# Patient Record
Sex: FEMALE | Race: WHITE | Employment: OTHER | URBAN - METROPOLITAN AREA
[De-identification: names, ages, dates, MRNs, and addresses within clinical notes are randomized per-mention and may not be internally consistent; named-entity substitution may affect disease eponyms.]

---

## 2017-01-18 RX ORDER — ZOLPIDEM TARTRATE 5 MG/1
TABLET ORAL
Qty: 30 TABLET | Refills: 0 | OUTPATIENT
Start: 2017-01-18 | End: 2017-12-11

## 2017-01-18 NOTE — TELEPHONE ENCOUNTER
Please advise on RF  · Appointment scheduled in the past 6 months or in the next 3 months  Recent Visits       Provider Department Primary Dx    4 months ago Marla Ruano, [address] [facility], [place] [address], [place] Routine general medical examination at a

## 2017-02-08 ENCOUNTER — OFFICE VISIT (OUTPATIENT)
Dept: OBGYN CLINIC | Facility: CLINIC | Age: 47
End: 2017-02-08

## 2017-02-08 VITALS — SYSTOLIC BLOOD PRESSURE: 122 MMHG | BODY MASS INDEX: 33 KG/M2 | WEIGHT: 193 LBS | DIASTOLIC BLOOD PRESSURE: 82 MMHG

## 2017-02-08 DIAGNOSIS — N89.8 VAGINAL DISCHARGE: ICD-10-CM

## 2017-02-08 DIAGNOSIS — Z78.9: Primary | ICD-10-CM

## 2017-02-08 PROCEDURE — 99212 OFFICE O/P EST SF 10 MIN: CPT | Performed by: OBSTETRICS & GYNECOLOGY

## 2017-02-08 NOTE — PROGRESS NOTES
HPI:    Patient ID: Yolette Centeno is a 55year old female. HPI  Patient presents with a complaint of experiencing excessive vaginal wetness and secretion only during sexual intercourse.   It begins as she is getting aroused during sexual activity an membranes  (primary encounter diagnosis)  Vaginal discharge  Excessive vaginal wetness or lubrication during sexual arousal  Check serum estradiol  Reassured that this is not pathology.     Orders Placed This Encounter  Estradiol    Meds This Visit:  No pre

## 2017-02-24 ENCOUNTER — APPOINTMENT (OUTPATIENT)
Dept: LAB | Age: 47
End: 2017-02-24
Attending: OBSTETRICS & GYNECOLOGY
Payer: COMMERCIAL

## 2017-02-24 DIAGNOSIS — Z78.9: ICD-10-CM

## 2017-02-24 LAB — ESTRADIOL SERPL-MCNC: 254 PG/ML

## 2017-02-24 PROCEDURE — 82670 ASSAY OF TOTAL ESTRADIOL: CPT

## 2017-02-24 PROCEDURE — 36415 COLL VENOUS BLD VENIPUNCTURE: CPT

## 2017-03-14 RX ORDER — LEVOTHYROXINE SODIUM 0.1 MG/1
TABLET ORAL
Qty: 90 TABLET | Refills: 0 | Status: SHIPPED | OUTPATIENT
Start: 2017-03-14 | End: 2017-05-11

## 2017-03-14 NOTE — TELEPHONE ENCOUNTER
Refilled per protocol  Hypothyroid Medications  Protocol Criteria:  Appointment scheduled in the past 12 months or the next 3 months  TSH resulted in the past 12 months that is normal  Recent Visits       Provider Department Primary Dx    6 months ago [address]

## 2017-05-13 RX ORDER — LEVOTHYROXINE SODIUM 0.1 MG/1
TABLET ORAL
Qty: 90 TABLET | Refills: 0 | Status: SHIPPED | OUTPATIENT
Start: 2017-05-13 | End: 2017-06-14

## 2017-05-13 RX ORDER — ERGOCALCIFEROL 1.25 MG/1
CAPSULE ORAL
Qty: 4 CAPSULE | Refills: 0 | Status: SHIPPED | OUTPATIENT
Start: 2017-05-13 | End: 2017-07-13

## 2017-05-13 NOTE — TELEPHONE ENCOUNTER
Hypothyroid Medications: Refilled per protocol    Protocol Criteria:  Appointment scheduled in the past 12 months or the next 3 months  TSH resulted in the past 12 months that is normal  Recent Visits       Provider Department Primary Dx    8 months ago Si

## 2017-06-14 ENCOUNTER — APPOINTMENT (OUTPATIENT)
Dept: LAB | Age: 47
End: 2017-06-14
Attending: FAMILY MEDICINE
Payer: COMMERCIAL

## 2017-06-14 ENCOUNTER — OFFICE VISIT (OUTPATIENT)
Dept: FAMILY MEDICINE CLINIC | Facility: CLINIC | Age: 47
End: 2017-06-14

## 2017-06-14 VITALS
DIASTOLIC BLOOD PRESSURE: 85 MMHG | HEART RATE: 73 BPM | HEIGHT: 64 IN | SYSTOLIC BLOOD PRESSURE: 121 MMHG | TEMPERATURE: 100 F | RESPIRATION RATE: 14 BRPM | BODY MASS INDEX: 31.1 KG/M2 | WEIGHT: 182.19 LBS

## 2017-06-14 DIAGNOSIS — E03.9 ACQUIRED HYPOTHYROIDISM: ICD-10-CM

## 2017-06-14 DIAGNOSIS — R06.83 SNORING: ICD-10-CM

## 2017-06-14 DIAGNOSIS — E55.9 VITAMIN D DEFICIENCY: ICD-10-CM

## 2017-06-14 DIAGNOSIS — L98.9 DISORDER OF SKIN OR SUBCUTANEOUS TISSUE: ICD-10-CM

## 2017-06-14 DIAGNOSIS — L85.9 HYPERKERATOSIS: ICD-10-CM

## 2017-06-14 DIAGNOSIS — L84 CALLUS OF FOOT: ICD-10-CM

## 2017-06-14 DIAGNOSIS — M79.672 LEFT FOOT PAIN: Primary | ICD-10-CM

## 2017-06-14 DIAGNOSIS — G47.8 UNREFRESHED BY SLEEP: ICD-10-CM

## 2017-06-14 DIAGNOSIS — R06.89 GASPING FOR BREATH: ICD-10-CM

## 2017-06-14 PROCEDURE — 99214 OFFICE O/P EST MOD 30 MIN: CPT | Performed by: FAMILY MEDICINE

## 2017-06-14 PROCEDURE — 11055 PARING/CUTG B9 HYPRKER LES 1: CPT | Performed by: FAMILY MEDICINE

## 2017-06-14 PROCEDURE — 82306 VITAMIN D 25 HYDROXY: CPT

## 2017-06-14 PROCEDURE — 36415 COLL VENOUS BLD VENIPUNCTURE: CPT

## 2017-06-14 PROCEDURE — 84443 ASSAY THYROID STIM HORMONE: CPT

## 2017-06-14 RX ORDER — LEVOTHYROXINE SODIUM 0.1 MG/1
100 TABLET ORAL DAILY
Qty: 3 TABLET | Refills: 0 | Status: SHIPPED | OUTPATIENT
Start: 2017-06-14 | End: 2017-06-17

## 2017-06-14 RX ORDER — LEVOTHYROXINE SODIUM 0.1 MG/1
TABLET ORAL
Qty: 90 TABLET | Refills: 2 | Status: SHIPPED | OUTPATIENT
Start: 2017-06-14 | End: 2017-12-11

## 2017-06-14 RX ORDER — UREA 40 %
CREAM (GRAM) TOPICAL
Qty: 85 G | Refills: 1 | Status: SHIPPED | OUTPATIENT
Start: 2017-06-14 | End: 2017-07-13

## 2017-06-14 NOTE — PROGRESS NOTES
Patient ID: Lidia Nielson is a 55year old female. HPI  Patient presents with:  Pain: bottom of left foot     She is here for a few issues.   The one that she had made the appointment for was the pain at the bottom of left foot underneath the fifth 30 tablet Rfl: 0   Tretinoin 0.05 % External Cream Apply to the entire face as directed at bedtime. Disp: 20 g Rfl: 3   Multiple Vitamin (MULTI-VITAMINS) Oral Tab Take  by mouth.  take 1 tablet by oral route  every day with food Disp:  Rfl:      Allergies:N deficiency and has been taking vitamin D and would like to get this checked along with her TSH  Snoring  -     HOME SLEEP APNEA TEST (VKS)  Home sleep apnea test ordered. Philo sleep score done.   Unrefreshed by sleep  -     HOME SLEEP APNEA TEST (VKS)

## 2017-06-20 ENCOUNTER — TELEPHONE (OUTPATIENT)
Dept: DERMATOLOGY CLINIC | Facility: CLINIC | Age: 47
End: 2017-06-20

## 2017-06-22 ENCOUNTER — OFFICE VISIT (OUTPATIENT)
Dept: SLEEP CENTER | Age: 47
End: 2017-06-22
Attending: FAMILY MEDICINE
Payer: COMMERCIAL

## 2017-06-22 DIAGNOSIS — R06.89 GASPING FOR BREATH: ICD-10-CM

## 2017-06-22 DIAGNOSIS — R06.83 SNORING: Primary | ICD-10-CM

## 2017-06-22 DIAGNOSIS — G47.8 UNREFRESHED BY SLEEP: ICD-10-CM

## 2017-06-22 PROCEDURE — 95806 SLEEP STUDY UNATT&RESP EFFT: CPT

## 2017-06-24 NOTE — PROCEDURES
[address] [facility]  Accredited by the [employer] [employer] (AASM)    PATIENT'S NAME: Korey Delarosa   ATTENDING PHYSICIAN: Kemar Damon DO   REFERRING PHYSICIAN: Kemar Damon DO   PATIENT ACCOUNT #: [de-identified] LOCATION: Sleep Avoid alcohol. 4.   Avoid sedating drug. 5.   The patient should not drive if at all sleepy. Please not hesitate to contact me if there is any question whatsoever regarding interpretation of this study.     Dictated By Nichole Blanco MD  d: 06/24/2

## 2017-06-27 ENCOUNTER — TELEPHONE (OUTPATIENT)
Dept: FAMILY MEDICINE CLINIC | Facility: CLINIC | Age: 47
End: 2017-06-27

## 2017-06-27 DIAGNOSIS — G47.33 OSA (OBSTRUCTIVE SLEEP APNEA): Primary | ICD-10-CM

## 2017-06-27 NOTE — TELEPHONE ENCOUNTER
Per Maxime Agudelo need a new Order for pt,  Need to put in Cpap Titration not a diagnostic sleep study. Pls put it in [facility] asap so that they can call pt for an appt.

## 2017-06-29 NOTE — TELEPHONE ENCOUNTER
Referral Notes   Number of Notes: 1   Type Date User Summary Attachment   Provider Comments 06/25/2017  6:02 PM Marcia Henry DO Provider Comments -   Note    Please call the [facility] at [phone] and ask for a CPAP titration.       Lance

## 2017-07-06 ENCOUNTER — TELEPHONE (OUTPATIENT)
Dept: FAMILY MEDICINE CLINIC | Facility: CLINIC | Age: 47
End: 2017-07-06

## 2017-07-06 DIAGNOSIS — G47.33 OSA (OBSTRUCTIVE SLEEP APNEA): Primary | ICD-10-CM

## 2017-07-06 NOTE — TELEPHONE ENCOUNTER
Annalee Green from [address] sleep study needs a new order and she needs to order to state cpap titration only so that insurance can be billed correctly . Mayur Belle please advise

## 2017-07-13 ENCOUNTER — LAB ENCOUNTER (OUTPATIENT)
Dept: LAB | Age: 47
End: 2017-07-13
Attending: FAMILY MEDICINE
Payer: COMMERCIAL

## 2017-07-13 ENCOUNTER — OFFICE VISIT (OUTPATIENT)
Dept: FAMILY MEDICINE CLINIC | Facility: CLINIC | Age: 47
End: 2017-07-13

## 2017-07-13 VITALS
HEIGHT: 64 IN | SYSTOLIC BLOOD PRESSURE: 127 MMHG | BODY MASS INDEX: 31.1 KG/M2 | DIASTOLIC BLOOD PRESSURE: 84 MMHG | HEART RATE: 98 BPM | TEMPERATURE: 99 F | RESPIRATION RATE: 16 BRPM | WEIGHT: 182.19 LBS

## 2017-07-13 DIAGNOSIS — Z11.3 SCREEN FOR STD (SEXUALLY TRANSMITTED DISEASE): ICD-10-CM

## 2017-07-13 DIAGNOSIS — R32 URINARY INCONTINENCE, UNSPECIFIED TYPE: ICD-10-CM

## 2017-07-13 DIAGNOSIS — M25.561 CHRONIC PAIN OF RIGHT KNEE: Primary | ICD-10-CM

## 2017-07-13 DIAGNOSIS — R39.11 URINARY HESITANCY: ICD-10-CM

## 2017-07-13 DIAGNOSIS — G89.29 CHRONIC PAIN OF RIGHT KNEE: Primary | ICD-10-CM

## 2017-07-13 PROCEDURE — 99214 OFFICE O/P EST MOD 30 MIN: CPT | Performed by: FAMILY MEDICINE

## 2017-07-13 PROCEDURE — 86704 HEP B CORE ANTIBODY TOTAL: CPT

## 2017-07-13 PROCEDURE — 99212 OFFICE O/P EST SF 10 MIN: CPT | Performed by: FAMILY MEDICINE

## 2017-07-13 PROCEDURE — 36415 COLL VENOUS BLD VENIPUNCTURE: CPT

## 2017-07-13 PROCEDURE — 87340 HEPATITIS B SURFACE AG IA: CPT

## 2017-07-13 PROCEDURE — 86696 HERPES SIMPLEX TYPE 2 TEST: CPT

## 2017-07-13 PROCEDURE — 87591 N.GONORRHOEAE DNA AMP PROB: CPT

## 2017-07-13 PROCEDURE — 86695 HERPES SIMPLEX TYPE 1 TEST: CPT

## 2017-07-13 PROCEDURE — 86694 HERPES SIMPLEX NES ANTBDY: CPT

## 2017-07-13 PROCEDURE — 87491 CHLMYD TRACH DNA AMP PROBE: CPT

## 2017-07-13 PROCEDURE — 87389 HIV-1 AG W/HIV-1&-2 AB AG IA: CPT

## 2017-07-13 PROCEDURE — 86706 HEP B SURFACE ANTIBODY: CPT

## 2017-07-13 PROCEDURE — 86780 TREPONEMA PALLIDUM: CPT

## 2017-07-13 PROCEDURE — 80500 HEPATITIS B PROFILE: CPT

## 2017-07-13 RX ORDER — ERGOCALCIFEROL 1.25 MG/1
CAPSULE ORAL
Qty: 4 CAPSULE | Refills: 0 | Status: SHIPPED | OUTPATIENT
Start: 2017-07-13 | End: 2018-03-04

## 2017-07-13 NOTE — PROGRESS NOTES
Patient ID: Markus Paredes is a 52year old female. HPI  Patient presents with:  Pain: right knee pain  Test Results: allergy test   She states she has had right knee pain for years.   No trauma but she states lately when she sits with her knee cros Tab TAKE ONE TABLET BY MOUTH ONCE DAILY Disp: 90 tablet Rfl: 2   ERGOCALCIFEROL 26917 units Oral Cap TAKE ONE CAPSULE BY MOUTH ONCE A WEEK Disp: 4 capsule Rfl: 0   ZOLPIDEM TARTRATE 5 MG Oral Tab TAKE ONE TABLET BY MOUTH AT BEDTIME FOR SLEEP Disp: 30 table your knee crossed as I will put more pressure in the medial joint line.   She states she is already been doing that and it has helped decrease the pain  Urinary hesitancy  -     PHYSICAL THERAPY - INTERNAL  Kegel exercises through physical therapy  Urinary

## 2017-07-14 LAB
C TRACH DNA SPEC QL NAA+PROBE: NEGATIVE
HBV CORE AB SERPL QL IA: NONREACTIVE
HBV SURFACE AB SER-ACNC: <3.1 MIU/ML (ref ?–10)
HBV SURFACE AG SERPL QL IA: NONREACTIVE
HBV SURFACE AG SERPL QL IA: NONREACTIVE
HIV1+2 AB SERPL QL IA: NONREACTIVE
HSV 1 GLYCOPROTEIN G AB, IGG: 33.1 IV
HSV 2 GLYCOPROTEIN G AB, IGG: 0.1 IV
N GONORRHOEA DNA SPEC QL NAA+PROBE: NEGATIVE
T PALLIDUM AB SER QL: NEGATIVE

## 2017-07-15 LAB
HSV TYPE 1/2 COMBINED AB, IGG: >22.4 IV
HSV TYPE 1/2 COMBINED ABS, IGM: 0.54 IV

## 2017-09-15 RX ORDER — LEVOTHYROXINE SODIUM 0.1 MG/1
TABLET ORAL
Qty: 90 TABLET | Refills: 0 | OUTPATIENT
Start: 2017-09-15

## 2017-12-08 ENCOUNTER — NURSE TRIAGE (OUTPATIENT)
Dept: FAMILY MEDICINE CLINIC | Facility: CLINIC | Age: 47
End: 2017-12-08

## 2017-12-08 NOTE — TELEPHONE ENCOUNTER
Action Requested: Summary for Provider     []  Critical Lab, Recommendations Needed  [x] Need Additional Advice  []   FYI    []   Need Orders  [] Need Medications Sent to Pharmacy  []  Other     SUMMARY: ADVICE NEEDED OV vs ER, intermittent dizziness spell

## 2017-12-08 NOTE — TELEPHONE ENCOUNTER
Pt informed of advise below from VS. Pt agrees and will try breathing technique. States currently napping. Agrees to go to ER if not improving or if worsening. Denies further questions/concerns at this time.

## 2017-12-08 NOTE — TELEPHONE ENCOUNTER
I think this sounds more stress related. If not really having any chest pain or trouble breathing I think he should be fine. He need to slow down her breathing and breathing through her nose and out the her mouth and try to do this 10 times.   If you star

## 2017-12-11 ENCOUNTER — OFFICE VISIT (OUTPATIENT)
Dept: FAMILY MEDICINE CLINIC | Facility: CLINIC | Age: 47
End: 2017-12-11

## 2017-12-11 VITALS
WEIGHT: 182 LBS | TEMPERATURE: 97 F | DIASTOLIC BLOOD PRESSURE: 84 MMHG | BODY MASS INDEX: 31.07 KG/M2 | HEIGHT: 64 IN | SYSTOLIC BLOOD PRESSURE: 120 MMHG | HEART RATE: 80 BPM

## 2017-12-11 DIAGNOSIS — Z23 NEED FOR VACCINATION: ICD-10-CM

## 2017-12-11 DIAGNOSIS — E03.9 ACQUIRED HYPOTHYROIDISM: ICD-10-CM

## 2017-12-11 DIAGNOSIS — H69.83 DYSFUNCTION OF BOTH EUSTACHIAN TUBES: ICD-10-CM

## 2017-12-11 DIAGNOSIS — G47.00 INSOMNIA, UNSPECIFIED TYPE: ICD-10-CM

## 2017-12-11 DIAGNOSIS — R42 DIZZINESS: Primary | ICD-10-CM

## 2017-12-11 DIAGNOSIS — G47.33 OBSTRUCTIVE SLEEP APNEA SYNDROME: ICD-10-CM

## 2017-12-11 PROCEDURE — 90686 IIV4 VACC NO PRSV 0.5 ML IM: CPT | Performed by: FAMILY MEDICINE

## 2017-12-11 PROCEDURE — 99212 OFFICE O/P EST SF 10 MIN: CPT | Performed by: FAMILY MEDICINE

## 2017-12-11 PROCEDURE — 99214 OFFICE O/P EST MOD 30 MIN: CPT | Performed by: FAMILY MEDICINE

## 2017-12-11 PROCEDURE — 90471 IMMUNIZATION ADMIN: CPT | Performed by: FAMILY MEDICINE

## 2017-12-11 RX ORDER — ZOLPIDEM TARTRATE 5 MG/1
TABLET ORAL
Qty: 30 TABLET | Refills: 0 | Status: SHIPPED | OUTPATIENT
Start: 2017-12-11 | End: 2018-07-31

## 2017-12-11 RX ORDER — LEVOTHYROXINE SODIUM 0.1 MG/1
TABLET ORAL
Qty: 90 TABLET | Refills: 0 | Status: SHIPPED | OUTPATIENT
Start: 2017-12-11 | End: 2018-03-04

## 2017-12-11 NOTE — PROGRESS NOTES
Patient ID: Elizabeth Jensen is a 52year old female.     HPI  Patient presents with:  Dizziness  Medication Request    Action Requested: Summary for Provider     []  Critical Lab, Recommendations Needed  [x] Need Additional Advice  []   FYI    []   Need sleep as well. She states she needs some zolpidem for times when she is a bit stressed and unable to sleep. For this year to get all her labs done.   She also states she needs her thyroid test done and then she will make an appointment for a full physical Rfl:      Allergies:No Known Allergies   PHYSICAL EXAM:   Physical Exam  Blood pressure 126/93, pulse 80, temperature (!) 97.4 °F (36.3 °C), temperature source Oral, height 5' 4\" (1.626 m), weight 182 lb (82.6 kg), not currently breastfeeding.     Physical TABLET BY MOUTH ONCE DAILY  She will see me for a physical in the near future  Insomnia, unspecified type  -     Zolpidem Tartrate 5 MG Oral Tab; TAKE ONE TABLET BY MOUTH AT BEDTIME FOR SLEEP  I wrote for some zolpidem that she only takes as needed  Need f

## 2017-12-15 ENCOUNTER — OFFICE VISIT (OUTPATIENT)
Dept: FAMILY MEDICINE CLINIC | Facility: CLINIC | Age: 47
End: 2017-12-15

## 2017-12-15 VITALS
SYSTOLIC BLOOD PRESSURE: 127 MMHG | WEIGHT: 182 LBS | DIASTOLIC BLOOD PRESSURE: 91 MMHG | TEMPERATURE: 98 F | BODY MASS INDEX: 31.07 KG/M2 | HEART RATE: 72 BPM | HEIGHT: 64 IN

## 2017-12-15 DIAGNOSIS — R79.9 ELEVATED RESULT IN MULTI-BIOMARKER DISEASE ACTIVITY PANEL FOR RHEUMATOID ARTHRITIS: ICD-10-CM

## 2017-12-15 DIAGNOSIS — Z12.31 VISIT FOR SCREENING MAMMOGRAM: ICD-10-CM

## 2017-12-15 DIAGNOSIS — Z00.00 ADULT GENERAL MEDICAL EXAM: Primary | ICD-10-CM

## 2017-12-15 DIAGNOSIS — E55.9 VITAMIN D DEFICIENCY: ICD-10-CM

## 2017-12-15 DIAGNOSIS — E03.9 ACQUIRED HYPOTHYROIDISM: ICD-10-CM

## 2017-12-15 DIAGNOSIS — H91.93 HEARING DECREASED, BILATERAL: ICD-10-CM

## 2017-12-15 DIAGNOSIS — Z12.4 CERVICAL CANCER SCREENING: ICD-10-CM

## 2017-12-15 PROCEDURE — 99396 PREV VISIT EST AGE 40-64: CPT | Performed by: FAMILY MEDICINE

## 2017-12-15 PROCEDURE — G0439 PPPS, SUBSEQ VISIT: HCPCS | Performed by: FAMILY MEDICINE

## 2017-12-15 NOTE — PROGRESS NOTES
Patient ID: Sarita Burt is a 52year old female. HPI  Patient presents with:  Physical    She does not smoke, she works in real estate and sells houses. She has had a boyfriend for 6 weeks. She hired assistant she states she is very busy.     Contreras Thompson 12/08/2016   CO2 29 12/08/2016   OSMOCALC 287 12/08/2016       No results found for: Miguel Maizes, [address], [address], [address], [address], [address], [address] [facility], [address] [facility], [facility] 264, [address], [address] [place], [address] [facility], [address], [facility], [address] [address], [address], [facility], [address] [facility] Gastrointestinal: Negative for abdominal pain, blood in stool and vomiting. Endocrine: Negative for cold intolerance and heat intolerance. Genitourinary: Negative for hematuria. Musculoskeletal: Negative for joint swelling.    Skin: Negative for tristen directed at bedtime. Disp: 20 g Rfl: 0   Multiple Vitamin (MULTI-VITAMINS) Oral Tab Take  by mouth.  take 1 tablet by oral route  every day with food Disp:  Rfl:      Allergies:No Known Allergies   PHYSICAL EXAM:   Physical Exam  Physical Exam   Constitutio (NZN=[id number]);  Future    Cervical cancer screening  -     OBG - INTERNAL  See Dr. Sanjeev Mathews D deficiency  -     VITAMIN D, 25-HYDROXY; Future  She has had vitamin D deficiency in the past.  I told her to take an over-the-counter multivitamin vitamin D in

## 2018-01-08 ENCOUNTER — OFFICE VISIT (OUTPATIENT)
Dept: SLEEP CENTER | Age: 48
End: 2018-01-08
Attending: FAMILY MEDICINE
Payer: COMMERCIAL

## 2018-01-08 DIAGNOSIS — Z76.89 SLEEP CONCERN: Primary | ICD-10-CM

## 2018-01-08 PROCEDURE — 95811 POLYSOM 6/>YRS CPAP 4/> PARM: CPT

## 2018-01-11 NOTE — PROCEDURES
[address] [facility]  Accredited by the [employer] of Sleep Medicine (AASM)    PATIENT'S NAME: Missael Webster   ATTENDING PHYSICIAN: Anna Balderas DO   REFERRING PHYSICIAN: Anna Balderas DO   PATIENT ACCOUNT #: [de-identified] LOCATION: Sleep respiratory event related arousal index was 3.2 events per hour and the spontaneous arousal index was 27.4 events per hour for a combined arousal index of 30.6 events per hour.   There were 18 periodic limb movements for a periodic limb movement index of 7

## 2018-02-12 ENCOUNTER — TELEPHONE (OUTPATIENT)
Dept: FAMILY MEDICINE CLINIC | Facility: CLINIC | Age: 48
End: 2018-02-12

## 2018-02-12 NOTE — TELEPHONE ENCOUNTER
Deb from [place] states she only received the referral for the CPAP and nothing else.     Faxed face sheet, LOV notes, sleep study report and CPAP order to [phone]

## 2018-02-27 ENCOUNTER — LAB ENCOUNTER (OUTPATIENT)
Dept: LAB | Age: 48
End: 2018-02-27
Attending: FAMILY MEDICINE
Payer: COMMERCIAL

## 2018-02-27 DIAGNOSIS — E55.9 VITAMIN D DEFICIENCY: ICD-10-CM

## 2018-02-27 DIAGNOSIS — R79.9 ELEVATED RESULT IN MULTI-BIOMARKER DISEASE ACTIVITY PANEL FOR RHEUMATOID ARTHRITIS: ICD-10-CM

## 2018-02-27 DIAGNOSIS — Z00.00 ADULT GENERAL MEDICAL EXAM: ICD-10-CM

## 2018-02-27 LAB
ALBUMIN SERPL BCP-MCNC: 3.6 G/DL (ref 3.5–4.8)
ALBUMIN/GLOB SERPL: 1.2 {RATIO} (ref 1–2)
ALP SERPL-CCNC: 31 U/L (ref 32–100)
ALT SERPL-CCNC: 15 U/L (ref 14–54)
ANION GAP SERPL CALC-SCNC: 6 MMOL/L (ref 0–18)
AST SERPL-CCNC: 20 U/L (ref 15–41)
BASOPHILS # BLD: 0 K/UL (ref 0–0.2)
BASOPHILS NFR BLD: 1 %
BILIRUB SERPL-MCNC: 0.4 MG/DL (ref 0.3–1.2)
BUN SERPL-MCNC: 9 MG/DL (ref 8–20)
BUN/CREAT SERPL: 11.1 (ref 10–20)
CALCIUM SERPL-MCNC: 8.5 MG/DL (ref 8.5–10.5)
CHLORIDE SERPL-SCNC: 105 MMOL/L (ref 95–110)
CHOLEST SERPL-MCNC: 175 MG/DL (ref 110–200)
CO2 SERPL-SCNC: 25 MMOL/L (ref 22–32)
CREAT SERPL-MCNC: 0.81 MG/DL (ref 0.5–1.5)
EOSINOPHIL # BLD: 0.1 K/UL (ref 0–0.7)
EOSINOPHIL NFR BLD: 3 %
ERYTHROCYTE [DISTWIDTH] IN BLOOD BY AUTOMATED COUNT: 13.2 % (ref 11–15)
GLOBULIN PLAS-MCNC: 3 G/DL (ref 2.5–3.7)
GLUCOSE SERPL-MCNC: 92 MG/DL (ref 70–99)
HCT VFR BLD AUTO: 37.8 % (ref 35–48)
HDLC SERPL-MCNC: 56 MG/DL
HGB BLD-MCNC: 12.7 G/DL (ref 12–16)
LDLC SERPL CALC-MCNC: 103 MG/DL (ref 0–99)
LYMPHOCYTES # BLD: 1.3 K/UL (ref 1–4)
LYMPHOCYTES NFR BLD: 32 %
MCH RBC QN AUTO: 31.9 PG (ref 27–32)
MCHC RBC AUTO-ENTMCNC: 33.6 G/DL (ref 32–37)
MCV RBC AUTO: 95.1 FL (ref 80–100)
MONOCYTES # BLD: 0.3 K/UL (ref 0–1)
MONOCYTES NFR BLD: 7 %
NEUTROPHILS # BLD AUTO: 2.4 K/UL (ref 1.8–7.7)
NEUTROPHILS NFR BLD: 57 %
NONHDLC SERPL-MCNC: 119 MG/DL
OSMOLALITY UR CALC.SUM OF ELEC: 280 MOSM/KG (ref 275–295)
PATIENT FASTING: YES
PLATELET # BLD AUTO: 214 K/UL (ref 140–400)
PMV BLD AUTO: 9.4 FL (ref 7.4–10.3)
POTASSIUM SERPL-SCNC: 3.9 MMOL/L (ref 3.3–5.1)
PROT SERPL-MCNC: 6.6 G/DL (ref 5.9–8.4)
RBC # BLD AUTO: 3.98 M/UL (ref 3.7–5.4)
RHEUMATOID FACT SER QL: 103 IU/ML
SODIUM SERPL-SCNC: 136 MMOL/L (ref 136–144)
TRIGL SERPL-MCNC: 81 MG/DL (ref 1–149)
TSH SERPL-ACNC: 3.64 UIU/ML (ref 0.45–5.33)
WBC # BLD AUTO: 4.2 K/UL (ref 4–11)

## 2018-02-27 PROCEDURE — 86431 RHEUMATOID FACTOR QUANT: CPT

## 2018-02-27 PROCEDURE — 80053 COMPREHEN METABOLIC PANEL: CPT

## 2018-02-27 PROCEDURE — 80061 LIPID PANEL: CPT

## 2018-02-27 PROCEDURE — 82306 VITAMIN D 25 HYDROXY: CPT

## 2018-02-27 PROCEDURE — 84443 ASSAY THYROID STIM HORMONE: CPT

## 2018-02-27 PROCEDURE — 85025 COMPLETE CBC W/AUTO DIFF WBC: CPT

## 2018-02-27 PROCEDURE — 36415 COLL VENOUS BLD VENIPUNCTURE: CPT

## 2018-02-28 LAB — 25(OH)D3 SERPL-MCNC: 16.9 NG/ML

## 2018-03-06 NOTE — PROGRESS NOTES
Pt informed of lab result & MD recommendation, pt stated understanding. meds faxed to [facility] by Dr Ella Flores. No future appointments.

## 2018-03-20 ENCOUNTER — HOSPITAL ENCOUNTER (OUTPATIENT)
Dept: GENERAL RADIOLOGY | Age: 48
Discharge: HOME | End: 2018-03-20
Attending: FAMILY MEDICINE
Payer: COMMERCIAL

## 2018-03-20 ENCOUNTER — OFFICE VISIT (OUTPATIENT)
Dept: FAMILY MEDICINE CLINIC | Facility: CLINIC | Age: 48
End: 2018-03-20

## 2018-03-20 VITALS — TEMPERATURE: 99 F | HEART RATE: 96 BPM | DIASTOLIC BLOOD PRESSURE: 87 MMHG | SYSTOLIC BLOOD PRESSURE: 122 MMHG

## 2018-03-20 DIAGNOSIS — M25.50 POLYARTHRALGIA: Primary | ICD-10-CM

## 2018-03-20 DIAGNOSIS — M05.80 POLYARTHRITIS WITH POSITIVE RHEUMATOID FACTOR (HCC): ICD-10-CM

## 2018-03-20 DIAGNOSIS — M25.532 LEFT WRIST PAIN: ICD-10-CM

## 2018-03-20 DIAGNOSIS — M25.512 ACUTE PAIN OF LEFT SHOULDER: ICD-10-CM

## 2018-03-20 DIAGNOSIS — R52 MIGRATORY PAIN: ICD-10-CM

## 2018-03-20 PROCEDURE — 73130 X-RAY EXAM OF HAND: CPT | Performed by: FAMILY MEDICINE

## 2018-03-20 PROCEDURE — 99214 OFFICE O/P EST MOD 30 MIN: CPT | Performed by: FAMILY MEDICINE

## 2018-03-20 PROCEDURE — 73030 X-RAY EXAM OF SHOULDER: CPT | Performed by: FAMILY MEDICINE

## 2018-03-20 PROCEDURE — 99212 OFFICE O/P EST SF 10 MIN: CPT | Performed by: FAMILY MEDICINE

## 2018-03-20 RX ORDER — MELOXICAM 15 MG/1
15 TABLET ORAL DAILY
Qty: 30 TABLET | Refills: 1 | Status: SHIPPED | OUTPATIENT
Start: 2018-03-20 | End: 2021-09-21

## 2018-03-20 NOTE — PROGRESS NOTES
Patient ID: Elizabeth Jensen is a 52year old female. HPI  Patient presents with:  Wrist Pain      She states she has had bilateral wrist pain. First started in the right wrist and she is right-hand dominant and this was sometime in December 2017. MG Oral Tab TAKE ONE TABLET BY MOUTH AT BEDTIME FOR SLEEP Disp: 30 tablet Rfl: 0   Tretinoin 0.05 % External Cream Apply to the entire face as directed at bedtime. Disp: 20 g Rfl: 0   Multiple Vitamin (MULTI-VITAMINS) Oral Tab Take  by mouth.  take 1 tablet without swelling or decreased range of motion. ASSESSMENT/PLAN:     Diagnoses and all orders for this visit:    Polyarthralgia  -     CARIN, DIRECT SCREEN; Future  -     RHEUMATOID ARTHRITIS FACTOR; Future  -     SED ROWAN CARRERA (AUTOMATED);  Harjit (pain/inflammation). Referrals (if applicable)    Orders Placed This Encounter      Rheumatology - Dr See Salas          Order Comments:              She had a positive rheumatoid arthritis test in February of this year.   She has been having Arlet

## 2018-03-30 ENCOUNTER — HOSPITAL ENCOUNTER (OUTPATIENT)
Dept: MAMMOGRAPHY | Age: 48
Discharge: HOME | End: 2018-03-30
Attending: FAMILY MEDICINE
Payer: COMMERCIAL

## 2018-03-30 DIAGNOSIS — Z12.31 VISIT FOR SCREENING MAMMOGRAM: ICD-10-CM

## 2018-03-30 PROCEDURE — 77067 SCR MAMMO BI INCL CAD: CPT | Performed by: FAMILY MEDICINE

## 2018-04-09 ENCOUNTER — OFFICE VISIT (OUTPATIENT)
Dept: AUDIOLOGY | Facility: CLINIC | Age: 48
End: 2018-04-09

## 2018-04-09 DIAGNOSIS — H93.293 ABNORMAL AUDITORY PERCEPTION, BILATERAL: Primary | ICD-10-CM

## 2018-04-09 PROCEDURE — 92567 TYMPANOMETRY: CPT | Performed by: AUDIOLOGIST

## 2018-04-09 PROCEDURE — 92557 COMPREHENSIVE HEARING TEST: CPT | Performed by: AUDIOLOGIST

## 2018-04-10 NOTE — PROGRESS NOTES
AUDIOGRAM     Estella Lopez was referred for testing by Lolly Thibodeaux for trouble hearing in both ears. 7/1/1970  [id number]      History    Ms. Scar Riley is here today for an audiological evaluation.    She reports that he has had trouble hearing Kyleigh Dias

## 2018-04-23 ENCOUNTER — OFFICE VISIT (OUTPATIENT)
Dept: OBGYN CLINIC | Facility: CLINIC | Age: 48
End: 2018-04-23

## 2018-04-23 VITALS
HEIGHT: 64 IN | WEIGHT: 196 LBS | BODY MASS INDEX: 33.46 KG/M2 | SYSTOLIC BLOOD PRESSURE: 122 MMHG | DIASTOLIC BLOOD PRESSURE: 84 MMHG | HEART RATE: 67 BPM

## 2018-04-23 DIAGNOSIS — Z01.419 ENCOUNTER FOR GYNECOLOGICAL EXAMINATION WITHOUT ABNORMAL FINDING: Primary | ICD-10-CM

## 2018-04-23 DIAGNOSIS — N92.6 IRREGULAR MENSES: ICD-10-CM

## 2018-04-23 DIAGNOSIS — N89.8 LEUKORRHEA: ICD-10-CM

## 2018-04-23 PROCEDURE — 99213 OFFICE O/P EST LOW 20 MIN: CPT | Performed by: OBSTETRICS & GYNECOLOGY

## 2018-04-23 PROCEDURE — 99396 PREV VISIT EST AGE 40-64: CPT | Performed by: OBSTETRICS & GYNECOLOGY

## 2018-04-23 RX ORDER — MELOXICAM 15 MG/1
TABLET ORAL
Refills: 1 | COMMUNITY
Start: 2018-03-20 | End: 2018-07-31

## 2018-04-26 NOTE — PROGRESS NOTES
Lidia Nielson is a 52year old female [id number] Patient's last menstrual period was 04/19/2018. Patient presents with:  Gyn Exam: ANNUAL EXAM -- new pt -- prefers female gyne. [employer]. Menstrual Problem: know fibroids.  In feb - march, franklyn castellanos activity: Yes    Partners: Male     Other Topics Concern    Caffeine Concern Yes    Comment: coffee 2 cups    Pt has a pacemaker No    Pt has a defibrillator No    Reaction to local anesthetic No     Social History Narrative   None on file       FAMILY HIS 196 lb (88.9 kg)   LMP 04/19/2018   BMI 33.64 kg/m²   Constitutional:  well developed, well nourished  Head/Face:  normocephalic  Neck/Thyroid: thyroid symmetric, no thyromegaly, no nodules, no adenopathy  Lymphatic: no abnormal supraclavicular or axillary

## 2018-05-19 NOTE — TELEPHONE ENCOUNTER
Dr [facility] to advise on pended ergocalciferol 50,000 units.       Refill Protocol Appointment Criteria  · Appointment scheduled in the past 6 months or in the next 3 months  Recent Outpatient Visits            3 weeks ago Encounter for gynecological examinat

## 2018-05-21 ENCOUNTER — TELEPHONE (OUTPATIENT)
Dept: OBGYN CLINIC | Facility: CLINIC | Age: 48
End: 2018-05-21

## 2018-05-21 RX ORDER — ERGOCALCIFEROL 1.25 MG/1
CAPSULE ORAL
Qty: 12 CAPSULE | Refills: 0 | Status: SHIPPED | OUTPATIENT
Start: 2018-05-21 | End: 2020-03-05

## 2018-05-21 NOTE — TELEPHONE ENCOUNTER
Pt states that she was instructed to make appt asap for uterus scarping, was given instructions on what pain medication to take before hand by NJG.  appt set for tomorrow with NJG at 10:20am.

## 2018-05-22 ENCOUNTER — OFFICE VISIT (OUTPATIENT)
Dept: OBGYN CLINIC | Facility: CLINIC | Age: 48
End: 2018-05-22

## 2018-05-22 VITALS
HEART RATE: 77 BPM | SYSTOLIC BLOOD PRESSURE: 104 MMHG | WEIGHT: 195 LBS | BODY MASS INDEX: 33 KG/M2 | DIASTOLIC BLOOD PRESSURE: 74 MMHG

## 2018-05-22 DIAGNOSIS — N92.6 IRREGULAR MENSTRUAL CYCLE: ICD-10-CM

## 2018-05-22 DIAGNOSIS — N92.6 IRREGULAR MENSES: Primary | ICD-10-CM

## 2018-05-22 PROCEDURE — 58100 BIOPSY OF UTERUS LINING: CPT | Performed by: OBSTETRICS & GYNECOLOGY

## 2018-06-06 ENCOUNTER — TELEPHONE (OUTPATIENT)
Dept: OBGYN CLINIC | Facility: CLINIC | Age: 48
End: 2018-06-06

## 2018-06-06 NOTE — TELEPHONE ENCOUNTER
----- Message from Julius Obregon MD sent at 5/28/2018  1:28 PM CDT -----  Please call pt and inform her of results attached -- neg embx

## 2018-06-07 DIAGNOSIS — E03.9 ACQUIRED HYPOTHYROIDISM: ICD-10-CM

## 2018-06-09 RX ORDER — LEVOTHYROXINE SODIUM 112 UG/1
TABLET ORAL
Qty: 90 TABLET | Refills: 0 | Status: SHIPPED | OUTPATIENT
Start: 2018-06-09 | End: 2018-12-18

## 2018-06-09 NOTE — TELEPHONE ENCOUNTER
Refill passed per [facility], [facility] protocol.   Hypothyroid Medications  Protocol Criteria:  Appointment scheduled in the past 12 months or the next 3 months  TSH resulted in the past 12 months that is normal  Recent Outpatient Visits            2 weeks ago I

## 2018-07-24 ENCOUNTER — TELEPHONE (OUTPATIENT)
Dept: OBGYN CLINIC | Facility: CLINIC | Age: 48
End: 2018-07-24

## 2018-07-24 DIAGNOSIS — R30.0 BURNING WITH URINATION: Primary | ICD-10-CM

## 2018-07-24 NOTE — TELEPHONE ENCOUNTER
C/O of \"morning urine like smell for 6 days\" Thinks its gotten worse in the last 3 days to where now she smells this \"foul\" smell \"all the time\". States there is a \"little discomfort of \"burning\" with urination.   States has been having frequent u

## 2018-07-25 ENCOUNTER — LAB ENCOUNTER (OUTPATIENT)
Dept: LAB | Facility: HOSPITAL | Age: 48
End: 2018-07-25
Attending: FAMILY MEDICINE
Payer: COMMERCIAL

## 2018-07-25 DIAGNOSIS — M05.80 POLYARTHRITIS WITH POSITIVE RHEUMATOID FACTOR (HCC): ICD-10-CM

## 2018-07-25 DIAGNOSIS — R52 MIGRATORY PAIN: ICD-10-CM

## 2018-07-25 DIAGNOSIS — M25.50 POLYARTHRALGIA: ICD-10-CM

## 2018-07-25 DIAGNOSIS — M25.512 ACUTE PAIN OF LEFT SHOULDER: ICD-10-CM

## 2018-07-25 DIAGNOSIS — R30.0 BURNING WITH URINATION: ICD-10-CM

## 2018-07-25 DIAGNOSIS — M25.532 LEFT WRIST PAIN: ICD-10-CM

## 2018-07-25 LAB
BILIRUB UR QL: NEGATIVE
CLARITY UR: CLEAR
COLOR UR: COLORLESS
CRP SERPL-MCNC: <0.5 MG/DL (ref 0–0.9)
ERYTHROCYTE [SEDIMENTATION RATE] IN BLOOD: 13 MM/HR (ref 0–20)
GLUCOSE UR-MCNC: NEGATIVE MG/DL
HGB UR QL STRIP.AUTO: NEGATIVE
KETONES UR-MCNC: NEGATIVE MG/DL
LEUKOCYTE ESTERASE UR QL STRIP.AUTO: NEGATIVE
NITRITE UR QL STRIP.AUTO: NEGATIVE
PH UR: 7 [PH] (ref 5–8)
PROT UR-MCNC: NEGATIVE MG/DL
RHEUMATOID FACT SER QL: 103 IU/ML
SP GR UR STRIP: 1 (ref 1–1.03)
UROBILINOGEN UR STRIP-ACNC: <2
VIT C UR-MCNC: NEGATIVE MG/DL

## 2018-07-25 PROCEDURE — 86431 RHEUMATOID FACTOR QUANT: CPT

## 2018-07-25 PROCEDURE — 36415 COLL VENOUS BLD VENIPUNCTURE: CPT

## 2018-07-25 PROCEDURE — 86140 C-REACTIVE PROTEIN: CPT

## 2018-07-25 PROCEDURE — 86039 ANTINUCLEAR ANTIBODIES (ANA): CPT

## 2018-07-25 PROCEDURE — 81001 URINALYSIS AUTO W/SCOPE: CPT

## 2018-07-25 PROCEDURE — 86038 ANTINUCLEAR ANTIBODIES: CPT

## 2018-07-25 PROCEDURE — 85652 RBC SED RATE AUTOMATED: CPT

## 2018-07-25 NOTE — TELEPHONE ENCOUNTER
Reviewed UA with NJG and it is normal. Informed NJG that pt has the foul smelling urine, frequent urination at night and a little burning. Informed NJG that pt is taking vitamins and herbs. NJG stated that pt should stop her vitamins and herbs.   Wait

## 2018-07-26 LAB — NUCLEAR IGG TITR SER IF: POSITIVE {TITER}

## 2018-07-27 LAB — ANA NUCLEOLAR TITR SER IF: 160 {TITER}

## 2018-07-28 ENCOUNTER — TELEPHONE (OUTPATIENT)
Dept: FAMILY MEDICINE CLINIC | Facility: CLINIC | Age: 48
End: 2018-07-28

## 2018-07-28 NOTE — TELEPHONE ENCOUNTER
Phone Room- Please call and schedule an appointment for patient with Dr. Manuelito Jackson. See message below.

## 2018-07-28 NOTE — TELEPHONE ENCOUNTER
----- Message from Markie Smith DO sent at 7/27/2018  5:24 PM CDT -----  Has she made an appointment yet to see me. If not please make her one.   We need to discuss her labs and do her exam.

## 2018-07-31 ENCOUNTER — APPOINTMENT (OUTPATIENT)
Dept: LAB | Age: 48
End: 2018-07-31
Attending: FAMILY MEDICINE
Payer: COMMERCIAL

## 2018-07-31 ENCOUNTER — OFFICE VISIT (OUTPATIENT)
Dept: FAMILY MEDICINE CLINIC | Facility: CLINIC | Age: 48
End: 2018-07-31

## 2018-07-31 VITALS
HEIGHT: 64 IN | HEART RATE: 70 BPM | TEMPERATURE: 98 F | DIASTOLIC BLOOD PRESSURE: 87 MMHG | WEIGHT: 204 LBS | SYSTOLIC BLOOD PRESSURE: 122 MMHG | BODY MASS INDEX: 34.83 KG/M2

## 2018-07-31 DIAGNOSIS — E03.9 ACQUIRED HYPOTHYROIDISM: ICD-10-CM

## 2018-07-31 DIAGNOSIS — Z82.69 FAMILY HISTORY OF FIBROMYALGIA: ICD-10-CM

## 2018-07-31 DIAGNOSIS — R76.8 ANTI-TPO ANTIBODIES PRESENT: ICD-10-CM

## 2018-07-31 DIAGNOSIS — M79.10 MYALGIA: ICD-10-CM

## 2018-07-31 DIAGNOSIS — R76.8 POSITIVE ANA (ANTINUCLEAR ANTIBODY): Primary | ICD-10-CM

## 2018-07-31 DIAGNOSIS — R76.8 POSITIVE ANA (ANTINUCLEAR ANTIBODY): ICD-10-CM

## 2018-07-31 LAB
C3 SERPL-MCNC: 122 MG/DL (ref 88–201)
C4 SERPL-MCNC: 20 MG/DL (ref 18–55)
TSH SERPL-ACNC: 1.83 UIU/ML (ref 0.45–5.33)

## 2018-07-31 PROCEDURE — 36415 COLL VENOUS BLD VENIPUNCTURE: CPT

## 2018-07-31 PROCEDURE — 99212 OFFICE O/P EST SF 10 MIN: CPT | Performed by: FAMILY MEDICINE

## 2018-07-31 PROCEDURE — 84443 ASSAY THYROID STIM HORMONE: CPT

## 2018-07-31 PROCEDURE — 99214 OFFICE O/P EST MOD 30 MIN: CPT | Performed by: FAMILY MEDICINE

## 2018-07-31 PROCEDURE — 86160 COMPLEMENT ANTIGEN: CPT

## 2018-07-31 PROCEDURE — 86200 CCP ANTIBODY: CPT

## 2018-07-31 PROCEDURE — 86376 MICROSOMAL ANTIBODY EACH: CPT

## 2018-07-31 PROCEDURE — 86225 DNA ANTIBODY NATIVE: CPT

## 2018-07-31 PROCEDURE — 86235 NUCLEAR ANTIGEN ANTIBODY: CPT

## 2018-07-31 PROCEDURE — 84165 PROTEIN E-PHORESIS SERUM: CPT

## 2018-07-31 RX ORDER — CYCLOBENZAPRINE HCL 10 MG
10 TABLET ORAL NIGHTLY
Qty: 30 TABLET | Refills: 1 | Status: SHIPPED | OUTPATIENT
Start: 2018-07-31 | End: 2018-08-20

## 2018-07-31 NOTE — PROGRESS NOTES
Patient ID: Han Aguirre is a 50year old female. HPI  Patient presents with:  Test Results    Her rheumatoid arthritis test came back positive again but the antinuclear antibody is higher than before.   She did see the rheumatologist she thinks i Absolute      1.0 - 4.0 K/UL    Monocytes Absolute      0.0 - 1.0 K/UL    Eosinophils Absolute      0.0 - 0.7 K/UL    Basophils Absolute      0.0 - 0.2 K/UL    URINE-COLOR      Yellow Colorless (A)   CLARITY URINE      Clear Clear   SPECIFIC GRAVITY      1 mmol/L    BUN/CREA RATIO      10.0 - 20.0    CALCULATED OSMOLALITY      275 - 295 mOsm/kg    GFR, Non-      >=60    GFR, -American      >=60    Patient Fasting?           WBC      4.0 - 11.0 K/UL    RBC      3.70 - 5.40 M/UL    Hemogl TSH      0.45 - 5.33 uIU/mL    Vitamin D, 25OH, Total      ng/mL    RHEUMATOID FACTOR      <11 IU/mL    CARIN SCREEN      Negative    SED RATE      0 - 20 mm/Hr    C-REACTIVE PROTEIN      0.0 - 0.9 mg/dL      Component      Latest Ref Rng & Units 2/27/ Negative mg/dL    GLUCOSE (URINE DIPSTICK)      Negative mg/dL    KETONES (URINE DIPSTICK)      Negative mg/dL    BILIRUBIN      Negative    OCCULT BLOOD      Negative    NITRITE, URINE      Negative    UROBILINOGEN,SEMI-QN      <2.0    LEUKOCYTES      Ne AGRATIO 1.2 12/09/2014    02/27/2018   K 3.9 02/27/2018    02/27/2018   CO2 25 02/27/2018         Lab Results  Component Value Date   GLU 92 02/27/2018   BUN 9 02/27/2018   CREATSERUM 0.81 02/27/2018   BUNCREA 11.1 02/27/2018   ANIONGAP 6 02/ oz (82.6 kg)      Body mass index is 35.02 kg/m².     BP Readings from Last 6 Encounters:  07/31/18 : 122/87  05/22/18 : 104/74  04/23/18 : 122/84  03/20/18 : 122/87  12/15/17 : 127/91  12/11/17 : 120/84        Review of Systems      Past Medical History: present. Cardiovascular: Normal rate, regular rhythm and normal heart sounds. Pulmonary/Chest: Effort normal and breath sounds normal. No respiratory distress. Lymphadenopathy:     Patient has  no cervical adenopathy.    Neurological: Patient is aler Requested Specialty: RHEUMATOLOGY          Number of Visits Requested: 3      Follow up if symptoms persist.  Take medicine (if given) as prescribed. Approach to treatment discussed and patient/family member understands and agrees to plan.      No Fo

## 2018-08-01 LAB
CCP IGG SERPL-ACNC: 7.7 U/ML (ref 0–6.9)
THYROPEROXIDASE AB SERPL-ACNC: 684.3 IU/ML (ref 0–9)

## 2018-08-03 LAB
ALBUMIN SERPL ELPH-MCNC: 3.96 G/DL (ref 3.75–5.21)
ALBUMIN/GLOB SERPL: 1.3 {RATIO} (ref 1–2)
ALPHA1 GLOB SERPL ELPH-MCNC: 0.25 G/DL (ref 0.19–0.46)
ALPHA2 GLOB SERPL ELPH-MCNC: 0.6 G/DL (ref 0.48–1.05)
B-GLOBULIN SERPL ELPH-MCNC: 0.83 G/DL (ref 0.68–1.23)
DSDNA AB TITR SER: <10 {TITER}
GAMMA GLOB SERPL ELPH-MCNC: 1.36 G/DL (ref 0.62–1.7)
TOTAL PROTEIN (SPECIAL TESTING): 7 G/DL (ref 6.5–9.1)

## 2018-08-07 LAB
ENA SM IGG SER QL: NEGATIVE
ENA SM+RNP AB SER QL: NEGATIVE
ENA SS-A AB SER QL IA: NEGATIVE
ENA SS-B AB SER QL IA: NEGATIVE

## 2018-08-27 ENCOUNTER — OFFICE VISIT (OUTPATIENT)
Dept: RHEUMATOLOGY | Facility: CLINIC | Age: 48
End: 2018-08-27

## 2018-08-27 VITALS
WEIGHT: 205.31 LBS | HEART RATE: 103 BPM | HEIGHT: 64 IN | SYSTOLIC BLOOD PRESSURE: 129 MMHG | DIASTOLIC BLOOD PRESSURE: 79 MMHG | BODY MASS INDEX: 35.05 KG/M2

## 2018-08-27 DIAGNOSIS — M79.10 MYALGIA: ICD-10-CM

## 2018-08-27 DIAGNOSIS — R76.8 RHEUMATOID FACTOR POSITIVE: ICD-10-CM

## 2018-08-27 DIAGNOSIS — R76.8 POSITIVE ANA (ANTINUCLEAR ANTIBODY): Primary | ICD-10-CM

## 2018-08-27 PROCEDURE — 99212 OFFICE O/P EST SF 10 MIN: CPT | Performed by: INTERNAL MEDICINE

## 2018-08-27 PROCEDURE — 99244 OFF/OP CNSLTJ NEW/EST MOD 40: CPT | Performed by: INTERNAL MEDICINE

## 2018-08-27 NOTE — PATIENT INSTRUCTIONS
1. Check labs   2. Return to clinic if joint pain increases or in 1 year. 3. If anything is positive that needs to be discussed-will call you back   4.  Would check labs year - including RF and CCP -

## 2018-08-27 NOTE — PROGRESS NOTES
Pat Hay is a 50year old female who presents for Patient presents with:  Muscle Pain: generalized   Abnormal Labs: RA factor elevated  . HPI:     I had the pleasure of seeing Pat Hay on 8/27/2018 for evaluation.      She is a pleasan at bedtime.  Disp: 20 g Rfl: 0      Past Medical History:   Diagnosis Date   • Diverticulosis 2011   • Gastritis 2011   • Hiatal hernia    • Insomnia    • Shoulder pain    • Sleep apnea    • Thyroid disorder     antibodies/ thyroid      Past Surgical Histor treated with levothyroxine - she flet better after this - this was prior to her labs positive Rf. , no hx of DM  Joint/Muscluskeltal: see HPI,   All other ROS are negative.      EXAM:   /79 (BP Location: Left arm, Patient Position: Sitting)   Pulse 10 COMPLEMENT C4      18 - 55 mg/dL 20    C-Citrullinated Peptide IgG AB      0.0 - 6.9 U/mL 7.7 (H)      Component      Latest Ref Rng & Units 7/31/2018   Anti-Sjogren's A      Negative Negative   Anti-Sjogren's B      Negative Negative   COMPLEMENT C3 Triglycerides      1 - 149 mg/dL 81   NON HDL CHOL      <130 mg/dL 119   LDL Cholesterol Calc      0 - 99 mg/dL 103 (H)   TSH      0.45 - 5.33 uIU/mL 3.64   Vitamin D, 25OH, Total      ng/mL 16.9   RHEUMATOID FACTOR      <11 IU/mL 103 (H)     Component check both RF and CCP. She also needs to continue her normal prevention screeings like colonscopy and mammogram   - she can return to clinic in 1 year -or earlier if sympotms of joint pain return  like they did in 3/2018     2.  Positive CARIN 1:160 - her

## 2018-09-21 ENCOUNTER — OFFICE VISIT (OUTPATIENT)
Dept: FAMILY MEDICINE CLINIC | Facility: CLINIC | Age: 48
End: 2018-09-21

## 2018-09-21 VITALS
BODY MASS INDEX: 35.34 KG/M2 | SYSTOLIC BLOOD PRESSURE: 112 MMHG | WEIGHT: 207 LBS | HEIGHT: 64 IN | DIASTOLIC BLOOD PRESSURE: 85 MMHG | TEMPERATURE: 99 F | HEART RATE: 74 BPM

## 2018-09-21 DIAGNOSIS — Z23 NEED FOR VACCINATION: ICD-10-CM

## 2018-09-21 DIAGNOSIS — R49.0 RASPY VOICE: ICD-10-CM

## 2018-09-21 DIAGNOSIS — R49.8 WEAKNESS OF VOICE: ICD-10-CM

## 2018-09-21 DIAGNOSIS — R49.9 CHANGE IN VOICE: Primary | ICD-10-CM

## 2018-09-21 PROCEDURE — 90686 IIV4 VACC NO PRSV 0.5 ML IM: CPT | Performed by: FAMILY MEDICINE

## 2018-09-21 PROCEDURE — 90471 IMMUNIZATION ADMIN: CPT | Performed by: FAMILY MEDICINE

## 2018-09-21 PROCEDURE — 99212 OFFICE O/P EST SF 10 MIN: CPT | Performed by: FAMILY MEDICINE

## 2018-09-21 PROCEDURE — 99214 OFFICE O/P EST MOD 30 MIN: CPT | Performed by: FAMILY MEDICINE

## 2018-09-21 RX ORDER — OMEPRAZOLE 20 MG/1
20 CAPSULE, DELAYED RELEASE ORAL
Qty: 90 CAPSULE | Refills: 0 | Status: SHIPPED | OUTPATIENT
Start: 2018-09-21 | End: 2020-03-05

## 2018-09-21 NOTE — PROGRESS NOTES
Patient ID: Simon Tim is a 50year old female. HPI  Patient presents with:  Voice Problem  She states for 5-6 weeks now she has had a raspy voice pretty much every day. Sometimes it is worse than others.   She states yesterday was a very bad d thyroid    Past Surgical History:  2011: COLONOSCOPY  age 21: OTHER SURGICAL HISTORY      Comment:  hand surgery w/ local  2011: UPPER GI ENDOSCOPY; W/ENDOSCOPIC U/S ESOPHAGEAL EXAM      Comment:  with biopsy         Current Outpatient Medications:  Sandra Solo respiratory distress. Lymphadenopathy:     Patient has  no cervical adenopathy. Neurological: Patient is alert and oriented to person, place, and time. Skin: Skin is warm. Psychiatry: Normal mood and affect     Vitals reviewed.          ASSESSMENT/P

## 2018-09-25 ENCOUNTER — OFFICE VISIT (OUTPATIENT)
Dept: OTOLARYNGOLOGY | Facility: CLINIC | Age: 48
End: 2018-09-25

## 2018-09-25 VITALS
BODY MASS INDEX: 35.34 KG/M2 | TEMPERATURE: 99 F | HEIGHT: 64 IN | WEIGHT: 207 LBS | DIASTOLIC BLOOD PRESSURE: 80 MMHG | SYSTOLIC BLOOD PRESSURE: 120 MMHG

## 2018-09-25 DIAGNOSIS — R49.0 HOARSENESS: Primary | ICD-10-CM

## 2018-09-25 PROCEDURE — 99243 OFF/OP CNSLTJ NEW/EST LOW 30: CPT | Performed by: OTOLARYNGOLOGY

## 2018-09-25 PROCEDURE — 31575 DIAGNOSTIC LARYNGOSCOPY: CPT | Performed by: OTOLARYNGOLOGY

## 2018-09-25 PROCEDURE — 99212 OFFICE O/P EST SF 10 MIN: CPT | Performed by: OTOLARYNGOLOGY

## 2018-09-25 RX ORDER — METHYLPREDNISOLONE 4 MG/1
TABLET ORAL
Qty: 1 PACKAGE | Refills: 0 | Status: SHIPPED | OUTPATIENT
Start: 2018-09-25 | End: 2020-03-05 | Stop reason: ALTCHOICE

## 2018-09-26 ENCOUNTER — APPOINTMENT (OUTPATIENT)
Dept: LAB | Age: 48
End: 2018-09-26
Attending: INTERNAL MEDICINE
Payer: COMMERCIAL

## 2018-09-26 DIAGNOSIS — R76.8 RHEUMATOID FACTOR POSITIVE: ICD-10-CM

## 2018-09-26 DIAGNOSIS — M79.10 MYALGIA: ICD-10-CM

## 2018-09-26 DIAGNOSIS — R76.8 POSITIVE ANA (ANTINUCLEAR ANTIBODY): ICD-10-CM

## 2018-09-26 PROCEDURE — 86800 THYROGLOBULIN ANTIBODY: CPT

## 2018-09-26 PROCEDURE — 85730 THROMBOPLASTIN TIME PARTIAL: CPT

## 2018-09-26 PROCEDURE — 83876 ASSAY MYELOPEROXIDASE: CPT

## 2018-09-26 PROCEDURE — 85613 RUSSELL VIPER VENOM DILUTED: CPT

## 2018-09-26 PROCEDURE — 36415 COLL VENOUS BLD VENIPUNCTURE: CPT

## 2018-09-26 PROCEDURE — 85598 HEXAGNAL PHOSPH PLTLT NEUTRL: CPT

## 2018-09-26 PROCEDURE — 82550 ASSAY OF CK (CPK): CPT

## 2018-09-26 PROCEDURE — 82164 ANGIOTENSIN I ENZYME TEST: CPT

## 2018-09-26 PROCEDURE — 86146 BETA-2 GLYCOPROTEIN ANTIBODY: CPT

## 2018-09-26 PROCEDURE — 82085 ASSAY OF ALDOLASE: CPT

## 2018-09-26 PROCEDURE — 85610 PROTHROMBIN TIME: CPT

## 2018-09-26 PROCEDURE — 86147 CARDIOLIPIN ANTIBODY EA IG: CPT

## 2018-09-26 PROCEDURE — 85390 FIBRINOLYSINS SCREEN I&R: CPT

## 2018-09-26 PROCEDURE — 83516 IMMUNOASSAY NONANTIBODY: CPT

## 2018-09-26 PROCEDURE — 86255 FLUORESCENT ANTIBODY SCREEN: CPT

## 2018-09-26 NOTE — PROGRESS NOTES
Yolette Centeno is a 50year old female. Patient presents with:  Voice Problem: raspy voice, comes and goes, feels the need to clear throat for 6-7 wks    HPI:   Her voice has been hoarse and raspy for the last 6-7 weeks. She has no pain.   It seems to Constitutional Normal Overall appearance - Normal.   Head/Face Normal Facial features - Normal. Eyebrows - Normal. Skull - Normal.   Oral/Oropharynx Normal Lips - Normal, Tonsils - Normal, Tongue - Normal    fiberoptic laryngoscopy shows erythema of the 1. Hoarseness  She is experiencing problems with hoarseness and vocal cord swelling and erythema. I recommended a course of oral steroids to see if this will help her symptoms. She is to increase her fluids and avoid clearing her throat.   She is to ret

## 2018-12-18 DIAGNOSIS — E03.9 ACQUIRED HYPOTHYROIDISM: ICD-10-CM

## 2018-12-20 RX ORDER — LEVOTHYROXINE SODIUM 112 UG/1
TABLET ORAL
Qty: 90 TABLET | Refills: 0 | Status: SHIPPED | OUTPATIENT
Start: 2018-12-20 | End: 2019-03-22

## 2018-12-20 NOTE — TELEPHONE ENCOUNTER
Pt is calling for status of her medication refill request. Per pt she is completely out of her medication, and would like to know if this can be refilled today.  Pt can be reached at [phone]

## 2018-12-20 NOTE — TELEPHONE ENCOUNTER
Refill passed per [facility], [facility] protocol.   Hypothyroid Medications  Protocol Criteria:  Appointment scheduled in the past 12 months or the next 3 months  TSH resulted in the past 12 months that is normal  Recent Outpatient Visits            2 months ago [facility], [address] Eddie Bright MD    Office Visit    3 months ago Change in voice    [facility], [facility], [address], [address], [place],     Office Visit    3 months ago Positive CARIN (antinuclear antibody)    [facility], [facility] Halina Roper MD    Office Visit    4 months ago Positive CARIN (antinuclear antibody)    [facility], [facility], [address], [address], [place],     Office Visit    7 months ago Irregular menses    [facility] Phuong Adames MD    Office Visit          Lab Results   Component Value Date    TSH 1.83 07/31/2018

## 2019-03-22 DIAGNOSIS — E03.9 ACQUIRED HYPOTHYROIDISM: ICD-10-CM

## 2019-03-22 RX ORDER — LEVOTHYROXINE SODIUM 112 UG/1
TABLET ORAL
Qty: 90 TABLET | Refills: 0 | Status: SHIPPED | OUTPATIENT
Start: 2019-03-22 | End: 2019-06-12

## 2019-03-23 NOTE — TELEPHONE ENCOUNTER
Refill passed per [facility], [facility] protocol.   Hypothyroid Medications  Protocol Criteria:  Appointment scheduled in the past 12 months or the next 3 months  TSH resulted in the past 12 months that is normal  Recent Outpatient Visits            5 months ago [address] [facility], [address], [address] Robby Mendez MD    Office Visit    6 months ago Change in [place], [address], [address] Marla Dickinson, DO    Office Visit    6 months ago Positive CARIN (antinuclear antibody)    [facility], [address], Ludwin Muhammad MD    Office Visit    7 months ago Positive CARIN (antinuclear antibody)    [facility], [facility], [address], [address] Marla Dickinson, DO    Office Visit    10 months ago Irregular menses    [facility] [place] MD Nisha    Office Visit          Lab Results   Component Value Date    TSH 1.83 07/31/2018

## 2019-06-12 DIAGNOSIS — E03.9 ACQUIRED HYPOTHYROIDISM: ICD-10-CM

## 2019-06-12 RX ORDER — LEVOTHYROXINE SODIUM 112 UG/1
TABLET ORAL
Qty: 90 TABLET | Refills: 1 | Status: SHIPPED | OUTPATIENT
Start: 2019-06-12 | End: 2019-12-17

## 2019-06-13 NOTE — TELEPHONE ENCOUNTER
Refill passed per [facility], [facility] protocol.   Hypothyroid Medications  Protocol Criteria:  Appointment scheduled in the past 12 months or the next 3 months  TSH resulted in the past 12 months that is normal  Recent Outpatient Visits            8 months ago

## 2019-12-17 DIAGNOSIS — E03.9 ACQUIRED HYPOTHYROIDISM: ICD-10-CM

## 2019-12-17 NOTE — TELEPHONE ENCOUNTER
CSS, please contact patient and assist in scheduling overdue f/u or Px as LOV= 9/21/18. Banno message sent informing pt.

## 2019-12-27 RX ORDER — LEVOTHYROXINE SODIUM 112 UG/1
TABLET ORAL
Qty: 30 TABLET | Refills: 0 | Status: SHIPPED | OUTPATIENT
Start: 2019-12-27 | End: 2020-01-18

## 2019-12-27 NOTE — TELEPHONE ENCOUNTER
Patient called is requesting refill for levothyroxine. She is out of medication. Patient states she didn't have insurance and will have insurance starting January 1,2020 and will make appointment for physical in January. Medication pended. Please advise.

## 2020-01-18 DIAGNOSIS — E03.9 ACQUIRED HYPOTHYROIDISM: ICD-10-CM

## 2020-01-20 RX ORDER — LEVOTHYROXINE SODIUM 112 UG/1
112 TABLET ORAL
Qty: 30 TABLET | Refills: 0 | Status: SHIPPED | OUTPATIENT
Start: 2020-01-20 | End: 2020-03-05

## 2020-01-20 NOTE — TELEPHONE ENCOUNTER
Not seen since 2018. She stated she was going to get insurance in January. I gave her a 30-day supply of thyroid medication.   Long overdue for exam.

## 2020-03-03 DIAGNOSIS — E03.9 ACQUIRED HYPOTHYROIDISM: ICD-10-CM

## 2020-03-03 RX ORDER — LEVOTHYROXINE SODIUM 112 UG/1
112 TABLET ORAL
Qty: 30 TABLET | Refills: 0 | Status: CANCELLED | OUTPATIENT
Start: 2020-03-03

## 2020-03-05 ENCOUNTER — OFFICE VISIT (OUTPATIENT)
Dept: FAMILY MEDICINE CLINIC | Facility: CLINIC | Age: 50
End: 2020-03-05

## 2020-03-05 VITALS
WEIGHT: 212 LBS | DIASTOLIC BLOOD PRESSURE: 98 MMHG | BODY MASS INDEX: 36.19 KG/M2 | SYSTOLIC BLOOD PRESSURE: 142 MMHG | TEMPERATURE: 99 F | HEART RATE: 77 BPM | HEIGHT: 64 IN

## 2020-03-05 DIAGNOSIS — R07.89 ANTERIOR CHEST WALL PAIN: Primary | ICD-10-CM

## 2020-03-05 DIAGNOSIS — M94.0 COSTOCHONDRITIS: ICD-10-CM

## 2020-03-05 DIAGNOSIS — E03.9 ACQUIRED HYPOTHYROIDISM: ICD-10-CM

## 2020-03-05 DIAGNOSIS — S46.819A STRAIN OF TRAPEZIUS MUSCLE, UNSPECIFIED LATERALITY, INITIAL ENCOUNTER: ICD-10-CM

## 2020-03-05 PROCEDURE — 99213 OFFICE O/P EST LOW 20 MIN: CPT | Performed by: FAMILY MEDICINE

## 2020-03-05 RX ORDER — LEVOTHYROXINE SODIUM 112 UG/1
112 TABLET ORAL
Qty: 30 TABLET | Refills: 0 | Status: SHIPPED | OUTPATIENT
Start: 2020-03-05 | End: 2020-03-26

## 2020-03-05 RX ORDER — CYCLOBENZAPRINE HCL 10 MG
10 TABLET ORAL NIGHTLY
Qty: 30 TABLET | Refills: 0 | Status: SHIPPED | OUTPATIENT
Start: 2020-03-05 | End: 2020-04-04

## 2020-03-05 NOTE — PROGRESS NOTES
Patient ID: Destiny Nolen is a 52year old female. HPI  Patient presents with:  Medication Request: refills on thyroid medication  Muscle Pain: chest muscle pain    Pt will hopefully have insurance in a few months.  She continues to work in real [employer] HGB 12.7 02/27/2018    HCT 37.8 02/27/2018     02/27/2018    MPV 9.4 02/27/2018    MCV 95.1 02/27/2018    MCH 31.9 02/27/2018    MCHC 33.6 02/27/2018    RDW 13.2 02/27/2018    NEUT 3.5 12/09/2014    LYMPH 1.3 12/09/2014    MON 0.3 12/09/2014    E HDL 56 02/27/2018     (H) 02/27/2018    NONHDLC 119 02/27/2018    CALCNONHDL 135 (H) 12/09/2014     T4,FREE (S) (ng/dL)   Date Value   06/13/2016 1.11     TSH (uIU/mL)   Date Value   07/31/2018 1.83     TSH (S) (uIU/mL)   Date Value   06/13/2016 0.7 once daily. 30 tablet 0   • Multiple Vitamin (MULTI-VITAMINS) Oral Tab Take  by mouth.  take 1 tablet by oral route  every day with food       Allergies:  Mold                    ITCHING   PHYSICAL EXAM:   Physical Exam   Physical Exam   Constitutional: Jean Carlos Jaimes gets off and on was much less frequent. I think cyclobenzaprine could be helpful for her  Acquired hypothyroidism  -     Levothyroxine Sodium 112 MCG Oral Tab;  Take 1 tablet (112 mcg total) by mouth once daily.  -     ASSAY, THYROID STIM HORMONE  Go ahead 3/5/2020, 12:03 PM.    I, Joseph Verdin DO,  personally performed the services described in this documentation. All medical record entries made by the scribe were at my direction and in my presence.   I have reviewed the chart and discharge instructions (i

## 2020-03-05 NOTE — PATIENT INSTRUCTIONS
Start checking your blood pressures at home. BLOOD PRESSURE CUFF    Get a blood pressure cuff that goes on the arm. Do not get the wrist cuff.   Check your blood pressures on your left arm 2-3 times per week at [facility]

## 2020-03-24 ENCOUNTER — TELEPHONE (OUTPATIENT)
Dept: FAMILY MEDICINE CLINIC | Facility: CLINIC | Age: 50
End: 2020-03-24

## 2020-03-26 LAB — TSH: 2.18 MIU/L

## 2020-03-28 ENCOUNTER — PATIENT MESSAGE (OUTPATIENT)
Dept: FAMILY MEDICINE CLINIC | Facility: CLINIC | Age: 50
End: 2020-03-28

## 2020-03-30 ENCOUNTER — NURSE TRIAGE (OUTPATIENT)
Dept: FAMILY MEDICINE CLINIC | Facility: CLINIC | Age: 50
End: 2020-03-30

## 2020-03-30 NOTE — TELEPHONE ENCOUNTER
From: Breann Phelan  To:  Kimberly Villarreal DO  Sent: 3/28/2020 4:01 PM CDT  Subject: Non-Urgent Medical Question    Dr. Kayla Cleveland,    For the last 2 weeks I have been watching 2 cats for someone and have moved stuff out of my parents home and into my home b

## 2020-03-30 NOTE — TELEPHONE ENCOUNTER
Advised to call Triage    ----- Message from Rosa Villarreal sent at 3/28/2020  4:01 PM CDT -----  Regarding: Non-Urgent Medical Question  Contact: [phone]  Dr. Rosi Abdi,    For the last 2 weeks I have been watching 2 cats for someone and have moved

## 2020-03-31 RX ORDER — ALBUTEROL SULFATE 90 UG/1
2 AEROSOL, METERED RESPIRATORY (INHALATION) EVERY 4 HOURS PRN
Qty: 18 G | Refills: 5 | Status: SHIPPED | OUTPATIENT
Start: 2020-03-31 | End: 2020-10-02

## 2020-03-31 RX ORDER — MONTELUKAST SODIUM 10 MG/1
10 TABLET ORAL DAILY
Qty: 90 TABLET | Refills: 3 | Status: SHIPPED | OUTPATIENT
Start: 2020-03-31 | End: 2020-07-31

## 2020-03-31 NOTE — TELEPHONE ENCOUNTER
Pt called and states that she has been exposed to parent's belongings and watching her friends cats. Her parent's belongings have always made her allergies act up.  Between parent's belongings, spring springing and the cats, her allergies are really acting

## 2020-03-31 NOTE — TELEPHONE ENCOUNTER
Action Requested: Summary for Provider     []  Critical Lab, Recommendations Needed  [] Need Additional Advice  []   FYI    []   Need Orders  [] Need Medications Sent to Pharmacy  []  Other     SUMMARY: Pt report chest tightness x 1 week.  Pt states she mov

## 2020-05-06 ENCOUNTER — TELEPHONE (OUTPATIENT)
Dept: FAMILY MEDICINE CLINIC | Facility: CLINIC | Age: 50
End: 2020-05-06

## 2020-05-06 NOTE — TELEPHONE ENCOUNTER
Condition update   Was watching her parents cats and has bad allergy symptoms from cats. Decided to clean and disinfect house yesterday when cats left and was mopping basement floor with lysol and then sprayed floor with mixture of bleach and water.   Star

## 2020-05-26 ENCOUNTER — TELEPHONE (OUTPATIENT)
Dept: FAMILY MEDICINE CLINIC | Facility: CLINIC | Age: 50
End: 2020-05-26

## 2020-05-26 NOTE — TELEPHONE ENCOUNTER
Spoke with pt,  verified, pt stated she developed allergy 2 mos ago, pt stated she was given meds (albuterol inhaler, montelukast and benadryl) by on call costor for allergy and it helped.     Pt stated she brought a lot of stuff from her parents home li

## 2020-05-28 ENCOUNTER — VIRTUAL PHONE E/M (OUTPATIENT)
Dept: FAMILY MEDICINE CLINIC | Facility: CLINIC | Age: 50
End: 2020-05-28

## 2020-05-28 DIAGNOSIS — R06.00 DYSPNEA, UNSPECIFIED TYPE: Primary | ICD-10-CM

## 2020-05-28 DIAGNOSIS — L29.9 LOCALIZED PRURITUS: ICD-10-CM

## 2020-05-28 DIAGNOSIS — F41.9 ANXIETY: ICD-10-CM

## 2020-05-28 DIAGNOSIS — R09.81 NASAL CONGESTION: ICD-10-CM

## 2020-05-28 PROCEDURE — 99442 PHONE E/M BY PHYS 11-20 MIN: CPT | Performed by: FAMILY MEDICINE

## 2020-05-28 NOTE — PROGRESS NOTES
TELEPHONE VISIT PROGRESS NOTE  Todays date: 5/28/2020 10:38 AM        Most recent Nurse Triage message / [facility] [id number] message from patient:      Siddharth Harris RN   Registered Nurse      Telephone Encounter   Signed   Encounter Date:  5/26/2020 telephone , verified date of birth, and discussed current concerns. If patient is a child then of course the parent or guardian will be giving the verbal consent for the virtual check-in and of course I will be speaking to this person for this visit. She spoke to ALICIA Sevilla regarding her allergy sx and was prescribed Singulair, an inhaler, and benadryl. She was told to clean her house. She has cleaned her entire house but continues to have allergy sx.      She suspects she may have an [place] No []     • Diabetes: Yes []     No [x]      • Heart disease: Yes []     No [x]      • Travel: Yes []     No [x]      • Sick contacts: Yes []     No [x]       • Occupation/large gatherings:  In real estate  • Other:  SOB due to allergies      Treatments tri she has.   She will see a allergist.  I told her to keep an article of clothing that has polyester in it that she thinks she is allergic to and put it into bag and bring it to the allergist.  She states that she is not allergic to all polyester and there ar by mouth daily. 90 tablet 3   • Levothyroxine Sodium 112 MCG Oral Tab Take 1 tablet (112 mcg total) by mouth once daily. 90 tablet 1   • Multiple Vitamin (MULTI-VITAMINS) Oral Tab Take  by mouth.  take 1 tablet by oral route  every day with food

## 2020-06-01 ENCOUNTER — NURSE ONLY (OUTPATIENT)
Dept: ALLERGY | Facility: CLINIC | Age: 50
End: 2020-06-01

## 2020-06-01 DIAGNOSIS — J30.89 ENVIRONMENTAL AND SEASONAL ALLERGIES: ICD-10-CM

## 2020-06-01 PROCEDURE — 95024 IQ TESTS W/ALLERGENIC XTRCS: CPT | Performed by: ALLERGY & IMMUNOLOGY

## 2020-06-01 PROCEDURE — 95004 PERQ TESTS W/ALRGNC XTRCS: CPT | Performed by: ALLERGY & IMMUNOLOGY

## 2020-06-01 NOTE — PATIENT INSTRUCTIONS
1.        1.  Burning sensation of her skin. Not typically associated with a  rash. Dermatographia screen appears negative.   Recs: Recommend to cut down on the use of chemical cleansers including bleach  Consider Priya Cluck is a soap or Cetaphil as a cleanser

## 2020-06-01 NOTE — PROGRESS NOTES
Lindsay Schroeder is a 52year old female. HPI:   Patient presents with:  New Patient: Referrred by Dr. Kiran Kumar for Environemental Allergies.      Patient is a 51-year-old female who presents for allergy consultation upon referral of her PCP, Dr. Kiran Kumar surgery w/ local   • UPPER GI ENDOSCOPY; W/ENDOSCOPIC U/S ESOPHAGEAL EXAM  2011    with biopsy      Family History   Problem Relation Age of Onset   • Diabetes Father         type 1   • Other (gout) Father    • Other (arthritis) Father    • Other (arthriti diarrhea and vomiting  Genitourinary:  Negative for dysuria and hematuria  Hema/Lymph:  Negative for easy bleeding and easy bruising  Integumentary:  Negative for pruritus and rash  Musculoskeletal:  Negative for joint symptoms  Neurological:  Negative for plastics would recommend for patient to avoid if she feels they are contributing to symptoms recommend cotton as a fabric it is usually is the most hypoallergenic fabric available  Recommend follow-up with PCP to consider baseline labs including CBC CMP TS to self administration of these medications. Teaching, instruction and sample was provided to the patient by myself. Teaching included  a review of potential adverse side effects as well as potential efficacy.   Patient's questions were answered in regard

## 2020-06-02 ENCOUNTER — TELEPHONE (OUTPATIENT)
Dept: ALLERGY | Facility: CLINIC | Age: 50
End: 2020-06-02

## 2020-06-29 RX ORDER — LEVOCETIRIZINE DIHYDROCHLORIDE 5 MG/1
TABLET, FILM COATED ORAL
Qty: 90 TABLET | Refills: 1 | Status: SHIPPED | OUTPATIENT
Start: 2020-06-29 | End: 2021-01-19

## 2020-06-29 NOTE — TELEPHONE ENCOUNTER
Refill requested for    Name from pharmacy: LEVOCETIRIZINE 5MG TABLETS         Will file in chart as: LEVOCETIRIZINE DIHYDROCHLORIDE 5 MG Oral Tab         Sig: TAKE 1 TABLET(5 MG) BY MOUTH EVERY NIGHT    Disp:  30 tablet    Refills:  0 (Pharmacy requested:

## 2020-07-31 ENCOUNTER — OFFICE VISIT (OUTPATIENT)
Dept: FAMILY MEDICINE CLINIC | Facility: CLINIC | Age: 50
End: 2020-07-31
Payer: COMMERCIAL

## 2020-07-31 VITALS
HEIGHT: 64 IN | WEIGHT: 195 LBS | TEMPERATURE: 97 F | SYSTOLIC BLOOD PRESSURE: 147 MMHG | DIASTOLIC BLOOD PRESSURE: 98 MMHG | HEART RATE: 75 BPM | BODY MASS INDEX: 33.29 KG/M2

## 2020-07-31 DIAGNOSIS — M25.561 ACUTE PAIN OF RIGHT KNEE: Primary | ICD-10-CM

## 2020-07-31 DIAGNOSIS — M76.32 ILIOTIBIAL BAND SYNDROME OF LEFT SIDE: ICD-10-CM

## 2020-07-31 DIAGNOSIS — M25.552 LEFT HIP PAIN: ICD-10-CM

## 2020-07-31 PROCEDURE — 99213 OFFICE O/P EST LOW 20 MIN: CPT | Performed by: FAMILY MEDICINE

## 2020-07-31 PROCEDURE — 3080F DIAST BP >= 90 MM HG: CPT | Performed by: FAMILY MEDICINE

## 2020-07-31 PROCEDURE — 3008F BODY MASS INDEX DOCD: CPT | Performed by: FAMILY MEDICINE

## 2020-07-31 PROCEDURE — 3077F SYST BP >= 140 MM HG: CPT | Performed by: FAMILY MEDICINE

## 2020-07-31 RX ORDER — DICLOFENAC SODIUM 75 MG/1
75 TABLET, DELAYED RELEASE ORAL 2 TIMES DAILY
Qty: 30 TABLET | Refills: 1 | Status: SHIPPED | OUTPATIENT
Start: 2020-07-31 | End: 2021-04-01

## 2020-07-31 NOTE — PROGRESS NOTES
HPI:    Patient ID: Ketan Saxena is a 48year old female. HPI  Right knee pain. No injury . Left hip pain no injury. No past injuries. On her feet and stair climbing often. No swelling. Sleeps well. Has tried otc.    Review of Systems   Constitu defined types were placed in this encounter. Meds This Visit:  Requested Prescriptions     Signed Prescriptions Disp Refills   • Diclofenac Sodium 75 MG Oral Tab EC 30 tablet 1     Sig: Take 1 tablet (75 mg total) by mouth 2 (two) times daily.        I

## 2020-08-06 ENCOUNTER — TELEPHONE (OUTPATIENT)
Dept: FAMILY MEDICINE CLINIC | Facility: CLINIC | Age: 50
End: 2020-08-06

## 2020-08-06 NOTE — TELEPHONE ENCOUNTER
Patient seen by Dr. Garett Rodriguez on 7/31 for acute pain of right knee and informed of tendonitis. States when going downstairs this morning, she felt something \"pull\" in her knee and that it is difficult to walk on.   Informed patient to elevate leg, ice and

## 2020-08-07 NOTE — TELEPHONE ENCOUNTER
Patient seen in [facility] [facility] with Dr. Vanessa Jeff on 7/3/20 for right knee pain and was Given instruction on home exercises for the right knee and left ITB and prescribed  Diclofenac Sodium 75 MG.    Patient calling about her knee pain again today and stated \"I didn't r

## 2020-08-08 NOTE — TELEPHONE ENCOUNTER
Get her in tomorrow to see me. Before 10 AM.  Will be a focused visit on the knee please as I am fully booked.

## 2020-08-10 ENCOUNTER — HOSPITAL ENCOUNTER (OUTPATIENT)
Dept: GENERAL RADIOLOGY | Age: 50
Discharge: HOME | End: 2020-08-10
Attending: FAMILY MEDICINE
Payer: COMMERCIAL

## 2020-08-10 ENCOUNTER — OFFICE VISIT (OUTPATIENT)
Dept: FAMILY MEDICINE CLINIC | Facility: CLINIC | Age: 50
End: 2020-08-10
Payer: COMMERCIAL

## 2020-08-10 VITALS
HEIGHT: 64 IN | BODY MASS INDEX: 33.4 KG/M2 | DIASTOLIC BLOOD PRESSURE: 90 MMHG | SYSTOLIC BLOOD PRESSURE: 133 MMHG | WEIGHT: 195.63 LBS | HEART RATE: 85 BPM | TEMPERATURE: 98 F

## 2020-08-10 DIAGNOSIS — M79.89 SWELLING OF LEFT FOOT: ICD-10-CM

## 2020-08-10 DIAGNOSIS — M25.561 ACUTE PAIN OF RIGHT KNEE: ICD-10-CM

## 2020-08-10 DIAGNOSIS — M25.561 ACUTE PAIN OF RIGHT KNEE: Primary | ICD-10-CM

## 2020-08-10 DIAGNOSIS — L84 CALLUS OF FOOT: ICD-10-CM

## 2020-08-10 PROCEDURE — 3008F BODY MASS INDEX DOCD: CPT | Performed by: FAMILY MEDICINE

## 2020-08-10 PROCEDURE — 99214 OFFICE O/P EST MOD 30 MIN: CPT | Performed by: FAMILY MEDICINE

## 2020-08-10 PROCEDURE — 3075F SYST BP GE 130 - 139MM HG: CPT | Performed by: FAMILY MEDICINE

## 2020-08-10 PROCEDURE — 3080F DIAST BP >= 90 MM HG: CPT | Performed by: FAMILY MEDICINE

## 2020-08-10 PROCEDURE — 73562 X-RAY EXAM OF KNEE 3: CPT | Performed by: FAMILY MEDICINE

## 2020-08-10 NOTE — PROGRESS NOTES
Patient ID: Han Aguirre is a 48year old female. HPI  Patient presents with:  Knee Pain: right knee pain and discomfort     Last seen by me on 3/5/2020.     Pt has been experiencing right knee pain that began while cleaning her house to prepare f 134/91  03/05/20 : (!) 142/98  09/25/18 : 120/80  09/21/18 : 112/85        Review of Systems   Constitutional: Negative for chills and fever. HENT: Negative for voice change. Respiratory: Negative for chest tightness and shortness of breath.     Cardio to person, place, and time. Patient appears well-developed and well-nourished. No distress. Head: Normocephalic. Eyes: Conjunctivae and EOM are normal.   Neurological: Patient is alert and oriented to person, place, and time. Skin: Skin is warm.    Ps PODIATRY - INTERNAL  See podiatry.   Swelling of left foot  -     PODIATRY - INTERNAL        Referrals (if applicable)  Orders Placed This Encounter      Podiatry Referral [facility] ([place], Dr. Carol Miranda)          Order Comments:              ++++++

## 2020-08-31 ENCOUNTER — TELEPHONE (OUTPATIENT)
Dept: FAMILY MEDICINE CLINIC | Facility: CLINIC | Age: 50
End: 2020-08-31

## 2020-08-31 NOTE — TELEPHONE ENCOUNTER
Pt stts she would like a referral to see a specialist for her right knee. Pt is aware she doesn't need a referral but wants Dr to recommend who he knows.  Please advise

## 2020-09-01 NOTE — TELEPHONE ENCOUNTER
Have her see Dr. Curtis Arroyo or Dr. Ketan Rebolledo. Give her the # of   [facility] orthopedics.

## 2020-09-03 ENCOUNTER — HOSPITAL ENCOUNTER (OUTPATIENT)
Dept: GENERAL RADIOLOGY | Facility: HOSPITAL | Age: 50
Discharge: HOME | End: 2020-09-03
Attending: PODIATRIST
Payer: COMMERCIAL

## 2020-09-03 ENCOUNTER — OFFICE VISIT (OUTPATIENT)
Dept: PODIATRY CLINIC | Facility: CLINIC | Age: 50
End: 2020-09-03
Payer: COMMERCIAL

## 2020-09-03 VITALS
HEART RATE: 78 BPM | SYSTOLIC BLOOD PRESSURE: 142 MMHG | WEIGHT: 195 LBS | DIASTOLIC BLOOD PRESSURE: 82 MMHG | BODY MASS INDEX: 33.29 KG/M2 | HEIGHT: 64 IN

## 2020-09-03 DIAGNOSIS — M77.52 BURSITIS OF LEFT FOOT: ICD-10-CM

## 2020-09-03 DIAGNOSIS — M21.622 TAILOR'S BUNION OF LEFT FOOT: Primary | ICD-10-CM

## 2020-09-03 DIAGNOSIS — M21.622 TAILOR'S BUNION OF LEFT FOOT: ICD-10-CM

## 2020-09-03 PROCEDURE — 3008F BODY MASS INDEX DOCD: CPT | Performed by: PODIATRIST

## 2020-09-03 PROCEDURE — 3079F DIAST BP 80-89 MM HG: CPT | Performed by: PODIATRIST

## 2020-09-03 PROCEDURE — 73630 X-RAY EXAM OF FOOT: CPT | Performed by: PODIATRIST

## 2020-09-03 PROCEDURE — 99203 OFFICE O/P NEW LOW 30 MIN: CPT | Performed by: PODIATRIST

## 2020-09-03 PROCEDURE — 20600 DRAIN/INJ JOINT/BURSA W/O US: CPT | Performed by: PODIATRIST

## 2020-09-03 PROCEDURE — 3077F SYST BP >= 140 MM HG: CPT | Performed by: PODIATRIST

## 2020-09-03 RX ORDER — TRIAMCINOLONE ACETONIDE 40 MG/ML
20 INJECTION, SUSPENSION INTRA-ARTICULAR; INTRAMUSCULAR ONCE
Status: COMPLETED | OUTPATIENT
Start: 2020-09-03 | End: 2020-09-03

## 2020-09-03 RX ADMIN — TRIAMCINOLONE ACETONIDE 20 MG: 40 INJECTION, SUSPENSION INTRA-ARTICULAR; INTRAMUSCULAR at 16:00:00

## 2020-09-03 NOTE — PROGRESS NOTES
Reginaldo Sommer is a 48year old female. Patient presents with:  Consult: Lump on side of Left foot x 1 year. denies any pain, swelling, redness, fever/chills.          HPI:   Patient presents complaining of a lump on the side of her left foot she did ha status:       Spouse name: Not on file      Number of children: Not on file      Years of education: Not on file      Highest education level: Not on file    Tobacco Use      Smoking status: Never Smoker      Smokeless tobacco: Never Used      [employer] triamcinolone acetonide (KENALOG-40) 40 MG/ML injection 20 mg    Bursitis of left foot  -     XR FOOT WEIGHTBEARING (3 VIEWS), LEFT (CPT=73630); Future  -     triamcinolone acetonide (KENALOG-40) 40 MG/ML injection 20 mg        Plan:  Today I recommended a

## 2020-09-29 ENCOUNTER — OFFICE VISIT (OUTPATIENT)
Dept: ORTHOPEDICS CLINIC | Facility: CLINIC | Age: 50
End: 2020-09-29
Payer: COMMERCIAL

## 2020-09-29 DIAGNOSIS — M23.91 INTERNAL DERANGEMENT OF RIGHT KNEE: Primary | ICD-10-CM

## 2020-09-29 PROCEDURE — 99244 OFF/OP CNSLTJ NEW/EST MOD 40: CPT | Performed by: ORTHOPAEDIC SURGERY

## 2020-09-29 NOTE — H&P
NURSING INTAKE COMMENTS: Patient presents with:  Knee Pain: right knee pain--started couple months ago.  hurt knee whilie cleaning her house. heard a pop behind her knee.       HPI: This 48year old female presents today with complaints of right knee pain s Onset   • Diabetes Father         type 1   • Other (gout) Father    • Other (arthritis) Father    • Other (arthritis) Mother    • Other (psoriasis) Son      No family Hx of DVT/PE    Social History    Occupational History      Not on file    Tobacco Use COMPARISON: None. INDICATIONS:  Right knee pain x3 weeks worse x5 days. No known injury. TECHNIQUE:    3 views were obtained with weightbearing technique.     FINDINGS:          BONES:             Normal. No significant arthropathy, fracture or ac

## 2020-10-01 ENCOUNTER — OFFICE VISIT (OUTPATIENT)
Dept: PODIATRY CLINIC | Facility: CLINIC | Age: 50
End: 2020-10-01
Payer: COMMERCIAL

## 2020-10-01 VITALS — BODY MASS INDEX: 33.29 KG/M2 | HEIGHT: 64 IN | WEIGHT: 195 LBS

## 2020-10-01 DIAGNOSIS — M77.52 BURSITIS OF LEFT FOOT: ICD-10-CM

## 2020-10-01 DIAGNOSIS — M21.622 TAILOR'S BUNION OF LEFT FOOT: Primary | ICD-10-CM

## 2020-10-01 PROCEDURE — 99213 OFFICE O/P EST LOW 20 MIN: CPT | Performed by: PODIATRIST

## 2020-10-01 PROCEDURE — 3008F BODY MASS INDEX DOCD: CPT | Performed by: PODIATRIST

## 2020-10-02 ENCOUNTER — TELEPHONE (OUTPATIENT)
Dept: ORTHOPEDICS CLINIC | Facility: CLINIC | Age: 50
End: 2020-10-02

## 2020-10-02 ENCOUNTER — OFFICE VISIT (OUTPATIENT)
Dept: FAMILY MEDICINE CLINIC | Facility: CLINIC | Age: 50
End: 2020-10-02
Payer: COMMERCIAL

## 2020-10-02 VITALS
TEMPERATURE: 100 F | SYSTOLIC BLOOD PRESSURE: 125 MMHG | HEART RATE: 76 BPM | HEIGHT: 64 IN | WEIGHT: 186.38 LBS | DIASTOLIC BLOOD PRESSURE: 94 MMHG | BODY MASS INDEX: 31.82 KG/M2

## 2020-10-02 DIAGNOSIS — Z00.00 ADULT GENERAL MEDICAL EXAM: Primary | ICD-10-CM

## 2020-10-02 DIAGNOSIS — J30.89 OTHER ALLERGIC RHINITIS: ICD-10-CM

## 2020-10-02 DIAGNOSIS — Z12.11 SCREENING FOR COLON CANCER: ICD-10-CM

## 2020-10-02 DIAGNOSIS — Z12.31 VISIT FOR SCREENING MAMMOGRAM: ICD-10-CM

## 2020-10-02 DIAGNOSIS — K64.4 EXTERNAL HEMORRHOIDS: ICD-10-CM

## 2020-10-02 DIAGNOSIS — E03.9 ACQUIRED HYPOTHYROIDISM: ICD-10-CM

## 2020-10-02 DIAGNOSIS — Z23 NEED FOR VACCINATION: ICD-10-CM

## 2020-10-02 PROCEDURE — 90750 HZV VACC RECOMBINANT IM: CPT | Performed by: FAMILY MEDICINE

## 2020-10-02 PROCEDURE — 3080F DIAST BP >= 90 MM HG: CPT | Performed by: FAMILY MEDICINE

## 2020-10-02 PROCEDURE — 3008F BODY MASS INDEX DOCD: CPT | Performed by: FAMILY MEDICINE

## 2020-10-02 PROCEDURE — 99396 PREV VISIT EST AGE 40-64: CPT | Performed by: FAMILY MEDICINE

## 2020-10-02 PROCEDURE — 3074F SYST BP LT 130 MM HG: CPT | Performed by: FAMILY MEDICINE

## 2020-10-02 PROCEDURE — 90686 IIV4 VACC NO PRSV 0.5 ML IM: CPT | Performed by: FAMILY MEDICINE

## 2020-10-02 PROCEDURE — 90471 IMMUNIZATION ADMIN: CPT | Performed by: FAMILY MEDICINE

## 2020-10-02 PROCEDURE — 90472 IMMUNIZATION ADMIN EACH ADD: CPT | Performed by: FAMILY MEDICINE

## 2020-10-02 RX ORDER — LEVOTHYROXINE SODIUM 112 UG/1
112 TABLET ORAL
Qty: 90 TABLET | Refills: 1 | Status: SHIPPED | OUTPATIENT
Start: 2020-10-02 | End: 2021-04-10

## 2020-10-02 NOTE — PROGRESS NOTES
Patient ID: Kanu Acosta is a 48year old female. HPI  Patient presents with:  Physical    Last physical on 12/15/2017. Therefore we will make this a physical as well. She also has other complaints though.     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 90 tablet 1   • Multiple Vitamin (MULTI-VITAMINS) Oral Tab Take  by mouth.  take 1 tablet by oral route  every day with food       Allergies:  Dust Mites her labs are quite good. We talked about the importance of all the screening test and she is happy to get these done. Visit for screening mammogram  -     MARCO ANTONIO SCREENING BILAT (CPT=77067);  Future    Screening for colon cancer  -     GASTRO - INTERNAL    E presence of Chanelle Brown DO. Electronically Signed: Kandice Espinoza, 10/2/2020, 10:58 AM.    I, Chanelle Brown DO,  personally performed the services described in this documentation.  All medical record entries made by the scribe were at my direction and

## 2020-10-02 NOTE — PATIENT INSTRUCTIONS
Return to clinic after December 2, 2020 for the second Shingrix shot with a nurse visit. That is your second shingles injection.

## 2020-10-04 NOTE — PROGRESS NOTES
Jp Melgoza is a 48year old female. Patient presents with: Follow - Up: Lump Left Foot - Pain 0/10 - Injection 2-3 weeks ago.         HPI:   Patient is here for follow-up on the foot lump which was injected about 2 to 3 weeks ago it was a bursitis Smokeless tobacco: Never Used      Tobacco comment: No Household Smokers. Substance and Sexual Activity      Alcohol use: Yes        Alcohol/week: 0.0 standard drinks        Comment: occ      Drug use: No      Sexual activity: Yes        Partners:  Male

## 2020-10-05 ENCOUNTER — TELEPHONE (OUTPATIENT)
Dept: GASTROENTEROLOGY | Facility: CLINIC | Age: 50
End: 2020-10-05

## 2020-10-05 ENCOUNTER — OFFICE VISIT (OUTPATIENT)
Dept: GASTROENTEROLOGY | Facility: CLINIC | Age: 50
End: 2020-10-05
Payer: COMMERCIAL

## 2020-10-05 VITALS
HEIGHT: 64 IN | WEIGHT: 184 LBS | BODY MASS INDEX: 31.41 KG/M2 | HEART RATE: 98 BPM | SYSTOLIC BLOOD PRESSURE: 129 MMHG | DIASTOLIC BLOOD PRESSURE: 90 MMHG

## 2020-10-05 DIAGNOSIS — Z12.11 ENCOUNTER FOR SCREENING COLONOSCOPY: Primary | ICD-10-CM

## 2020-10-05 DIAGNOSIS — Z12.11 COLON CANCER SCREENING: Primary | ICD-10-CM

## 2020-10-05 DIAGNOSIS — Z86.19 HISTORY OF HELICOBACTER PYLORI INFECTION: ICD-10-CM

## 2020-10-05 PROCEDURE — 3008F BODY MASS INDEX DOCD: CPT | Performed by: INTERNAL MEDICINE

## 2020-10-05 PROCEDURE — 3080F DIAST BP >= 90 MM HG: CPT | Performed by: INTERNAL MEDICINE

## 2020-10-05 PROCEDURE — 3074F SYST BP LT 130 MM HG: CPT | Performed by: INTERNAL MEDICINE

## 2020-10-05 PROCEDURE — 99243 OFF/OP CNSLTJ NEW/EST LOW 30: CPT | Performed by: INTERNAL MEDICINE

## 2020-10-05 RX ORDER — SODIUM, POTASSIUM,MAG SULFATES 17.5-3.13G
SOLUTION, RECONSTITUTED, ORAL ORAL
Qty: 1 BOTTLE | Refills: 0 | Status: ON HOLD | OUTPATIENT
Start: 2020-10-05 | End: 2020-10-09

## 2020-10-05 NOTE — H&P
[address] [facility] [address] Gastroenterology                                                                                                  Clinic History and Physical     Pa Onset   • Diabetes Father         type 1   • Other (gout) Father    • Other (arthritis) Father    • Cancer Mother    • Other (arthritis) Mother    • Other (psoriasis) Son       Social History: Social History    Tobacco Use      Smoking status: Never Smoker and in no acute discomfort  HEENT: the sclera appears anicteric, oropharynx clear, mucus membranes appear moist  CV- regular rate and rhythm, the extremities are warm and well perfused   Lung- Moves air well;  No labored breathing  Abdomen- soft, non-tender to proceed with colonoscopy.     # LLQ pain, very short term    Recommend:  - H pylori stool test  - Schedule colonoscopy w/ MAC sedation  - Prep: Split dose Colyte or equivalent    ** If MAC @ [facility]:    - NO alcohol, recreational drugs nor erectile dysfun

## 2020-10-05 NOTE — TELEPHONE ENCOUNTER
Scheduled for: Colonoscopy 12594  Provider Name: Dr Jarrod Metz   Date: Fri 10/09/2020   Location: [facility]   Sedation: MAC   Time: 10:00 am   Prep: split dose suprep/trilyte   Meds/Allergies Reconciled?  Reviewed by provider  Diagnosis with codes: Screening Z12.11

## 2020-10-05 NOTE — PATIENT INSTRUCTIONS
LUQ pain-- watch triggers, can try famotidine. Will order stool H pylori test    1. Schedule colonoscopy with MAC [Diagnosis: screening]    2.  bowel prep from pharmacy (split suprep or trilyte)    3.  Continue all medications for procedure except

## 2020-10-05 NOTE — H&P (VIEW-ONLY)
[address] [facility] [address] Gastroenterology                                                                                                  Clinic History and Physical     Pa Onset   • Diabetes Father         type 1   • Other (gout) Father    • Other (arthritis) Father    • Cancer Mother    • Other (arthritis) Mother    • Other (psoriasis) Son       Social History: Social History    Tobacco Use      Smoking status: Never Smoker and in no acute discomfort  HEENT: the sclera appears anicteric, oropharynx clear, mucus membranes appear moist  CV- regular rate and rhythm, the extremities are warm and well perfused   Lung- Moves air well;  No labored breathing  Abdomen- soft, non-tender to proceed with colonoscopy.     # LLQ pain, very short term    Recommend:  - H pylori stool test  - Schedule colonoscopy w/ MAC sedation  - Prep: Split dose Colyte or equivalent    ** If MAC @ [facility]:    - NO alcohol, recreational drugs nor erectile dysfun

## 2020-10-06 ENCOUNTER — PATIENT MESSAGE (OUTPATIENT)
Dept: ORTHOPEDICS CLINIC | Facility: CLINIC | Age: 50
End: 2020-10-06

## 2020-10-06 ENCOUNTER — APPOINTMENT (OUTPATIENT)
Dept: LAB | Age: 50
End: 2020-10-06
Attending: INTERNAL MEDICINE
Payer: COMMERCIAL

## 2020-10-06 DIAGNOSIS — Z01.818 PREOP TESTING: ICD-10-CM

## 2020-10-07 ENCOUNTER — OFFICE VISIT (OUTPATIENT)
Dept: SURGERY | Facility: CLINIC | Age: 50
End: 2020-10-07
Payer: COMMERCIAL

## 2020-10-07 VITALS — HEIGHT: 64 IN | BODY MASS INDEX: 31.76 KG/M2 | WEIGHT: 186 LBS

## 2020-10-07 DIAGNOSIS — K64.4 ANAL SKIN TAG: ICD-10-CM

## 2020-10-07 DIAGNOSIS — K64.4 EXTERNAL HEMORRHOIDS WITH COMPLICATION: Primary | ICD-10-CM

## 2020-10-07 PROCEDURE — 46600 DIAGNOSTIC ANOSCOPY SPX: CPT | Performed by: SURGERY

## 2020-10-07 PROCEDURE — 3008F BODY MASS INDEX DOCD: CPT | Performed by: SURGERY

## 2020-10-07 PROCEDURE — 99244 OFF/OP CNSLTJ NEW/EST MOD 40: CPT | Performed by: SURGERY

## 2020-10-08 NOTE — H&P
History and Physical      Felicitas Moritz is a 48year old female. HPI   Patient presents with:  Hemorrhoids: Pt was referred by Dr. Angela Danielson for external hemorroids. Pt sts she has had it for years, gets swollen off and on, uses Prep H as needed and Number of children: 2      Years of education: Not on file      Highest education level: Not on file    Occupational History      Occupation: realtor    Tobacco Use      Smoking status: Never Smoker      Smokeless tobacco: Never Used      Tobacco commen no sig int hem. DIAGNOSTICS      ASSESSMENT/PLAN   Assessment   External hemorrhoids with complication  (primary encounter diagnosis)  Anal skin tag    48year old female with increasing symptomatic external hemorrhoid - skin tags.   Discussed in [address]

## 2020-10-08 NOTE — TELEPHONE ENCOUNTER
Please see Referral # [id number]. PA approved for MRI. Replied to patient on MyChart to let her know that PA has been approved.

## 2020-10-09 ENCOUNTER — HOSPITAL ENCOUNTER (OUTPATIENT)
Facility: HOSPITAL | Age: 50
Setting detail: HOSPITAL OUTPATIENT SURGERY
Discharge: HOME | End: 2020-10-09
Attending: INTERNAL MEDICINE | Admitting: INTERNAL MEDICINE
Payer: COMMERCIAL

## 2020-10-09 ENCOUNTER — ANESTHESIA EVENT (OUTPATIENT)
Dept: ENDOSCOPY | Facility: HOSPITAL | Age: 50
End: 2020-10-09
Payer: COMMERCIAL

## 2020-10-09 ENCOUNTER — ANESTHESIA (OUTPATIENT)
Dept: ENDOSCOPY | Facility: HOSPITAL | Age: 50
End: 2020-10-09
Payer: COMMERCIAL

## 2020-10-09 VITALS
SYSTOLIC BLOOD PRESSURE: 120 MMHG | HEART RATE: 70 BPM | RESPIRATION RATE: 16 BRPM | HEIGHT: 64 IN | OXYGEN SATURATION: 97 % | DIASTOLIC BLOOD PRESSURE: 78 MMHG | BODY MASS INDEX: 31.76 KG/M2 | WEIGHT: 186 LBS | TEMPERATURE: 98 F

## 2020-10-09 DIAGNOSIS — Z12.11 COLON CANCER SCREENING: ICD-10-CM

## 2020-10-09 DIAGNOSIS — Z01.818 PREOP TESTING: Primary | ICD-10-CM

## 2020-10-09 PROCEDURE — 0DBN8ZX EXCISION OF SIGMOID COLON, VIA NATURAL OR ARTIFICIAL OPENING ENDOSCOPIC, DIAGNOSTIC: ICD-10-PCS | Performed by: INTERNAL MEDICINE

## 2020-10-09 PROCEDURE — 45380 COLONOSCOPY AND BIOPSY: CPT | Performed by: INTERNAL MEDICINE

## 2020-10-09 RX ORDER — ONDANSETRON 2 MG/ML
4 INJECTION INTRAMUSCULAR; INTRAVENOUS ONCE
Status: COMPLETED | OUTPATIENT
Start: 2020-10-09 | End: 2020-10-09

## 2020-10-09 RX ORDER — SODIUM CHLORIDE, SODIUM LACTATE, POTASSIUM CHLORIDE, CALCIUM CHLORIDE 600; 310; 30; 20 MG/100ML; MG/100ML; MG/100ML; MG/100ML
INJECTION, SOLUTION INTRAVENOUS CONTINUOUS
Status: DISCONTINUED | OUTPATIENT
Start: 2020-10-09 | End: 2020-10-09

## 2020-10-09 RX ORDER — NALOXONE HYDROCHLORIDE 0.4 MG/ML
80 INJECTION, SOLUTION INTRAMUSCULAR; INTRAVENOUS; SUBCUTANEOUS AS NEEDED
Status: DISCONTINUED | OUTPATIENT
Start: 2020-10-09 | End: 2020-10-09

## 2020-10-09 RX ADMIN — SODIUM CHLORIDE, SODIUM LACTATE, POTASSIUM CHLORIDE, CALCIUM CHLORIDE: 600; 310; 30; 20 INJECTION, SOLUTION INTRAVENOUS at 10:25:00

## 2020-10-09 RX ADMIN — SODIUM CHLORIDE, SODIUM LACTATE, POTASSIUM CHLORIDE, CALCIUM CHLORIDE: 600; 310; 30; 20 INJECTION, SOLUTION INTRAVENOUS at 10:51:00

## 2020-10-09 NOTE — INTERVAL H&P NOTE
Pre-op Diagnosis: Colon cancer screening    The above referenced H&P was reviewed by Marcelle Watts MD on 10/9/2020, the patient was examined and no significant changes have occurred in the patient's condition since the H&P was performed.   I discussed with mann

## 2020-10-09 NOTE — ANESTHESIA PREPROCEDURE EVALUATION
Anesthesia PreOp Note    HPI:     Slick Gibbs is a 48year old female who presents for preoperative consultation requested by: Felecia Jarquin MD    Date of Surgery: 10/9/2020    Procedure(s):  COLONOSCOPY  Indication: Colon cancer screening    Andreea Diallo 10/7/2020 ), Disp: 1 Bottle, Rfl: 0    •  Diclofenac Sodium 75 MG Oral Tab EC, Take 1 tablet (75 mg total) by mouth 2 (two) times daily.  (Patient not taking: Reported on 10/2/2020 ), Disp: 30 tablet, Rfl: 1        •  lactated ringers infusion, , Intravenou Active member of club or organization: Not on file        Attends meetings of clubs or organizations: Not on file        Relationship status: Not on file      Intimate partner violence        Fear of current or ex partner: Not on file        Emotionally a Dental - normal exam     Pulmonary     breath sounds clear to auscultation  (+) sleep apnea on CPAP,   Cardiovascular - negative ROS  Exercise tolerance: good    ECG reviewed  Rhythm: irregular  Rate: normal    Neuro/Psych    (+)  anxiety/panic attacks,

## 2020-10-09 NOTE — ANESTHESIA POSTPROCEDURE EVALUATION
Patient: Elmo Quevedo    Procedure Summary     Date: 10/09/20 Room / Location: [address] [place] [address] ENDOSCOPY 04 / [address] [place] [address] ENDOSCOPY    Anesthesia Start: 1024 Anesthesia Stop: 7780    Procedure: COLONOSCOPY (N/A ) Diagnosis:       Colon cancer screening      (Fabienne Bowling

## 2020-10-09 NOTE — OPERATIVE REPORT
COLONOSCOPY REPORT    Joel Yost     1970 Age 48year old   PCP Cheo Bertrand DO Endoscopist Lou Gallagher MD     Date of procedure: 10/09/20    Procedure: Colonoscopy w/ cold biopsy polypectomy    Pre-operative diagnosis: screening    Post- mucosa throughout the colon showed normal vascular pattern, without evidence of angioectasias or inflammation. 6. JOSHUA: normal rectal tone, no masses palpated. Recommend:  · Await pathology.  The interval for the next colonoscopy will be determined

## 2020-10-12 ENCOUNTER — HOSPITAL ENCOUNTER (OUTPATIENT)
Dept: MRI IMAGING | Age: 50
Discharge: HOME | End: 2020-10-12
Attending: PHYSICIAN ASSISTANT
Payer: COMMERCIAL

## 2020-10-12 ENCOUNTER — TELEPHONE (OUTPATIENT)
Dept: SURGERY | Facility: CLINIC | Age: 50
End: 2020-10-12

## 2020-10-12 DIAGNOSIS — M23.91 INTERNAL DERANGEMENT OF RIGHT KNEE: ICD-10-CM

## 2020-10-12 PROCEDURE — 73721 MRI JNT OF LWR EXTRE W/O DYE: CPT | Performed by: PHYSICIAN ASSISTANT

## 2020-10-12 NOTE — TELEPHONE ENCOUNTER
Phone call to insurance co, spoke with Paul Reynaldo Ref # [id number]. Pre auth not required. For CPT code    53989.      ROBERTO

## 2020-10-17 ENCOUNTER — HOSPITAL ENCOUNTER (OUTPATIENT)
Dept: MAMMOGRAPHY | Age: 50
Discharge: HOME | End: 2020-10-17
Attending: FAMILY MEDICINE
Payer: COMMERCIAL

## 2020-10-17 DIAGNOSIS — Z12.31 VISIT FOR SCREENING MAMMOGRAM: ICD-10-CM

## 2020-10-17 PROCEDURE — 77067 SCR MAMMO BI INCL CAD: CPT | Performed by: FAMILY MEDICINE

## 2020-10-17 PROCEDURE — 77063 BREAST TOMOSYNTHESIS BI: CPT | Performed by: FAMILY MEDICINE

## 2020-10-19 ENCOUNTER — LAB REQUISITION (OUTPATIENT)
Dept: LAB | Facility: HOSPITAL | Age: 50
End: 2020-10-19
Payer: COMMERCIAL

## 2020-10-19 DIAGNOSIS — Z01.818 ENCOUNTER FOR OTHER PREPROCEDURAL EXAMINATION: ICD-10-CM

## 2020-10-22 ENCOUNTER — VIRTUAL CHECK-IN (OUTPATIENT)
Dept: SURGERY | Facility: CLINIC | Age: 50
End: 2020-10-22

## 2020-10-22 ENCOUNTER — LAB REQUISITION (OUTPATIENT)
Dept: LAB | Facility: HOSPITAL | Age: 50
End: 2020-10-22
Payer: COMMERCIAL

## 2020-10-22 DIAGNOSIS — K64.9 UNSPECIFIED HEMORRHOIDS: ICD-10-CM

## 2020-10-22 DIAGNOSIS — K64.4 EXTERNAL HEMORRHOIDS WITH COMPLICATION: Primary | ICD-10-CM

## 2020-10-22 PROCEDURE — 88304 TISSUE EXAM BY PATHOLOGIST: CPT | Performed by: SURGERY

## 2020-10-22 RX ORDER — HYDROCODONE BITARTRATE AND ACETAMINOPHEN 5; 325 MG/1; MG/1
1 TABLET ORAL EVERY 6 HOURS PRN
Qty: 16 TABLET | Refills: 0 | Status: SHIPPED | OUTPATIENT
Start: 2020-10-22 | End: 2021-01-19

## 2020-10-27 ENCOUNTER — OFFICE VISIT (OUTPATIENT)
Dept: ORTHOPEDICS CLINIC | Facility: CLINIC | Age: 50
End: 2020-10-27
Payer: COMMERCIAL

## 2020-10-27 ENCOUNTER — TELEPHONE (OUTPATIENT)
Dept: GASTROENTEROLOGY | Facility: CLINIC | Age: 50
End: 2020-10-27

## 2020-10-27 VITALS — DIASTOLIC BLOOD PRESSURE: 82 MMHG | SYSTOLIC BLOOD PRESSURE: 115 MMHG | RESPIRATION RATE: 15 BRPM | HEART RATE: 103 BPM

## 2020-10-27 DIAGNOSIS — M23.91 INTERNAL DERANGEMENT OF RIGHT KNEE: ICD-10-CM

## 2020-10-27 DIAGNOSIS — M94.261 CHONDROMALACIA, RIGHT KNEE: ICD-10-CM

## 2020-10-27 DIAGNOSIS — S83.281D ACUTE LATERAL MENISCUS TEAR OF RIGHT KNEE, SUBSEQUENT ENCOUNTER: Primary | ICD-10-CM

## 2020-10-27 PROCEDURE — 3074F SYST BP LT 130 MM HG: CPT | Performed by: ORTHOPAEDIC SURGERY

## 2020-10-27 PROCEDURE — 3079F DIAST BP 80-89 MM HG: CPT | Performed by: ORTHOPAEDIC SURGERY

## 2020-10-27 PROCEDURE — 99213 OFFICE O/P EST LOW 20 MIN: CPT | Performed by: ORTHOPAEDIC SURGERY

## 2020-10-27 PROCEDURE — 20610 DRAIN/INJ JOINT/BURSA W/O US: CPT | Performed by: ORTHOPAEDIC SURGERY

## 2020-10-27 RX ORDER — TRIAMCINOLONE ACETONIDE 40 MG/ML
40 INJECTION, SUSPENSION INTRA-ARTICULAR; INTRAMUSCULAR ONCE
Status: COMPLETED | OUTPATIENT
Start: 2020-10-27 | End: 2020-10-27

## 2020-10-27 RX ADMIN — TRIAMCINOLONE ACETONIDE 40 MG: 40 INJECTION, SUSPENSION INTRA-ARTICULAR; INTRAMUSCULAR at 14:55:00

## 2020-10-27 NOTE — PROGRESS NOTES
Per verbal order from Dr. Maxime Taveras, draw up 5ml of 0.5% Marcaine and 1ml of Kenalog 40 for cortisone injection to right knee. Edward Ferrari RN  Patient provided education handout for cortisone injection.    Patient left office prior to obtaining post injectio

## 2020-10-27 NOTE — TELEPHONE ENCOUNTER
Health maintenance updated. 10 year colonoscopy recall placed in patient outreach. Due on 10/09/2030 per Dr. Yana Whitaker.

## 2020-10-27 NOTE — PROGRESS NOTES
NURSING INTAKE COMMENTS: Patient presents with: Follow - Up: Review MRI of Right Knee - Patient is very careful with knee. - Knee Pain - 3/10 on Pain killers from [facility]. HPI: This 48year old female presents today for persistent right knee pain. Hypertension Father    • Other (gout) Father    • Other (arthritis) Father    • Cancer Mother    • Diabetes Mother    • Hypertension Mother    • Other (arthritis) Mother    • Other (psoriasis) Son    • Diabetes Brother      No family Hx of DVT/PE    Social and medial joint line pain. Neurological: Normal motor and sensory right lower extremity. Imaging: MRI films report were shown to her. I agree there is a posterior lateral meniscus tear but she also has an anterior lateral meniscus tear.   The posterio osteophyte formation. MUSCLES: No edema or fatty atrophy is appreciated. EFFUSION: Small effusion. OTHER: Negative. CONCLUSION:   Complex type tear posterior horn and and root of the lateral meniscus. Small effusion.   Tricompartmental osteoarthrit PLEASE NOTE: NORMAL MAMMOGRAM DOES NOT EXCLUDE THE POSSIBILITY OF BREAST CANCER. A CLINICALLY SUSPICIOUS PALPABLE LUMP SHOULD BE BIOPSIED.    For patients over the age of 36, the target due date for the patient's next mammogram has been entered into a veronica 0.5% 5 cc and Kenalog 1 cc well. She will do the therapy and home excise program.  If she is not improving, she should return to the office to discuss knee arthroscopy. Follow Up: No follow-ups on file.     Gonsalo Tong MD

## 2020-10-27 NOTE — TELEPHONE ENCOUNTER
Chon Street MD  [facility] Clinical Staff             GI staff: please place recall in for colonoscopy in 10 years

## 2020-11-03 ENCOUNTER — OFFICE VISIT (OUTPATIENT)
Dept: OBGYN CLINIC | Facility: CLINIC | Age: 50
End: 2020-11-03
Payer: COMMERCIAL

## 2020-11-03 VITALS
BODY MASS INDEX: 32 KG/M2 | SYSTOLIC BLOOD PRESSURE: 115 MMHG | DIASTOLIC BLOOD PRESSURE: 90 MMHG | HEART RATE: 73 BPM | WEIGHT: 189 LBS

## 2020-11-03 DIAGNOSIS — N85.2 ENLARGED UTERUS: ICD-10-CM

## 2020-11-03 DIAGNOSIS — Z01.411 ENCOUNTER FOR GYNECOLOGICAL EXAMINATION WITH ABNORMAL FINDING: Primary | ICD-10-CM

## 2020-11-03 DIAGNOSIS — L91.8 CUTANEOUS SKIN TAGS: ICD-10-CM

## 2020-11-03 PROBLEM — Z78.9: Status: RESOLVED | Noted: 2017-02-08 | Resolved: 2020-11-03

## 2020-11-03 PROBLEM — N89.8 VAGINAL DISCHARGE: Status: RESOLVED | Noted: 2017-02-08 | Resolved: 2020-11-03

## 2020-11-03 PROCEDURE — 99072 ADDL SUPL MATRL&STAF TM PHE: CPT | Performed by: OBSTETRICS & GYNECOLOGY

## 2020-11-03 PROCEDURE — 99396 PREV VISIT EST AGE 40-64: CPT | Performed by: OBSTETRICS & GYNECOLOGY

## 2020-11-03 PROCEDURE — 3074F SYST BP LT 130 MM HG: CPT | Performed by: OBSTETRICS & GYNECOLOGY

## 2020-11-03 PROCEDURE — 3080F DIAST BP >= 90 MM HG: CPT | Performed by: OBSTETRICS & GYNECOLOGY

## 2020-11-04 NOTE — PROGRESS NOTES
Destiny Nolen is a 48year old female  Patient's last menstrual period was 10/14/2020. Patient presents with:  Gyn Exam: annual....want skin tags removed -- had hemorrhoid surgery 2 wks ago. Skin tags appeared w/o warning & bothersome.  Last see Smoking status: Never Smoker      Smokeless tobacco: Never Used      Tobacco comment: No Household Smokers. Substance and Sexual Activity      Alcohol use:  Yes        Alcohol/week: 0.0 standard drinks        Comment: occ      Drug use: No      Sexual a Cancer Mother    • Diabetes Mother    • Hypertension Mother    • Other (arthritis) Mother    • Other (psoriasis) Son    • Diabetes Brother        MEDICATIONS:    Current Outpatient Medications:   •  HYDROcodone-acetaminophen 5-325 MG Oral Tab, Take 1 table supraclavicular or axillary adenopathy is noted  Breast:   normal without palpable masses, tenderness, asymmetry, nipple discharge, nipple retraction or skin changes  Abdomen:   soft, nontender, nondistended, no masses  Skin/Hair:  no unusual rashes or bru

## 2020-11-05 ENCOUNTER — TELEPHONE (OUTPATIENT)
Dept: GASTROENTEROLOGY | Facility: CLINIC | Age: 50
End: 2020-11-05

## 2020-11-09 ENCOUNTER — OFFICE VISIT (OUTPATIENT)
Dept: SURGERY | Facility: CLINIC | Age: 50
End: 2020-11-09
Payer: COMMERCIAL

## 2020-11-09 DIAGNOSIS — K64.4 ANAL SKIN TAG: ICD-10-CM

## 2020-11-09 DIAGNOSIS — K64.4 EXTERNAL HEMORRHOIDS WITH COMPLICATION: Primary | ICD-10-CM

## 2020-11-09 PROCEDURE — 99024 POSTOP FOLLOW-UP VISIT: CPT | Performed by: SURGERY

## 2020-11-09 NOTE — PROGRESS NOTES
Postoperative Patient Follow-up      11/9/2020    Destiny Nolen 48year old      HPI  Patient presents with:  Post-Op: Pt here for 1st post op s/p hemorhoidectomy & exc. of perianal skin tag on 10/22/2020.   Pt  states pain has subsided and she is no l

## 2020-11-10 ENCOUNTER — OFFICE VISIT (OUTPATIENT)
Dept: PHYSICAL THERAPY | Age: 50
End: 2020-11-10
Attending: ORTHOPAEDIC SURGERY
Payer: COMMERCIAL

## 2020-11-10 DIAGNOSIS — S83.281D ACUTE LATERAL MENISCUS TEAR OF RIGHT KNEE, SUBSEQUENT ENCOUNTER: ICD-10-CM

## 2020-11-10 DIAGNOSIS — M23.91 INTERNAL DERANGEMENT OF RIGHT KNEE: ICD-10-CM

## 2020-11-10 DIAGNOSIS — M94.261 CHONDROMALACIA, RIGHT KNEE: ICD-10-CM

## 2020-11-10 PROCEDURE — 97110 THERAPEUTIC EXERCISES: CPT | Performed by: PHYSICAL THERAPIST

## 2020-11-10 PROCEDURE — 97161 PT EVAL LOW COMPLEX 20 MIN: CPT | Performed by: PHYSICAL THERAPIST

## 2020-11-10 NOTE — PROGRESS NOTES
KNEE EVALUATION:   Referring Physician: Dr. Erick Goodman  Diagnosis: Internal derangement of right knee (M23.91)  Acute lateral meniscus tear of right knee, subsequent encounter (T46.045M)  Chondromalacia, right knee (F90.041)      Onset Date: July 2020 An [place] ASSESSMENT:   Regine Laughlin presents to physical therapy evaluation with primary c/o R knee pain.  The results of the objective tests and measures show decreased R knee AROM, decreased R  LE strength, antalgic gait , step too gait on stairs and intermitten gait and terminal knee extension in stance  2.  Pt will improve knee AROM flexion to150 degrees without pain to improve ability to perform donning/doffing shoes/socks  3 Pt will improve quad strength to 5/5 to ascend 1 flight of stairs reciprocally without

## 2020-11-13 ENCOUNTER — APPOINTMENT (OUTPATIENT)
Dept: PHYSICAL THERAPY | Age: 50
End: 2020-11-13
Attending: ORTHOPAEDIC SURGERY
Payer: COMMERCIAL

## 2020-11-16 ENCOUNTER — OFFICE VISIT (OUTPATIENT)
Dept: PHYSICAL THERAPY | Age: 50
End: 2020-11-16
Attending: ORTHOPAEDIC SURGERY
Payer: COMMERCIAL

## 2020-11-16 DIAGNOSIS — M23.91 INTERNAL DERANGEMENT OF RIGHT KNEE: ICD-10-CM

## 2020-11-16 DIAGNOSIS — M94.261 CHONDROMALACIA, RIGHT KNEE: ICD-10-CM

## 2020-11-16 DIAGNOSIS — S83.281D ACUTE LATERAL MENISCUS TEAR OF RIGHT KNEE, SUBSEQUENT ENCOUNTER: ICD-10-CM

## 2020-11-16 PROCEDURE — 97110 THERAPEUTIC EXERCISES: CPT | Performed by: PHYSICAL THERAPIST

## 2020-11-16 NOTE — PROGRESS NOTES
Dx: Internal derangement of right knee (M23.91)  Acute lateral meniscus tear of right knee, subsequent encounter (S83.281D)  Chondromalacia, right knee (M94.261)            Insurance (Authorized # of Visits):  10 PPO           Authorizing Physician: Dr. Camp handout)    Charges: 3 therex       Total Timed Treatment: 39 min  Total Treatment Time: 39 min

## 2020-11-18 ENCOUNTER — OFFICE VISIT (OUTPATIENT)
Dept: PHYSICAL THERAPY | Age: 50
End: 2020-11-18
Attending: ORTHOPAEDIC SURGERY
Payer: COMMERCIAL

## 2020-11-18 DIAGNOSIS — M94.261 CHONDROMALACIA, RIGHT KNEE: ICD-10-CM

## 2020-11-18 DIAGNOSIS — M23.91 INTERNAL DERANGEMENT OF RIGHT KNEE: ICD-10-CM

## 2020-11-18 DIAGNOSIS — S83.281D ACUTE LATERAL MENISCUS TEAR OF RIGHT KNEE, SUBSEQUENT ENCOUNTER: ICD-10-CM

## 2020-11-18 PROCEDURE — 97110 THERAPEUTIC EXERCISES: CPT | Performed by: PHYSICAL THERAPIST

## 2020-11-18 NOTE — PROGRESS NOTES
Dx: Internal derangement of right knee (M23.91)  Acute lateral meniscus tear of right knee, subsequent encounter (S83.281D)  Chondromalacia, right knee (M94.261)            Insurance (Authorized # of Visits):  10 PPO           Authorizing Physician: Dr. Camp B 6 cords x20, R LE 4 cords x20  LAQ 2x15  QS 5sec x20  SLR 2x10  SLR c ER 2x7  Sidelying hip abd 2x10  Sidelying clams x20  Prone hip ext 2x10  Prone knee flexion x20 FOTO                             HEP: 11/18/20 QS, SLR, SLR c ER, hip abd, clams, hip ex

## 2020-11-23 ENCOUNTER — NURSE TRIAGE (OUTPATIENT)
Dept: FAMILY MEDICINE CLINIC | Facility: CLINIC | Age: 50
End: 2020-11-23

## 2020-11-23 DIAGNOSIS — Z20.822 EXPOSURE TO COVID-19 VIRUS: Primary | ICD-10-CM

## 2020-11-23 NOTE — TELEPHONE ENCOUNTER
Action Requested: Summary for Provider     []  Critical Lab, Recommendations Needed  [] Need Additional Advice  []   FYI    []   Need Orders  [] Need Medications Sent to Pharmacy  []  Other     SUMMARY: Pt requesting test- exposed 2 weeks ago in her car bu

## 2020-11-24 ENCOUNTER — APPOINTMENT (OUTPATIENT)
Dept: PHYSICAL THERAPY | Age: 50
End: 2020-11-24
Attending: ORTHOPAEDIC SURGERY
Payer: COMMERCIAL

## 2020-11-24 ENCOUNTER — APPOINTMENT (OUTPATIENT)
Dept: LAB | Facility: HOSPITAL | Age: 50
End: 2020-11-24
Attending: NURSE PRACTITIONER
Payer: COMMERCIAL

## 2020-11-24 ENCOUNTER — TELEPHONE (OUTPATIENT)
Dept: PHYSICAL THERAPY | Age: 50
End: 2020-11-24

## 2020-11-24 DIAGNOSIS — Z20.822 EXPOSURE TO COVID-19 VIRUS: ICD-10-CM

## 2020-11-27 ENCOUNTER — OFFICE VISIT (OUTPATIENT)
Dept: PHYSICAL THERAPY | Age: 50
End: 2020-11-27
Attending: ORTHOPAEDIC SURGERY
Payer: COMMERCIAL

## 2020-11-27 DIAGNOSIS — M94.261 CHONDROMALACIA, RIGHT KNEE: ICD-10-CM

## 2020-11-27 DIAGNOSIS — S83.281D ACUTE LATERAL MENISCUS TEAR OF RIGHT KNEE, SUBSEQUENT ENCOUNTER: ICD-10-CM

## 2020-11-27 DIAGNOSIS — M23.91 INTERNAL DERANGEMENT OF RIGHT KNEE: ICD-10-CM

## 2020-11-27 PROCEDURE — 97110 THERAPEUTIC EXERCISES: CPT | Performed by: PHYSICAL THERAPIST

## 2020-11-27 NOTE — PROGRESS NOTES
Dx: Internal derangement of right knee (M23.91)  Acute lateral meniscus tear of right knee, subsequent encounter (S83.281D)  Chondromalacia, right knee (M94.261)            Insurance (Authorized # of Visits):  10 PPO           Authorizing Physician: Dr. Camp flexion x20 Nustep L4 LE x 7min  Gastroc stretch 30sec x3  Hamstring stretch 30sec x3  Standing heel raises x20  Standing hip flex/abd/ext 2x10 R/L  Step ups fwd/lat 6 inch x20  Shuttle B 6 cords x20, R LE 4 cords x20  LAQ 2x15  QS 5sec x20  SLR 2x10  SLR

## 2020-12-01 ENCOUNTER — APPOINTMENT (OUTPATIENT)
Dept: PHYSICAL THERAPY | Age: 50
End: 2020-12-01
Attending: ORTHOPAEDIC SURGERY
Payer: COMMERCIAL

## 2020-12-01 ENCOUNTER — TELEPHONE (OUTPATIENT)
Dept: PHYSICAL THERAPY | Age: 50
End: 2020-12-01

## 2020-12-02 ENCOUNTER — OFFICE VISIT (OUTPATIENT)
Dept: PHYSICAL THERAPY | Age: 50
End: 2020-12-02
Attending: ORTHOPAEDIC SURGERY
Payer: COMMERCIAL

## 2020-12-02 PROCEDURE — 97110 THERAPEUTIC EXERCISES: CPT | Performed by: PHYSICAL THERAPIST

## 2020-12-02 NOTE — PROGRESS NOTES
Dx: Internal derangement of right knee (M23.91)  Acute lateral meniscus tear of right knee, subsequent encounter (S83.281D)  Chondromalacia, right knee (M94.261)            Insurance (Authorized # of Visits):  10 PPO           Authorizing Physician: Dr. Camp 2x10  Prone knee flexion x20 Nustep L4 LE x 7min  Gastroc stretch 30sec x3  Hamstring stretch 30sec x3  Standing heel raises x20  Standing hip flex/abd/ext 2x10 R/L  Step ups fwd/lat 6 inch x20  Shuttle B 6 cords x20, R LE 4 cords x20  LAQ 2x15  QS 5sec x2

## 2020-12-03 ENCOUNTER — APPOINTMENT (OUTPATIENT)
Dept: PHYSICAL THERAPY | Age: 50
End: 2020-12-03
Attending: ORTHOPAEDIC SURGERY
Payer: COMMERCIAL

## 2020-12-04 ENCOUNTER — OFFICE VISIT (OUTPATIENT)
Dept: PHYSICAL THERAPY | Age: 50
End: 2020-12-04
Attending: FAMILY MEDICINE
Payer: COMMERCIAL

## 2020-12-04 PROCEDURE — 97110 THERAPEUTIC EXERCISES: CPT | Performed by: PHYSICAL THERAPIST

## 2020-12-04 NOTE — PROGRESS NOTES
Dx: Internal derangement of right knee (M23.91)  Acute lateral meniscus tear of right knee, subsequent encounter (S83.281D)  Chondromalacia, right knee (M94.261)            Insurance (Authorized # of Visits):  10 PPO           Authorizing Physician: Dr. Juan Pablo Linton flex/abd/ext 2x10 R/L  Step ups fwd/lat 6 inch x20  Shuttle B 6 cords x20, R LE 4 cords x20  LAQ 2x15  QS 5sec x20  SLR 2x10  SLR c ER 2x7  Sidelying hip abd 2x10  Sidelying clams x20  Prone hip ext 2x10  Prone knee flexion x20 Gastroc stretch 30sec x3  Melody Burnett

## 2020-12-07 ENCOUNTER — TELEPHONE (OUTPATIENT)
Dept: GASTROENTEROLOGY | Facility: CLINIC | Age: 50
End: 2020-12-07

## 2020-12-08 ENCOUNTER — OFFICE VISIT (OUTPATIENT)
Dept: PHYSICAL THERAPY | Age: 50
End: 2020-12-08
Attending: ORTHOPAEDIC SURGERY
Payer: COMMERCIAL

## 2020-12-08 PROCEDURE — 97110 THERAPEUTIC EXERCISES: CPT | Performed by: PHYSICAL THERAPIST

## 2020-12-08 NOTE — PROGRESS NOTES
Dx: Internal derangement of right knee (M23.91)  Acute lateral meniscus tear of right knee, subsequent encounter (S83.281D)  Chondromalacia, right knee (M94.261)            Insurance (Authorized # of Visits):  10 PPO           Authorizing Physician: Dr. Cherise Joshua 3-way  11/18/20 QS, SLR, SLR c ER, hip abd, clams, hip ext (see handout)  11/10/20  LAQ (see handout)    Charges: 2 therex       Total Timed Treatment: 32 min  Total Treatment Time: 32 min

## 2020-12-10 ENCOUNTER — VIRTUAL PHONE E/M (OUTPATIENT)
Dept: FAMILY MEDICINE CLINIC | Facility: CLINIC | Age: 50
End: 2020-12-10
Payer: COMMERCIAL

## 2020-12-10 ENCOUNTER — LAB ENCOUNTER (OUTPATIENT)
Dept: LAB | Age: 50
End: 2020-12-10
Attending: PHYSICIAN ASSISTANT
Payer: COMMERCIAL

## 2020-12-10 DIAGNOSIS — R19.7 DIARRHEA, UNSPECIFIED TYPE: ICD-10-CM

## 2020-12-10 DIAGNOSIS — R53.83 FATIGUE, UNSPECIFIED TYPE: Primary | ICD-10-CM

## 2020-12-10 PROCEDURE — 99213 OFFICE O/P EST LOW 20 MIN: CPT | Performed by: PHYSICIAN ASSISTANT

## 2020-12-10 PROCEDURE — 85025 COMPLETE CBC W/AUTO DIFF WBC: CPT | Performed by: PHYSICIAN ASSISTANT

## 2020-12-10 PROCEDURE — 82607 VITAMIN B-12: CPT | Performed by: PHYSICIAN ASSISTANT

## 2020-12-10 PROCEDURE — 36415 COLL VENOUS BLD VENIPUNCTURE: CPT | Performed by: PHYSICIAN ASSISTANT

## 2020-12-10 PROCEDURE — 82306 VITAMIN D 25 HYDROXY: CPT | Performed by: PHYSICIAN ASSISTANT

## 2020-12-10 PROCEDURE — 84443 ASSAY THYROID STIM HORMONE: CPT | Performed by: PHYSICIAN ASSISTANT

## 2020-12-11 ENCOUNTER — LAB ENCOUNTER (OUTPATIENT)
Dept: LAB | Age: 50
End: 2020-12-11
Attending: PHYSICIAN ASSISTANT
Payer: COMMERCIAL

## 2020-12-11 ENCOUNTER — OFFICE VISIT (OUTPATIENT)
Dept: OBGYN CLINIC | Facility: CLINIC | Age: 50
End: 2020-12-11
Payer: COMMERCIAL

## 2020-12-11 ENCOUNTER — OFFICE VISIT (OUTPATIENT)
Dept: PHYSICAL THERAPY | Age: 50
End: 2020-12-11
Attending: FAMILY MEDICINE
Payer: COMMERCIAL

## 2020-12-11 VITALS
HEART RATE: 66 BPM | DIASTOLIC BLOOD PRESSURE: 85 MMHG | BODY MASS INDEX: 33 KG/M2 | SYSTOLIC BLOOD PRESSURE: 123 MMHG | WEIGHT: 192 LBS

## 2020-12-11 DIAGNOSIS — N90.89 VULVAR SKIN TAG: Primary | ICD-10-CM

## 2020-12-11 PROCEDURE — 87272 CRYPTOSPORIDIUM AG IF: CPT

## 2020-12-11 PROCEDURE — 87329 GIARDIA AG IA: CPT

## 2020-12-11 PROCEDURE — 11200 RMVL SKIN TAGS UP TO&INC 15: CPT | Performed by: OBSTETRICS & GYNECOLOGY

## 2020-12-11 PROCEDURE — 3074F SYST BP LT 130 MM HG: CPT | Performed by: OBSTETRICS & GYNECOLOGY

## 2020-12-11 PROCEDURE — 3079F DIAST BP 80-89 MM HG: CPT | Performed by: OBSTETRICS & GYNECOLOGY

## 2020-12-11 PROCEDURE — 97110 THERAPEUTIC EXERCISES: CPT | Performed by: PHYSICAL THERAPIST

## 2020-12-11 NOTE — PROGRESS NOTES
Dx: Internal derangement of right knee (M23.91)  Acute lateral meniscus tear of right knee, subsequent encounter (S83.281D)  Chondromalacia, right knee (M94.261)            Insurance (Authorized # of Visits):  10 PPO           Authorizing Physician: Dr. Trina Abraham raises x20  Standing hip YTB flex/abd 2x10 R/L  Step ups fwd/lat 6 inch x20  Step downs 6 inch x20              Neuro                          HEP: 12/8/20 YTB standing hip 3-way  11/18/20 QS, SLR, SLR c ER, hip abd, clams, hip ext (see handout)  11/10/20

## 2020-12-12 NOTE — PROGRESS NOTES
HPI: HPI    I spoke to Sarita Carmel by phone, verified date of birth, and discussed current concerns. Patient complaints of feeling fatigue for the past 2 months, feels tired, no energy, diarrhea on and off.    Patient denies of fever, N/V, abdom Surgical History:   Procedure Laterality Date   • Colonoscopy  01/20/2011   • Colonoscopy N/A 10/9/2020    Procedure: COLONOSCOPY;  Surgeon: Tang Mensah MD;  Location: [facility] ENDOSCOPY   • Egd  01/20/2011   • Hand/finger surgery unlisted Right 1990    littl on file      Intimate partner violence        Fear of current or ex partner: Not on file        Emotionally abused: Not on file        Physically abused: Not on file        Forced sexual activity: Not on file    Other Topics      Concerns:         Items Addressed This Visit     None      Visit Diagnoses     Fatigue, unspecified type    -  Primary    Relevant Orders    VITAMIN B12 (Completed)    VITAMIN D, 25-HYDROXY (Completed)    TSH W REFLEX TO FREE T4 (Completed)    CBC WITH DIFFERENTIAL WITH KIYA

## 2020-12-14 ENCOUNTER — OFFICE VISIT (OUTPATIENT)
Dept: PHYSICAL THERAPY | Age: 50
End: 2020-12-14
Attending: FAMILY MEDICINE
Payer: COMMERCIAL

## 2020-12-14 PROCEDURE — 97110 THERAPEUTIC EXERCISES: CPT | Performed by: PHYSICAL THERAPIST

## 2020-12-14 NOTE — PROGRESS NOTES
Dx: Internal derangement of right knee (M23.91)  Acute lateral meniscus tear of right knee, subsequent encounter (S83.281D)  Chondromalacia, right knee (M94.261)            Insurance (Authorized # of Visits):  10 PPO           Authorizing Physician: Dr. Lyle Bullock 2x10 R/L  Step ups fwd/lat 6 inch x20  Step downs 6 inch x20         Nustep L5 LE x 7min   Gastroc stretch 30sec x3  Hamstring stretch 30sec x3  Standing heel raises x20  Standing hip YTB flex/abd 2x10 R/L  YTB sidestep/monster walk 20ft x2  Walking lunge

## 2020-12-16 NOTE — PROCEDURES
Vulvar Skin Tag removal    Birth control method(s) used:      Consent signed. Procedure discussed with patient in detail including indication, risk, benefits, alternatives and complications.     Lesion Description: multiple skin tags -- one 1/2 cm, others

## 2020-12-23 ENCOUNTER — TELEPHONE (OUTPATIENT)
Dept: FAMILY MEDICINE CLINIC | Facility: CLINIC | Age: 50
End: 2020-12-23

## 2021-01-11 ENCOUNTER — NURSE ONLY (OUTPATIENT)
Dept: FAMILY MEDICINE CLINIC | Facility: CLINIC | Age: 51
End: 2021-01-11
Payer: COMMERCIAL

## 2021-01-11 ENCOUNTER — OFFICE VISIT (OUTPATIENT)
Dept: DERMATOLOGY CLINIC | Facility: CLINIC | Age: 51
End: 2021-01-11
Payer: COMMERCIAL

## 2021-01-11 DIAGNOSIS — D23.60 BENIGN NEOPLASM OF SKIN OF UPPER LIMB, INCLUDING SHOULDER, UNSPECIFIED LATERALITY: ICD-10-CM

## 2021-01-11 DIAGNOSIS — D23.4 BENIGN NEOPLASM OF SCALP AND SKIN OF NECK: ICD-10-CM

## 2021-01-11 DIAGNOSIS — D48.5 NEOPLASM OF UNCERTAIN BEHAVIOR OF SKIN: Primary | ICD-10-CM

## 2021-01-11 DIAGNOSIS — Z23 NEED FOR SHINGLES VACCINE: Primary | ICD-10-CM

## 2021-01-11 DIAGNOSIS — D22.9 MULTIPLE NEVI: ICD-10-CM

## 2021-01-11 DIAGNOSIS — D23.30 BENIGN NEOPLASM OF SKIN OF FACE: ICD-10-CM

## 2021-01-11 DIAGNOSIS — D23.70 BENIGN NEOPLASM OF SKIN OF LOWER LIMB, INCLUDING HIP, UNSPECIFIED LATERALITY: ICD-10-CM

## 2021-01-11 DIAGNOSIS — D23.5 BENIGN NEOPLASM OF SKIN OF TRUNK, EXCEPT SCROTUM: ICD-10-CM

## 2021-01-11 PROCEDURE — 90750 HZV VACC RECOMBINANT IM: CPT | Performed by: FAMILY MEDICINE

## 2021-01-11 PROCEDURE — 99203 OFFICE O/P NEW LOW 30 MIN: CPT | Performed by: DERMATOLOGY

## 2021-01-11 PROCEDURE — 11103 TANGNTL BX SKIN EA SEP/ADDL: CPT | Performed by: DERMATOLOGY

## 2021-01-11 PROCEDURE — 90471 IMMUNIZATION ADMIN: CPT | Performed by: FAMILY MEDICINE

## 2021-01-11 PROCEDURE — 11102 TANGNTL BX SKIN SINGLE LES: CPT | Performed by: DERMATOLOGY

## 2021-01-11 PROCEDURE — 88305 TISSUE EXAM BY PATHOLOGIST: CPT | Performed by: DERMATOLOGY

## 2021-01-11 RX ORDER — NEOMYCIN SULFATE, POLYMYXIN B SULFATE, BACITRACIN ZINC, HYDROCORTISONE 3.5; 10000; 400; 1 MG/G; [USP'U]/G; [USP'U]/G; MG/G
OINTMENT OPHTHALMIC
Qty: 3.5 G | Refills: 3 | Status: SHIPPED | OUTPATIENT
Start: 2021-01-11 | End: 2021-11-04

## 2021-01-11 RX ORDER — OLOPATADINE HYDROCHLORIDE 1 MG/ML
SOLUTION/ DROPS OPHTHALMIC
Qty: 5 ML | Refills: 11 | Status: SHIPPED | OUTPATIENT
Start: 2021-01-11 | End: 2021-11-04

## 2021-01-11 NOTE — PROGRESS NOTES
Patient is here for second Shingrix vaccine. Patients full name and  verified. Patient tolerated vaccine well.

## 2021-01-14 NOTE — PROGRESS NOTES
Mallory  Pt has attended 9 visits in Physical Therapy  Reporting period: 11/10/2020 to 12/14/2020    Assessment: Pt did not schedule anymore appts and said she was going to wait until she got new insurance. Pt has not called to schedule.  Pt is t

## 2021-01-15 NOTE — PROGRESS NOTES
The pathology report from last visit showed left posterior shoulder andleft preauricular benign   Compound nevi . Please log in test results, send biopsy results letter. Pt to rtc 1 year or prn.

## 2021-01-18 ENCOUNTER — NURSE TRIAGE (OUTPATIENT)
Dept: FAMILY MEDICINE CLINIC | Facility: CLINIC | Age: 51
End: 2021-01-18

## 2021-01-18 NOTE — PROGRESS NOTES
Yolette Centeno is a 48year old female. HPI:     CC:  Patient presents with:  Upper Body Exam: LOV 12/9/16. pt presenting today with upper body skin check. Denies family and personal HX of skin cancer.         Allergies:  Dust Mites; Cats Claw, Uncaria Oral Tab EC Take 1 tablet (75 mg total) by mouth 2 (two) times daily. 30 tablet 1   • Multiple Vitamin (MULTI-VITAMINS) Oral Tab Take  by mouth.  take 1 tablet by oral route  every day with food     • HYDROcodone-acetaminophen 5-325 MG Oral Tab Take 1 table Partners: Male    Lifestyle      Physical activity        Days per week: Not on file        Minutes per session: Not on file      Stress: Not on file    Relationships      Social connections        Talks on phone: Not on file        Gets together: Not on f check. Denies family and personal HX of skin cancer. Past notes/ records and appropriate/relevant lab results including pathology and past body maps reviewed. Updated and new information noted in current visit. Patient concern with changing lesions. 3     Sig: Use on irritated r eyelids bid   • Olopatadine HCl 0.1 % Ophthalmic Solution 5 mL 11     Si drop on each eyelid bid   • Tretinoin 0.05 % External Cream 20 g 3     Sig: Apply to the entire face, chest and back as directed at bedtime. more aggressive therapy if not responding. Eyelid dermatitis mild eczematous changes bilaterally more prominent edema lower lids, upper lids left greater than right. Likely related to seasonal allergies.   Cortisporin ointment, Patanol drops on eyelids

## 2021-01-18 NOTE — PROGRESS NOTES
Operative Report                     Shave/  Tangential biopsy     Clinical diagnosis:    Size of lesion:    Location:Diagnosis/Clinical History: pt with changing irritated nevi  Spec 1 Description >>>>>: left posterior shoulder  Spec 1 Comment: irri

## 2021-01-19 ENCOUNTER — LAB ENCOUNTER (OUTPATIENT)
Dept: LAB | Age: 51
End: 2021-01-19
Attending: NURSE PRACTITIONER
Payer: COMMERCIAL

## 2021-01-19 ENCOUNTER — OFFICE VISIT (OUTPATIENT)
Dept: FAMILY MEDICINE CLINIC | Facility: CLINIC | Age: 51
End: 2021-01-19
Payer: COMMERCIAL

## 2021-01-19 VITALS
HEART RATE: 77 BPM | BODY MASS INDEX: 33 KG/M2 | WEIGHT: 191 LBS | DIASTOLIC BLOOD PRESSURE: 103 MMHG | SYSTOLIC BLOOD PRESSURE: 150 MMHG

## 2021-01-19 DIAGNOSIS — H15.9 DISORDER OF SCLERA: Primary | ICD-10-CM

## 2021-01-19 LAB
ALBUMIN SERPL-MCNC: 3.4 G/DL (ref 3.4–5)
ALBUMIN/GLOB SERPL: 0.8 {RATIO} (ref 1–2)
ALP LIVER SERPL-CCNC: 74 U/L
ALT SERPL-CCNC: 22 U/L
ANION GAP SERPL CALC-SCNC: 5 MMOL/L (ref 0–18)
AST SERPL-CCNC: 15 U/L (ref 15–37)
BILIRUB DIRECT SERPL-MCNC: 0.1 MG/DL (ref 0–0.2)
BILIRUB SERPL-MCNC: 0.4 MG/DL (ref 0.1–2)
BUN BLD-MCNC: 9 MG/DL (ref 7–18)
BUN/CREAT SERPL: 12.9 (ref 10–20)
CALCIUM BLD-MCNC: 9 MG/DL (ref 8.5–10.1)
CHLORIDE SERPL-SCNC: 106 MMOL/L (ref 98–112)
CO2 SERPL-SCNC: 27 MMOL/L (ref 21–32)
CREAT BLD-MCNC: 0.7 MG/DL
DEPRECATED RDW RBC AUTO: 45.7 FL (ref 35.1–46.3)
ERYTHROCYTE [DISTWIDTH] IN BLOOD BY AUTOMATED COUNT: 13 % (ref 11–15)
GLOBULIN PLAS-MCNC: 4.2 G/DL (ref 2.8–4.4)
GLUCOSE BLD-MCNC: 92 MG/DL (ref 70–99)
HCT VFR BLD AUTO: 36.8 %
HGB BLD-MCNC: 11.8 G/DL
INR BLD: 0.99 (ref 0.9–1.2)
M PROTEIN MFR SERPL ELPH: 7.6 G/DL (ref 6.4–8.2)
MCH RBC QN AUTO: 30.6 PG (ref 26–34)
MCHC RBC AUTO-ENTMCNC: 32.1 G/DL (ref 31–37)
MCV RBC AUTO: 95.6 FL
OSMOLALITY SERPL CALC.SUM OF ELEC: 284 MOSM/KG (ref 275–295)
PATIENT FASTING Y/N/NP: NO
PLATELET # BLD AUTO: 365 10(3)UL (ref 150–450)
POTASSIUM SERPL-SCNC: 3.7 MMOL/L (ref 3.5–5.1)
PROTHROMBIN TIME: 12.9 SECONDS (ref 11.8–14.5)
RBC # BLD AUTO: 3.85 X10(6)UL
SODIUM SERPL-SCNC: 138 MMOL/L (ref 136–145)
WBC # BLD AUTO: 6.5 X10(3) UL (ref 4–11)

## 2021-01-19 PROCEDURE — 36415 COLL VENOUS BLD VENIPUNCTURE: CPT | Performed by: NURSE PRACTITIONER

## 2021-01-19 PROCEDURE — 80053 COMPREHEN METABOLIC PANEL: CPT | Performed by: NURSE PRACTITIONER

## 2021-01-19 PROCEDURE — 3080F DIAST BP >= 90 MM HG: CPT | Performed by: NURSE PRACTITIONER

## 2021-01-19 PROCEDURE — 85027 COMPLETE CBC AUTOMATED: CPT | Performed by: NURSE PRACTITIONER

## 2021-01-19 PROCEDURE — 3077F SYST BP >= 140 MM HG: CPT | Performed by: NURSE PRACTITIONER

## 2021-01-19 PROCEDURE — 82248 BILIRUBIN DIRECT: CPT | Performed by: NURSE PRACTITIONER

## 2021-01-19 PROCEDURE — 85610 PROTHROMBIN TIME: CPT | Performed by: NURSE PRACTITIONER

## 2021-01-19 PROCEDURE — 99213 OFFICE O/P EST LOW 20 MIN: CPT | Performed by: NURSE PRACTITIONER

## 2021-01-19 NOTE — PROGRESS NOTES
HPI    Pt present for bilat eye sclera is yellow  Friend told her her eyes look yellow in corners. Pt is seeing natropath and drinking different herbal teas.      Denies n/v/d, RUQ abd pain  Review of Systems   Constitutional: Negative for activity change Highest education level: Not on file    Occupational History      Occupation: realtor    Social Needs      Financial resource strain: Not on file      Food insecurity        Worry: Not on file        Inability: Not on file      Transportation needs Breast feeding: Not Asked        Reaction to local anesthetic: No    Social History Narrative      Not on file      Current Outpatient Medications   Medication Sig Dispense Refill   • Bacitra-Neomycin-Polymyxin-HC 1 % Ophthalmic Ointment Use on irrit Assessment and Plan:  Problem List Items Addressed This Visit        Other    Disorder of sclera - Primary    Relevant Orders    COMP METABOLIC PANEL (14) (Completed)    CBC, PLATELET; NO DIFFERENTIAL (Completed)    PROTHROMBIN TIME (PT) (Completed)    SYLVAIN

## 2021-01-19 NOTE — TELEPHONE ENCOUNTER
----- Message from Je Bronson sent at 1/18/2021  4:11 PM CST -----  Regarding: Other  Contact: [phone]  Dr Gertrude Mon,    I have been taking herbal tea and supplements and lots of different vitamins.   Someone in my family said that when I look to

## 2021-01-19 NOTE — TELEPHONE ENCOUNTER
Action Requested: Summary for Provider     []  Critical Lab, Recommendations Needed  [] Need Additional Advice  [x]   FYI    []   Need Orders  [] Need Medications Sent to Pharmacy  []  Other     SUMMARY: Patient complains of yellow sclera.    Per patient, s

## 2021-01-24 DIAGNOSIS — D50.8 OTHER IRON DEFICIENCY ANEMIA: Primary | ICD-10-CM

## 2021-01-24 RX ORDER — FERROUS SULFATE 325(65) MG
325 TABLET ORAL
Qty: 90 TABLET | Refills: 1 | Status: SHIPPED | OUTPATIENT
Start: 2021-01-24

## 2021-03-06 ENCOUNTER — TELEPHONE (OUTPATIENT)
Dept: OBGYN CLINIC | Facility: CLINIC | Age: 51
End: 2021-03-06

## 2021-03-06 DIAGNOSIS — R82.90 BAD ODOR OF URINE: Primary | ICD-10-CM

## 2021-03-06 NOTE — TELEPHONE ENCOUNTER
Patient calling stating having muscle spasm in the pelvic area. States tried taking a muscle relaxer and is not happening and is constant.      Please advise

## 2021-03-06 NOTE — TELEPHONE ENCOUNTER
Pt denies any urinary frequency, urinary urgency, burning with urination. Pt stated her bladder feels relieved after urination. Pt stated she has noticed a stronger urine odor. Order placed for UA with culture reflex to r/o UTI.  Pt advised to get this done

## 2021-03-06 NOTE — TELEPHONE ENCOUNTER
Pt calling to report that she feels like her \"pelvic muscle is spasming\". Pt stated that this started last night and is still experiencing the spasms this morning.  Pt stated she was able to sleep last night so thinks it stopped throughout the night, but

## 2021-03-08 NOTE — TELEPHONE ENCOUNTER
Informed pt that [facility] stated to wait until after period completely done for u/a if still symptoms. Pt stated understanding. Pt has an appt with [facility] tomorrow for pelvic spasms that are intermittent.

## 2021-03-08 NOTE — TELEPHONE ENCOUNTER
Pt states that she got her period on 3/6 and heavy bleeding on 3/7 and 3/8. Pt states that usually for two days she bleeds heavy and then it slows down. Pt states that she is changing a pad every 2-3 hrs. Pt states that she still has the spasms.       Pt

## 2021-03-08 NOTE — TELEPHONE ENCOUNTER
PRIVATE HISTORY    Pt wants only TAI to know that she used a high power toy 3/5/21. Pt started with spasms on 3/6/21. The spasms are intermittent. Pt made an appt to see TAI.

## 2021-03-09 ENCOUNTER — OFFICE VISIT (OUTPATIENT)
Dept: OBGYN CLINIC | Facility: CLINIC | Age: 51
End: 2021-03-09
Payer: COMMERCIAL

## 2021-03-09 VITALS
BODY MASS INDEX: 35 KG/M2 | DIASTOLIC BLOOD PRESSURE: 98 MMHG | WEIGHT: 202.19 LBS | SYSTOLIC BLOOD PRESSURE: 147 MMHG | HEART RATE: 92 BPM

## 2021-03-09 DIAGNOSIS — M62.838 SPASM OF MUSCLE: Primary | ICD-10-CM

## 2021-03-09 PROCEDURE — 3080F DIAST BP >= 90 MM HG: CPT | Performed by: OBSTETRICS & GYNECOLOGY

## 2021-03-09 PROCEDURE — 99213 OFFICE O/P EST LOW 20 MIN: CPT | Performed by: OBSTETRICS & GYNECOLOGY

## 2021-03-09 PROCEDURE — 3077F SYST BP >= 140 MM HG: CPT | Performed by: OBSTETRICS & GYNECOLOGY

## 2021-03-09 RX ORDER — CYCLOBENZAPRINE HCL 5 MG
5 TABLET ORAL 3 TIMES DAILY PRN
Qty: 15 TABLET | Refills: 0 | Status: SHIPPED | OUTPATIENT
Start: 2021-03-09 | End: 2021-04-01

## 2021-03-09 NOTE — PROGRESS NOTES
Han Aguirre is a 48year old female  Patient's last menstrual period was 2021. Patient presents with:  Gyn Problem: PELVIC SPASMS. Sudden onset of increased libido. No sexual partner. Used device which never used prior.  Next day w/ vagi coffee 2 cups        Occupational Exposure: Not Asked        Hobby Hazards: Not Asked        Sleep Concern: Not Asked        Stress Concern: Not Asked        Weight Concern: Not Asked        Special Diet: Not Asked        Back Care: Not Asked        Exe Use on irritated r eyelids bid, Disp: 3.5 g, Rfl: 3  •  Olopatadine HCl 0.1 % Ophthalmic Solution, 1 drop on each eyelid bid, Disp: 5 mL, Rfl: 11  •  Tretinoin 0.05 % External Cream, Apply to the entire face, chest and back as directed at bedtime. , Disp: 2 bruises  Extremities:   no edema, no cyanosis  Psychiatric:    oriented to time, place, person and situation.  Appropriate mood and affect    Pelvic Exam:  External Genitalia:  normal appearance, hair distribution, and no lesions  Urethral Meatus:   normal

## 2021-04-01 ENCOUNTER — OFFICE VISIT (OUTPATIENT)
Dept: FAMILY MEDICINE CLINIC | Facility: CLINIC | Age: 51
End: 2021-04-01
Payer: COMMERCIAL

## 2021-04-01 VITALS
SYSTOLIC BLOOD PRESSURE: 118 MMHG | HEART RATE: 94 BPM | BODY MASS INDEX: 32.64 KG/M2 | HEIGHT: 64 IN | DIASTOLIC BLOOD PRESSURE: 90 MMHG | WEIGHT: 191.19 LBS

## 2021-04-01 DIAGNOSIS — M79.644 PAIN OF FINGER OF RIGHT HAND: ICD-10-CM

## 2021-04-01 DIAGNOSIS — S61.230A PUNCTURE WOUND OF RIGHT INDEX FINGER: Primary | ICD-10-CM

## 2021-04-01 PROCEDURE — 99213 OFFICE O/P EST LOW 20 MIN: CPT | Performed by: FAMILY MEDICINE

## 2021-04-01 PROCEDURE — 3074F SYST BP LT 130 MM HG: CPT | Performed by: FAMILY MEDICINE

## 2021-04-01 PROCEDURE — 3080F DIAST BP >= 90 MM HG: CPT | Performed by: FAMILY MEDICINE

## 2021-04-01 PROCEDURE — 3008F BODY MASS INDEX DOCD: CPT | Performed by: FAMILY MEDICINE

## 2021-04-01 RX ORDER — LEVOCETIRIZINE DIHYDROCHLORIDE 5 MG/1
TABLET, FILM COATED ORAL
COMMUNITY
Start: 2021-03-09 | End: 2021-11-04

## 2021-04-01 NOTE — PROGRESS NOTES
Patient ID: Yolette Centeno is a 48year old female. HPI  Patient presents with:  Finger Pain    Last seen by me on 10/2/2020. Pt c/o pain on the distal part of the right index finger.  Pt broke a glass when taking it out of the  last we Dihydrochloride 5 MG Oral Tab      • Ferrous Sulfate 325 (65 Fe) MG Oral Tab Take 1 tablet (325 mg total) by mouth daily with breakfast. With orange juice.  90 tablet 1   • Bacitra-Neomycin-Polymyxin-HC 1 % Ophthalmic Ointment Use on irritated r eyelids bid piece and will most likely come out on its own. Stop manipulating the area as I feel the foreign body has been removed. Do warm soaks 2-3 times daily as I think that will help her. No antibiotics needed.   Pain of finger of right hand  Minimal at the sit

## 2021-04-09 DIAGNOSIS — E03.9 ACQUIRED HYPOTHYROIDISM: ICD-10-CM

## 2021-04-09 NOTE — TELEPHONE ENCOUNTER
Per pharmacy pt is requesting refill for the following medication. •  Levothyroxine Sodium 112 MCG Oral Tab, Take 1 tablet (112 mcg total) by mouth once daily. , Disp: 90 tablet, Rfl: 1

## 2021-04-10 RX ORDER — LEVOTHYROXINE SODIUM 112 UG/1
112 TABLET ORAL
Qty: 90 TABLET | Refills: 1 | Status: SHIPPED | OUTPATIENT
Start: 2021-04-10 | End: 2021-06-15

## 2021-05-27 ENCOUNTER — NURSE TRIAGE (OUTPATIENT)
Dept: FAMILY MEDICINE CLINIC | Facility: CLINIC | Age: 51
End: 2021-05-27

## 2021-05-27 ENCOUNTER — OFFICE VISIT (OUTPATIENT)
Dept: INTERNAL MEDICINE CLINIC | Facility: CLINIC | Age: 51
End: 2021-05-27
Payer: COMMERCIAL

## 2021-05-27 VITALS
SYSTOLIC BLOOD PRESSURE: 112 MMHG | DIASTOLIC BLOOD PRESSURE: 72 MMHG | BODY MASS INDEX: 33.83 KG/M2 | HEIGHT: 64 IN | HEART RATE: 76 BPM | WEIGHT: 198.13 LBS | OXYGEN SATURATION: 98 %

## 2021-05-27 DIAGNOSIS — M79.671 BILATERAL FOOT PAIN: ICD-10-CM

## 2021-05-27 DIAGNOSIS — M79.641 BILATERAL HAND PAIN: Primary | ICD-10-CM

## 2021-05-27 DIAGNOSIS — M79.672 BILATERAL FOOT PAIN: ICD-10-CM

## 2021-05-27 DIAGNOSIS — E03.9 ACQUIRED HYPOTHYROIDISM: ICD-10-CM

## 2021-05-27 DIAGNOSIS — M79.642 BILATERAL HAND PAIN: Primary | ICD-10-CM

## 2021-05-27 PROCEDURE — 3008F BODY MASS INDEX DOCD: CPT | Performed by: NURSE PRACTITIONER

## 2021-05-27 PROCEDURE — 3074F SYST BP LT 130 MM HG: CPT | Performed by: NURSE PRACTITIONER

## 2021-05-27 PROCEDURE — 99214 OFFICE O/P EST MOD 30 MIN: CPT | Performed by: NURSE PRACTITIONER

## 2021-05-27 PROCEDURE — 3078F DIAST BP <80 MM HG: CPT | Performed by: NURSE PRACTITIONER

## 2021-05-27 NOTE — PROGRESS NOTES
HPI:    Patient ID: Melissa Healy is a 48year old female. Patient of Dr. Leah Penaloza presents to clinic with complaints of B/L hand and feet pain x 7-8 weeks.  Pt states symptoms started after April 1st w/ 1st covid19 Pfizer shot rec'vd 2nd dose 1 month oz (89.9 kg)  04/01/21 : 191 lb 3.2 oz (86.7 kg)  03/09/21 : 202 lb 3.2 oz (91.7 kg)    BP Readings from Last 3 Encounters:  05/27/21 : 112/72  04/01/21 : 118/90  03/09/21 : (!) 147/98    Labs:   Lab Results   Component Value Date/Time    GLU 92 01/19/2021 MG Oral Tab Take 1 tablet (325 mg total) by mouth daily with breakfast. With orange juice.  90 tablet 1   • Bacitra-Neomycin-Polymyxin-HC 1 % Ophthalmic Ointment Use on irritated r eyelids bid 3.5 g 3   • Olopatadine HCl 0.1 % Ophthalmic Solution 1 drop on Pulse 76   Ht 5' 4\" (1.626 m)   Wt 198 lb 1.6 oz (89.9 kg)   LMP 03/06/2021   SpO2 98%   BMI 34.00 kg/m²   BP Readings from Last 3 Encounters:  05/27/21 : 112/72  04/01/21 : 118/90  03/09/21 : (!) 147/98    Wt Readings from Last 3 Encounters:  05/27/21 : normal range of motion and neck supple. Right foot: Normal range of motion and normal capillary refill. No swelling, tenderness or bony tenderness. Normal pulse. Left foot: Normal range of motion and normal capillary refill.  No swelling, tenderne

## 2021-05-27 NOTE — TELEPHONE ENCOUNTER
Action Requested: Summary for Provider     []  Critical Lab, Recommendations Needed  [] Need Additional Advice  []   FYI    []   Need Orders  [] Need Medications Sent to Pharmacy  []  Other     SUMMARY: Pt states the week after receiving her first COVID

## 2021-05-27 NOTE — PATIENT INSTRUCTIONS
ASSESSMENT/PLAN:   Bilateral hand pain  (primary encounter diagnosis)  Check blood  Ordered xray  Okay to take Tylenol 500 mg or ibuprofen 400 mg every 4-6 hours as needed. Do not take more than 3000 mg of Tylenol daily.     Acquired hypothyroidism  Chec your healthcare professional's instructions.

## 2021-06-10 ENCOUNTER — HOSPITAL ENCOUNTER (OUTPATIENT)
Dept: GENERAL RADIOLOGY | Age: 51
Discharge: HOME | End: 2021-06-10
Attending: NURSE PRACTITIONER
Payer: COMMERCIAL

## 2021-06-10 ENCOUNTER — LAB ENCOUNTER (OUTPATIENT)
Dept: LAB | Age: 51
End: 2021-06-10
Attending: NURSE PRACTITIONER
Payer: COMMERCIAL

## 2021-06-10 DIAGNOSIS — M79.642 BILATERAL HAND PAIN: ICD-10-CM

## 2021-06-10 DIAGNOSIS — M79.641 BILATERAL HAND PAIN: ICD-10-CM

## 2021-06-10 PROCEDURE — 36415 COLL VENOUS BLD VENIPUNCTURE: CPT | Performed by: NURSE PRACTITIONER

## 2021-06-10 PROCEDURE — 84443 ASSAY THYROID STIM HORMONE: CPT | Performed by: NURSE PRACTITIONER

## 2021-06-10 PROCEDURE — 86140 C-REACTIVE PROTEIN: CPT | Performed by: NURSE PRACTITIONER

## 2021-06-10 PROCEDURE — 85025 COMPLETE CBC W/AUTO DIFF WBC: CPT | Performed by: NURSE PRACTITIONER

## 2021-06-10 PROCEDURE — 80048 BASIC METABOLIC PNL TOTAL CA: CPT | Performed by: NURSE PRACTITIONER

## 2021-06-10 PROCEDURE — 84550 ASSAY OF BLOOD/URIC ACID: CPT | Performed by: NURSE PRACTITIONER

## 2021-06-10 PROCEDURE — 73130 X-RAY EXAM OF HAND: CPT | Performed by: NURSE PRACTITIONER

## 2021-06-10 PROCEDURE — 85652 RBC SED RATE AUTOMATED: CPT | Performed by: NURSE PRACTITIONER

## 2021-06-10 PROCEDURE — 84439 ASSAY OF FREE THYROXINE: CPT | Performed by: NURSE PRACTITIONER

## 2021-06-15 ENCOUNTER — TELEPHONE (OUTPATIENT)
Dept: INTERNAL MEDICINE CLINIC | Facility: CLINIC | Age: 51
End: 2021-06-15

## 2021-06-15 NOTE — TELEPHONE ENCOUNTER
Spoke to pt reg. Thyroid levels elevated TSH normal pt has fatigue, brittle nails, occ cold intolerance, muscle aches. Will increase levothyroxine to 125mcg and recheck in 4-6 weeks.

## 2021-09-11 DIAGNOSIS — E03.9 ACQUIRED HYPOTHYROIDISM: ICD-10-CM

## 2021-09-11 NOTE — PROCEDURES
Endometrial Biopsy     Pre-Procedure Care:   Consent was obtained. Procedure/risks were explained. Questions were answered. Correct patient was identified. Correct side and site were confirmed.       Birth control method(s) used:  Essure    Indication: FAMILY HISTORY:  Family history of stroke

## 2021-09-13 RX ORDER — LEVOTHYROXINE SODIUM 0.12 MG/1
125 TABLET ORAL DAILY
Qty: 90 TABLET | Refills: 0 | Status: SHIPPED | OUTPATIENT
Start: 2021-09-13 | End: 2021-12-16

## 2021-09-21 ENCOUNTER — TELEPHONE (OUTPATIENT)
Dept: FAMILY MEDICINE CLINIC | Facility: CLINIC | Age: 51
End: 2021-09-21

## 2021-09-21 DIAGNOSIS — M05.80 POLYARTHRITIS WITH POSITIVE RHEUMATOID FACTOR (HCC): ICD-10-CM

## 2021-09-21 DIAGNOSIS — M25.532 LEFT WRIST PAIN: ICD-10-CM

## 2021-09-21 DIAGNOSIS — M25.512 ACUTE PAIN OF LEFT SHOULDER: ICD-10-CM

## 2021-09-21 DIAGNOSIS — M25.50 POLYARTHRALGIA: ICD-10-CM

## 2021-09-21 DIAGNOSIS — R52 MIGRATORY PAIN: ICD-10-CM

## 2021-09-21 RX ORDER — MELOXICAM 15 MG/1
15 TABLET ORAL DAILY
Qty: 30 TABLET | Refills: 0 | Status: SHIPPED | OUTPATIENT
Start: 2021-09-21 | End: 2021-09-22

## 2021-09-21 NOTE — TELEPHONE ENCOUNTER
Patient injured her right knee 10/27/20 and saw Dr. Shayla Roy for this.   A couple days ago she had a party and was doing a lot of running around, tripped over a dog at the party and her knee slightly hit the floor, [place], drove all day out

## 2021-09-22 ENCOUNTER — TELEPHONE (OUTPATIENT)
Dept: FAMILY MEDICINE CLINIC | Facility: CLINIC | Age: 51
End: 2021-09-22

## 2021-09-22 DIAGNOSIS — M25.512 ACUTE PAIN OF LEFT SHOULDER: ICD-10-CM

## 2021-09-22 DIAGNOSIS — M25.50 POLYARTHRALGIA: ICD-10-CM

## 2021-09-22 DIAGNOSIS — M05.80 POLYARTHRITIS WITH POSITIVE RHEUMATOID FACTOR (HCC): ICD-10-CM

## 2021-09-22 DIAGNOSIS — M25.532 LEFT WRIST PAIN: ICD-10-CM

## 2021-09-22 DIAGNOSIS — R52 MIGRATORY PAIN: ICD-10-CM

## 2021-09-22 RX ORDER — MELOXICAM 15 MG/1
15 TABLET ORAL DAILY
Qty: 90 TABLET | Refills: 0 | Status: SHIPPED | OUTPATIENT
Start: 2021-09-22 | End: 2021-10-22

## 2021-09-22 NOTE — TELEPHONE ENCOUNTER
Left a detailed message to cell (ok per OMARI) regarding note below. Advised to call back as needed  My chart sent as well.

## 2021-09-23 ENCOUNTER — NURSE TRIAGE (OUTPATIENT)
Dept: FAMILY MEDICINE CLINIC | Facility: CLINIC | Age: 51
End: 2021-09-23

## 2021-09-23 NOTE — TELEPHONE ENCOUNTER
Left message to call back. Please transfer to triage. 2nd attempt.   Pt has not seen the [employer]) message as of 9/23 10:20

## 2021-10-26 ENCOUNTER — LAB ENCOUNTER (OUTPATIENT)
Dept: LAB | Age: 51
End: 2021-10-26
Attending: OBSTETRICS & GYNECOLOGY
Payer: COMMERCIAL

## 2021-10-26 PROCEDURE — 81001 URINALYSIS AUTO W/SCOPE: CPT | Performed by: OBSTETRICS & GYNECOLOGY

## 2021-10-26 PROCEDURE — 87086 URINE CULTURE/COLONY COUNT: CPT | Performed by: OBSTETRICS & GYNECOLOGY

## 2021-11-04 ENCOUNTER — OFFICE VISIT (OUTPATIENT)
Dept: FAMILY MEDICINE CLINIC | Facility: CLINIC | Age: 51
End: 2021-11-04
Payer: COMMERCIAL

## 2021-11-04 VITALS
BODY MASS INDEX: 32.84 KG/M2 | SYSTOLIC BLOOD PRESSURE: 144 MMHG | WEIGHT: 192.38 LBS | HEART RATE: 84 BPM | HEIGHT: 64 IN | DIASTOLIC BLOOD PRESSURE: 98 MMHG

## 2021-11-04 DIAGNOSIS — E03.9 ACQUIRED HYPOTHYROIDISM: ICD-10-CM

## 2021-11-04 DIAGNOSIS — L60.3 BRITTLE NAILS: ICD-10-CM

## 2021-11-04 DIAGNOSIS — E55.9 VITAMIN D DEFICIENCY: ICD-10-CM

## 2021-11-04 DIAGNOSIS — R52 MIGRATORY PAIN: ICD-10-CM

## 2021-11-04 DIAGNOSIS — Z12.31 VISIT FOR SCREENING MAMMOGRAM: ICD-10-CM

## 2021-11-04 DIAGNOSIS — F41.9 ANXIETY: ICD-10-CM

## 2021-11-04 DIAGNOSIS — Z00.00 ADULT GENERAL MEDICAL EXAM: Primary | ICD-10-CM

## 2021-11-04 DIAGNOSIS — M79.10 MYALGIA: ICD-10-CM

## 2021-11-04 DIAGNOSIS — F45.20 HYPOCHONDRIACAL DISORDER: ICD-10-CM

## 2021-11-04 DIAGNOSIS — R76.8 POSITIVE ANA (ANTINUCLEAR ANTIBODY): ICD-10-CM

## 2021-11-04 DIAGNOSIS — M05.80 POLYARTHRITIS WITH POSITIVE RHEUMATOID FACTOR (HCC): ICD-10-CM

## 2021-11-04 PROCEDURE — 90471 IMMUNIZATION ADMIN: CPT | Performed by: FAMILY MEDICINE

## 2021-11-04 PROCEDURE — 99213 OFFICE O/P EST LOW 20 MIN: CPT | Performed by: FAMILY MEDICINE

## 2021-11-04 PROCEDURE — 99396 PREV VISIT EST AGE 40-64: CPT | Performed by: FAMILY MEDICINE

## 2021-11-04 PROCEDURE — 3077F SYST BP >= 140 MM HG: CPT | Performed by: FAMILY MEDICINE

## 2021-11-04 PROCEDURE — 3008F BODY MASS INDEX DOCD: CPT | Performed by: FAMILY MEDICINE

## 2021-11-04 PROCEDURE — 3080F DIAST BP >= 90 MM HG: CPT | Performed by: FAMILY MEDICINE

## 2021-11-04 PROCEDURE — 90686 IIV4 VACC NO PRSV 0.5 ML IM: CPT | Performed by: FAMILY MEDICINE

## 2021-11-04 RX ORDER — ERGOCALCIFEROL 1.25 MG/1
50000 CAPSULE ORAL
COMMUNITY
Start: 2021-10-10 | End: 2021-11-04

## 2021-11-04 NOTE — PROGRESS NOTES
Patient ID: Stephanie Cyr is a 46year old female. HPI  Patient presents with:  Physical: Adult exam     Last physical on 10/2/2020. 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 She does not smoke.      Pt is living in a [facility] allergic to so she does not bring it home. She has seen Dr. Amara Dalton. Pt c/o brittle nails. She uses a lot of . Pt is taking collagen.      Pt does not have a blood pressure cuff at home and I advised her to purchase one and begin checking her blood  (H) 06/10/2021    BUN 9 06/10/2021    BUNCREA 12.5 06/10/2021    CREATSERUM 0.72 06/10/2021    ANIONGAP 6 06/10/2021    GFRNAA 98 06/10/2021    GFRAA 113 06/10/2021    CA 9.0 06/10/2021    OSMOCALC 283 06/10/2021    ALKPHO 74 01/19/2021    AST 15 0 12/10/2020     =======================================================    Wt Readings from Last 6 Encounters:  11/04/21 : 192 lb 6.4 oz  05/27/21 : 198 lb 1.6 oz  04/01/21 : 191 lb 3.2 oz  03/09/21 : 202 lb 3.2 oz  01/19/21 : 191 lb  12/11/20 : 192 lb Alcohol/week: 0.0 standard drinks        Comment: occ      Drug use: No      Sexual activity: Yes        Partners: Male    Other Topics      Concerns:         Service: Not Asked        Blood Transfusions: Not Asked        Caffeine Concern:  Yes Abused: Not on file      Sexually Abused: Not on file  Housing Stability:       Unable to Pay for Housing in the Last Year: Not on file      Number of Places Lived in the Last Year: Not on file      Unstable Housing in the Last Year: Not on file       Curr Neck supple. No thyromegaly present. Cardiovascular: Normal rate, regular rhythm and no murmur heard. Normal pedal pulses. Pulmonary/Chest: Effort normal and breath sounds normal. No respiratory distress. Abdominal: Soft.  Bowel sounds are normal. The see if we can get her out of this apartment building sooner than later. Hypochondriacal disorder  Self-admitted  Migratory pain  I think yoga or Pilates will help. She needs to get up and stretch after sitting for extended periods of time as well.   Posit

## 2021-12-11 DIAGNOSIS — E03.9 ACQUIRED HYPOTHYROIDISM: ICD-10-CM

## 2021-12-16 DIAGNOSIS — E03.9 ACQUIRED HYPOTHYROIDISM: ICD-10-CM

## 2021-12-16 RX ORDER — LEVOTHYROXINE SODIUM 0.12 MG/1
TABLET ORAL
Qty: 30 TABLET | Refills: 0 | Status: SHIPPED | OUTPATIENT
Start: 2021-12-16 | End: 2022-01-20

## 2021-12-16 RX ORDER — LEVOTHYROXINE SODIUM 0.12 MG/1
TABLET ORAL
Qty: 90 TABLET | Refills: 0 | OUTPATIENT
Start: 2021-12-16

## 2021-12-20 ENCOUNTER — TELEPHONE (OUTPATIENT)
Dept: FAMILY MEDICINE CLINIC | Facility: CLINIC | Age: 51
End: 2021-12-20

## 2021-12-21 NOTE — TELEPHONE ENCOUNTER
Phone call made to patient no answer, I left a voicemail that form is at ado second floor. If patient calls back, please advice.

## 2021-12-23 NOTE — TELEPHONE ENCOUNTER
2nd attempt/left voice mail message for pt to call back. Please see message below when the call is returned.

## 2022-01-19 DIAGNOSIS — E03.9 ACQUIRED HYPOTHYROIDISM: ICD-10-CM

## 2022-01-19 NOTE — TELEPHONE ENCOUNTER
Please review; protocol failed/No Protcol    Requested Prescriptions   Pending Prescriptions Disp Refills    LEVOTHYROXINE 125 MCG Oral Tab [Pharmacy Med Name: LEVOTHYROXINE 0.125MG (125MCG) TAB] 30 tablet 0     Sig: TAKE 1 TABLET(125 MCG) BY MOUTH DAILY pt signed out to me from dr morales, pt presented with chest burning after drinking champagne, second set of cardiac enzymes pending labs within normal, pt states the morphine given to her earlier did not help, protonix ordered, omeprazole sent to her pharmacy and pt referred to gastroenterology

## 2022-01-20 ENCOUNTER — LAB ENCOUNTER (OUTPATIENT)
Dept: LAB | Age: 52
End: 2022-01-20
Attending: FAMILY MEDICINE
Payer: COMMERCIAL

## 2022-01-20 DIAGNOSIS — E03.9 ACQUIRED HYPOTHYROIDISM: ICD-10-CM

## 2022-01-20 LAB — TSI SER-ACNC: 1.29 MIU/ML (ref 0.36–3.74)

## 2022-01-20 PROCEDURE — 36415 COLL VENOUS BLD VENIPUNCTURE: CPT

## 2022-01-20 PROCEDURE — 84443 ASSAY THYROID STIM HORMONE: CPT

## 2022-01-20 RX ORDER — LEVOTHYROXINE SODIUM 0.12 MG/1
TABLET ORAL
Qty: 30 TABLET | Refills: 0 | Status: SHIPPED | OUTPATIENT
Start: 2022-01-20 | End: 2022-01-20

## 2022-01-20 RX ORDER — LEVOTHYROXINE SODIUM 0.12 MG/1
TABLET ORAL
Qty: 90 TABLET | Refills: 0 | OUTPATIENT
Start: 2022-01-20

## 2022-01-21 ENCOUNTER — OFFICE VISIT (OUTPATIENT)
Dept: FAMILY MEDICINE CLINIC | Facility: CLINIC | Age: 52
End: 2022-01-21
Payer: COMMERCIAL

## 2022-01-21 VITALS
HEART RATE: 79 BPM | DIASTOLIC BLOOD PRESSURE: 110 MMHG | TEMPERATURE: 97 F | HEIGHT: 64 IN | BODY MASS INDEX: 34.11 KG/M2 | WEIGHT: 199.81 LBS | SYSTOLIC BLOOD PRESSURE: 174 MMHG

## 2022-01-21 DIAGNOSIS — S20.359A: Primary | ICD-10-CM

## 2022-01-21 DIAGNOSIS — F41.9 ANXIETY: ICD-10-CM

## 2022-01-21 DIAGNOSIS — R03.0 ELEVATED BLOOD PRESSURE READING: ICD-10-CM

## 2022-01-21 PROCEDURE — 99214 OFFICE O/P EST MOD 30 MIN: CPT | Performed by: FAMILY MEDICINE

## 2022-01-21 PROCEDURE — 3077F SYST BP >= 140 MM HG: CPT | Performed by: FAMILY MEDICINE

## 2022-01-21 PROCEDURE — 3008F BODY MASS INDEX DOCD: CPT | Performed by: FAMILY MEDICINE

## 2022-01-21 PROCEDURE — 3080F DIAST BP >= 90 MM HG: CPT | Performed by: FAMILY MEDICINE

## 2022-01-21 NOTE — PROGRESS NOTES
Patient ID: Richa Scott is a 46year old female. HPI  Patient presents with: Other: possible glass piece on sternum/chest    Last seen by me on 11/4/2021. Pt c/o possible small piece of glass stuck in the chest x3 days.  She was taking a glass [facility] ENDOSCOPY   • EGD  01/20/2011   • HAND/FINGER SURGERY UNLISTED Right 1990    little finger osteotomy   • HEMORRHOIDECTOMY,EXTERNAL  10/22/2020    w/ skin tag ex          Current Outpatient Medications   Medication Sig Dispense Refill   • levothyroxine but she is convinced there is a shard of glass inside. Psychiatry: Anxious affect. Vitals reviewed.            Assessment/Plan:      Diagnoses and all orders for this visit:    Foreign body of skin of chest, initial encounter  Area was prepped and a 11 personal performance and is accurate and complete.   Manjit Costello DO, 1/21/2022, 11:20 AM

## 2022-01-24 ENCOUNTER — HOSPITAL ENCOUNTER (OUTPATIENT)
Dept: MAMMOGRAPHY | Age: 52
Discharge: HOME | End: 2022-01-24
Attending: FAMILY MEDICINE
Payer: COMMERCIAL

## 2022-01-24 DIAGNOSIS — Z12.31 VISIT FOR SCREENING MAMMOGRAM: ICD-10-CM

## 2022-01-24 PROCEDURE — 77067 SCR MAMMO BI INCL CAD: CPT | Performed by: FAMILY MEDICINE

## 2022-01-24 PROCEDURE — 77063 BREAST TOMOSYNTHESIS BI: CPT | Performed by: FAMILY MEDICINE

## 2022-01-28 ENCOUNTER — PATIENT MESSAGE (OUTPATIENT)
Dept: OBGYN CLINIC | Facility: CLINIC | Age: 52
End: 2022-01-28

## 2022-01-28 DIAGNOSIS — R10.32 LLQ PAIN: Primary | ICD-10-CM

## 2022-01-28 NOTE — TELEPHONE ENCOUNTER
From: Lulu Valle  To: Viviana Nelson MD  Sent: 1/28/2022 3:54 PM CST  Subject: Essure in left fallopian tube pain    When I lay down my left fallopian tube throbs and hurts.  I have essure in for about 10 yrs and I use to have pains in the beginning,

## 2022-01-30 ENCOUNTER — HOSPITAL ENCOUNTER (OUTPATIENT)
Dept: ULTRASOUND IMAGING | Age: 52
Discharge: HOME | End: 2022-01-30
Attending: OBSTETRICS & GYNECOLOGY
Payer: COMMERCIAL

## 2022-01-30 DIAGNOSIS — R10.32 LLQ PAIN: ICD-10-CM

## 2022-01-30 PROCEDURE — 76856 US EXAM PELVIC COMPLETE: CPT | Performed by: OBSTETRICS & GYNECOLOGY

## 2022-01-30 PROCEDURE — 93975 VASCULAR STUDY: CPT | Performed by: OBSTETRICS & GYNECOLOGY

## 2022-01-30 PROCEDURE — 76830 TRANSVAGINAL US NON-OB: CPT | Performed by: OBSTETRICS & GYNECOLOGY

## 2022-02-03 ENCOUNTER — PATIENT MESSAGE (OUTPATIENT)
Dept: ALLERGY | Facility: CLINIC | Age: 52
End: 2022-02-03

## 2022-02-04 NOTE — TELEPHONE ENCOUNTER
From: Katie Tillman  To: Yaquelin Redd MD  Sent: 2/3/2022 8:58 PM CST  Subject: Shots for my allergies    I would like to get shots for my dust mite and cat allergies to make them go away. Please let me know how to proceed.     Thank you

## 2022-02-07 ENCOUNTER — NURSE ONLY (OUTPATIENT)
Dept: ALLERGY | Facility: CLINIC | Age: 52
End: 2022-02-07
Payer: COMMERCIAL

## 2022-02-07 ENCOUNTER — OFFICE VISIT (OUTPATIENT)
Dept: ALLERGY | Facility: CLINIC | Age: 52
End: 2022-02-07
Payer: COMMERCIAL

## 2022-02-07 VITALS — HEART RATE: 90 BPM | OXYGEN SATURATION: 100 % | DIASTOLIC BLOOD PRESSURE: 116 MMHG | SYSTOLIC BLOOD PRESSURE: 175 MMHG

## 2022-02-07 DIAGNOSIS — J30.89 PERENNIAL ALLERGIC RHINITIS WITH SEASONAL VARIATION: Primary | ICD-10-CM

## 2022-02-07 DIAGNOSIS — J30.89 ENVIRONMENTAL AND SEASONAL ALLERGIES: ICD-10-CM

## 2022-02-07 DIAGNOSIS — J30.2 PERENNIAL ALLERGIC RHINITIS WITH SEASONAL VARIATION: Primary | ICD-10-CM

## 2022-02-07 PROCEDURE — 95024 IQ TESTS W/ALLERGENIC XTRCS: CPT | Performed by: ALLERGY & IMMUNOLOGY

## 2022-02-07 PROCEDURE — 3077F SYST BP >= 140 MM HG: CPT | Performed by: ALLERGY & IMMUNOLOGY

## 2022-02-07 PROCEDURE — 3080F DIAST BP >= 90 MM HG: CPT | Performed by: ALLERGY & IMMUNOLOGY

## 2022-02-07 PROCEDURE — 95004 PERQ TESTS W/ALRGNC XTRCS: CPT | Performed by: ALLERGY & IMMUNOLOGY

## 2022-02-07 PROCEDURE — 99214 OFFICE O/P EST MOD 30 MIN: CPT | Performed by: ALLERGY & IMMUNOLOGY

## 2022-02-07 RX ORDER — LEVOCETIRIZINE DIHYDROCHLORIDE 5 MG/1
5 TABLET, FILM COATED ORAL NIGHTLY
Qty: 30 TABLET | Refills: 0 | Status: SHIPPED | OUTPATIENT
Start: 2022-02-07 | End: 2022-02-07

## 2022-02-07 RX ORDER — ALBUTEROL SULFATE 90 UG/1
2 AEROSOL, METERED RESPIRATORY (INHALATION) EVERY 4 HOURS PRN
Qty: 1 EACH | Refills: 0 | Status: SHIPPED | OUTPATIENT
Start: 2022-02-07

## 2022-02-07 RX ORDER — LEVOCETIRIZINE DIHYDROCHLORIDE 5 MG/1
TABLET, FILM COATED ORAL
Qty: 90 TABLET | Refills: 0 | Status: SHIPPED | OUTPATIENT
Start: 2022-02-07

## 2022-02-08 NOTE — PATIENT INSTRUCTIONS
1. AR  Handouts on allergies and avoidance measures provided and reviewed including the potential treatment option of immunotherapy   Patient defers treatment with allergy medications at this time  May consider Xyzal, levocetirizine 5 mg once a day as an antihistamine if having significant runny nose sneezing itchy watery eyes  May consider Flonase or Nasacort 2 sprays per nostril once a day if having prominent nasal congestion postnasal drip  Consider nasal saline sinus rinses  Start immunotherapy to dog and dust mites. Check serum IgE to cat as patient still has concerns as cat as a trigger     #2 COVID vaccine up-to-date x2 doses.   Recommend booster once indicated    #3 flu vaccine up-to-date

## 2022-02-09 ENCOUNTER — NURSE TRIAGE (OUTPATIENT)
Dept: FAMILY MEDICINE CLINIC | Facility: CLINIC | Age: 52
End: 2022-02-09

## 2022-02-09 ENCOUNTER — PATIENT MESSAGE (OUTPATIENT)
Dept: FAMILY MEDICINE CLINIC | Facility: CLINIC | Age: 52
End: 2022-02-09

## 2022-02-09 RX ORDER — AMLODIPINE BESYLATE 5 MG/1
5 TABLET ORAL DAILY
Qty: 90 TABLET | Refills: 1 | Status: SHIPPED | OUTPATIENT
Start: 2022-02-09 | End: 2023-02-04

## 2022-02-09 NOTE — TELEPHONE ENCOUNTER
----- Message from Reina Young RN sent at 2/9/2022 10:26 AM CST -----  Regarding: FW: Blood pressure readings      ----- Message -----  From: Clair Vazquez  Sent: 2/9/2022   8:22 AM CST  To: Em Rn Triage  Subject: Blood pressure readings                          This morning   173  124  82    Yesterday  159      188     178  113       126    117  84          76       98    Monday  140     148    137  101      101     92  88          80       87  Monday at the allergy doctor appointment was very high    Sunday  153     116  101      85   77        100

## 2022-02-09 NOTE — TELEPHONE ENCOUNTER
Carlitos Pinedo RN 2/9/2022 10:26 AM CST        ----- Message -----  From: Lulu Valle  Sent: 2/9/2022 8:22 AM CST  To: Ilda Rn Triage  Subject: Blood pressure readings     This morning   173  124  82    Yesterday  159 188 178  113 126 117  84 76 98    Monday  140 148 137  101 101 92  88 80 87  Monday at the allergy doctor appointment was very high    Sunday  153 116  101 85   77 100

## 2022-02-10 ENCOUNTER — HOSPITAL ENCOUNTER (EMERGENCY)
Facility: HOSPITAL | Age: 52
Discharge: HOME | End: 2022-02-10
Attending: EMERGENCY MEDICINE
Payer: COMMERCIAL

## 2022-02-10 ENCOUNTER — TELEMEDICINE (OUTPATIENT)
Dept: FAMILY MEDICINE CLINIC | Facility: CLINIC | Age: 52
End: 2022-02-10

## 2022-02-10 ENCOUNTER — PATIENT MESSAGE (OUTPATIENT)
Dept: FAMILY MEDICINE CLINIC | Facility: CLINIC | Age: 52
End: 2022-02-10

## 2022-02-10 VITALS
OXYGEN SATURATION: 98 % | DIASTOLIC BLOOD PRESSURE: 110 MMHG | HEIGHT: 64 IN | HEART RATE: 80 BPM | SYSTOLIC BLOOD PRESSURE: 175 MMHG | BODY MASS INDEX: 32.44 KG/M2 | TEMPERATURE: 98 F | RESPIRATION RATE: 17 BRPM | WEIGHT: 190 LBS

## 2022-02-10 DIAGNOSIS — I10 HYPERTENSION, UNSPECIFIED TYPE: Primary | ICD-10-CM

## 2022-02-10 DIAGNOSIS — I10 ESSENTIAL HYPERTENSION: Primary | ICD-10-CM

## 2022-02-10 DIAGNOSIS — J30.89 OTHER ALLERGIC RHINITIS: ICD-10-CM

## 2022-02-10 DIAGNOSIS — F41.0 SEVERE ANXIETY WITH PANIC: ICD-10-CM

## 2022-02-10 DIAGNOSIS — F41.9 ANXIETY: ICD-10-CM

## 2022-02-10 LAB
ANION GAP SERPL CALC-SCNC: 5 MMOL/L (ref 0–18)
BASOPHILS NFR BLD AUTO: 0.6 %
BUN BLD-MCNC: 13 MG/DL (ref 7–18)
BUN/CREAT SERPL: 18.8 (ref 10–20)
CALCIUM BLD-MCNC: 8.8 MG/DL (ref 8.5–10.1)
CHLORIDE SERPL-SCNC: 106 MMOL/L (ref 98–112)
CO2 SERPL-SCNC: 26 MMOL/L (ref 21–32)
CREAT BLD-MCNC: 0.69 MG/DL
DEPRECATED RDW RBC AUTO: 45.9 FL (ref 35.1–46.3)
EOSINOPHIL # BLD AUTO: 0.12 X10(3) UL (ref 0–0.7)
EOSINOPHIL NFR BLD AUTO: 2.6 %
ERYTHROCYTE [DISTWIDTH] IN BLOOD BY AUTOMATED COUNT: 12.2 % (ref 11–15)
GLUCOSE BLD-MCNC: 101 MG/DL (ref 70–99)
HCT VFR BLD AUTO: 38.2 %
HGB BLD-MCNC: 12.6 G/DL
IMM GRANULOCYTES # BLD AUTO: 0.01 X10(3) UL (ref 0–1)
IMM GRANULOCYTES NFR BLD: 0.2 %
LYMPHOCYTES # BLD AUTO: 1.17 X10(3) UL (ref 1–4)
LYMPHOCYTES NFR BLD AUTO: 25.2 %
MCH RBC QN AUTO: 33.5 PG (ref 26–34)
MCV RBC AUTO: 101.6 FL
MONOCYTES # BLD AUTO: 0.4 X10(3) UL (ref 0.1–1)
MONOCYTES NFR BLD AUTO: 8.6 %
NEUTROPHILS # BLD AUTO: 2.92 X10 (3) UL (ref 1.5–7.7)
NEUTROPHILS # BLD AUTO: 2.92 X10(3) UL (ref 1.5–7.7)
NEUTROPHILS NFR BLD AUTO: 62.8 %
OSMOLALITY SERPL CALC.SUM OF ELEC: 284 MOSM/KG (ref 275–295)
PLATELET # BLD AUTO: 235 10(3)UL (ref 150–450)
RBC # BLD AUTO: 3.76 X10(6)UL
SODIUM SERPL-SCNC: 137 MMOL/L (ref 136–145)
TROPONIN I HIGH SENSITIVITY: 23 NG/L
WBC # BLD AUTO: 4.7 X10(3) UL (ref 4–11)

## 2022-02-10 PROCEDURE — 93010 ELECTROCARDIOGRAM REPORT: CPT | Performed by: EMERGENCY MEDICINE

## 2022-02-10 PROCEDURE — 99284 EMERGENCY DEPT VISIT MOD MDM: CPT

## 2022-02-10 PROCEDURE — 99215 OFFICE O/P EST HI 40 MIN: CPT | Performed by: FAMILY MEDICINE

## 2022-02-10 PROCEDURE — 93005 ELECTROCARDIOGRAM TRACING: CPT

## 2022-02-10 PROCEDURE — 80048 BASIC METABOLIC PNL TOTAL CA: CPT | Performed by: EMERGENCY MEDICINE

## 2022-02-10 PROCEDURE — 36415 COLL VENOUS BLD VENIPUNCTURE: CPT

## 2022-02-10 PROCEDURE — 85025 COMPLETE CBC W/AUTO DIFF WBC: CPT | Performed by: EMERGENCY MEDICINE

## 2022-02-10 PROCEDURE — 84484 ASSAY OF TROPONIN QUANT: CPT | Performed by: EMERGENCY MEDICINE

## 2022-02-10 NOTE — TELEPHONE ENCOUNTER
Call placed to patient. Patient identity confirmed by name and date of birth  Pt advised of provider response below. Patient verbalizes understanding and agrees with plan. Patient reports being seen in the ED last night due to continued BP elevations was seen and evaluated. Patient reports that BP was much better during ED stay believes elevations are linked to anxiety     Per patient request, scheduled for video visit today with provider regarding anxiety. Advised of directions for joining video visit, e-check in process. Patient verbalizes understanding and agrees with plan.

## 2022-02-10 NOTE — ED INITIAL ASSESSMENT (HPI)
Patient arrives ambulatory through triage with c/o of hypertension. Patient states that her last couple of doctor visits her blood pressure has been high. Patient states that she has been ignoring this.

## 2022-02-10 NOTE — TELEPHONE ENCOUNTER
From: Ellen Gardner  To: Ramsey Morrell DO  Sent: 2/10/2022 2:10 AM CST  Subject: Blood Pressure High    Dr Amy Garcia My pulse is always good but I have out of control high blood pressure, my readings just now are 196/120 200/126 I talked to your nurse today. They have been high all day. After my shower my blood pressure went to 139/84 which was great but have gone up since then that is why I keep checking. My cousin told me not to go to sleep until the bottom number is 100. She told me to sit with my legs up, cold rag on back of neck, drink 8 oz water with 1 whole lemon juice. I don't have a headache, neckache, blurred vision. I think my blood pressure is closely with anxiety.  Now it is 160/108

## 2022-02-10 NOTE — TELEPHONE ENCOUNTER
Yes, lets start you on Norvasc 5 mg once daily, with or without food. Then follow-up with me in 3 weeks.   There are some studies that state taking it at nighttime right before bed is most beneficial.

## 2022-02-11 ENCOUNTER — LAB ENCOUNTER (OUTPATIENT)
Dept: LAB | Age: 52
End: 2022-02-11
Attending: ALLERGY & IMMUNOLOGY
Payer: COMMERCIAL

## 2022-02-11 DIAGNOSIS — J30.2 PERENNIAL ALLERGIC RHINITIS WITH SEASONAL VARIATION: ICD-10-CM

## 2022-02-11 DIAGNOSIS — J30.89 PERENNIAL ALLERGIC RHINITIS WITH SEASONAL VARIATION: ICD-10-CM

## 2022-02-11 PROCEDURE — 36415 COLL VENOUS BLD VENIPUNCTURE: CPT

## 2022-02-11 PROCEDURE — 86003 ALLG SPEC IGE CRUDE XTRC EA: CPT

## 2022-02-14 LAB
A ALTERNATA IGE QN: 0.11 KUA/L (ref ?–0.1)
C HERBARUM IGE QN: <0.1 KUA/L (ref ?–0.1)
CAT DANDER IGE QN: <0.1 KUA/L (ref ?–0.1)

## 2022-02-15 ENCOUNTER — TELEPHONE (OUTPATIENT)
Dept: ALLERGY | Facility: CLINIC | Age: 52
End: 2022-02-15

## 2022-02-15 NOTE — TELEPHONE ENCOUNTER
----- Message from Josh Forte MD sent at 2/14/2022  4:21 PM CST -----   Please call patient with recent serum IgE testing to select allergens.   Testing was negative to cat  Patient did show  low grade IgE production to Alternaria 0.11  Testing was negative for Cladosporium

## 2022-02-15 NOTE — TELEPHONE ENCOUNTER
Dr. Yates Jose,   With the alternaria level below, are you going to add mold mix onto her allergy injection orders for AIT? Once clarified, we will contact patient with results.

## 2022-02-17 NOTE — TELEPHONE ENCOUNTER
Patient's call transferred from the phone room. Confirmed patient by name and . Patient given test results per Dr. Radha Jessica as below . . . ----- Message from Magalie Hummel MD sent at 2022  4:21 PM CST -----   Please call patient with recent serum IgE testing to select allergens. Testing was negative to cat  Patient did show  low grade IgE production to Alternaria 0.11  Testing was negative for Cladosporium    Patient informed that mold was added to her Allergy Injection Orders. Patient presenting for first AIT injection appt 2022. Patient reminded to take a Xyzal 5 mg night prior to presenting for AIT injections per Dr. Radha Jessica. Patient offers that she will take antihistamine the night prior to AIT. but will not take on a consistent basis.

## 2022-02-23 ENCOUNTER — NURSE ONLY (OUTPATIENT)
Dept: ALLERGY | Facility: CLINIC | Age: 52
End: 2022-02-23
Payer: COMMERCIAL

## 2022-02-23 DIAGNOSIS — E03.9 ACQUIRED HYPOTHYROIDISM: ICD-10-CM

## 2022-02-23 DIAGNOSIS — J30.89 ENVIRONMENTAL AND SEASONAL ALLERGIES: ICD-10-CM

## 2022-02-23 PROCEDURE — 95117 IMMUNOTHERAPY INJECTIONS: CPT | Performed by: ALLERGY & IMMUNOLOGY

## 2022-02-23 PROCEDURE — 95165 ANTIGEN THERAPY SERVICES: CPT | Performed by: ALLERGY & IMMUNOLOGY

## 2022-02-23 RX ORDER — LEVOTHYROXINE SODIUM 0.12 MG/1
TABLET ORAL
Qty: 30 TABLET | Refills: 0 | OUTPATIENT
Start: 2022-02-23

## 2022-02-23 NOTE — TELEPHONE ENCOUNTER
Patient in office to begin AIT injections. She is insisting that she is allergic to trees as she had a \"bad reaction\" to a fern that she bought and states that flowers bother her in the springtime. Pt did not show any allergies to trees during skin and intradermal testing on 2/7/2022. She is requesting that serum IgE testing for trees be ordered of possible. RN to notify her of Dr. Inez Saha recommendations.

## 2022-02-24 ENCOUNTER — LAB ENCOUNTER (OUTPATIENT)
Dept: LAB | Age: 52
End: 2022-02-24
Attending: ALLERGY & IMMUNOLOGY
Payer: COMMERCIAL

## 2022-02-24 DIAGNOSIS — J30.89 PERENNIAL ALLERGIC RHINITIS WITH SEASONAL VARIATION: ICD-10-CM

## 2022-02-24 DIAGNOSIS — J30.2 PERENNIAL ALLERGIC RHINITIS WITH SEASONAL VARIATION: ICD-10-CM

## 2022-02-24 DIAGNOSIS — Z91.09 ENVIRONMENTAL ALLERGIES: ICD-10-CM

## 2022-02-24 PROCEDURE — 86003 ALLG SPEC IGE CRUDE XTRC EA: CPT

## 2022-02-24 PROCEDURE — 36415 COLL VENOUS BLD VENIPUNCTURE: CPT

## 2022-02-25 ENCOUNTER — OFFICE VISIT (OUTPATIENT)
Dept: OBGYN CLINIC | Facility: CLINIC | Age: 52
End: 2022-02-25
Payer: COMMERCIAL

## 2022-02-25 VITALS
HEART RATE: 80 BPM | DIASTOLIC BLOOD PRESSURE: 77 MMHG | BODY MASS INDEX: 35 KG/M2 | SYSTOLIC BLOOD PRESSURE: 112 MMHG | WEIGHT: 202.19 LBS

## 2022-02-25 DIAGNOSIS — Z01.411 ENCOUNTER FOR GYNECOLOGICAL EXAMINATION WITH ABNORMAL FINDING: Primary | ICD-10-CM

## 2022-02-25 DIAGNOSIS — N92.1 MENORRHAGIA WITH IRREGULAR CYCLE: ICD-10-CM

## 2022-02-25 DIAGNOSIS — Z11.3 SCREEN FOR STD (SEXUALLY TRANSMITTED DISEASE): ICD-10-CM

## 2022-02-25 DIAGNOSIS — Z12.4 SCREENING FOR MALIGNANT NEOPLASM OF CERVIX: ICD-10-CM

## 2022-02-25 DIAGNOSIS — R10.2 PELVIC PAIN IN FEMALE: ICD-10-CM

## 2022-02-25 PROCEDURE — 99396 PREV VISIT EST AGE 40-64: CPT | Performed by: OBSTETRICS & GYNECOLOGY

## 2022-02-25 PROCEDURE — 3074F SYST BP LT 130 MM HG: CPT | Performed by: OBSTETRICS & GYNECOLOGY

## 2022-02-25 PROCEDURE — 3078F DIAST BP <80 MM HG: CPT | Performed by: OBSTETRICS & GYNECOLOGY

## 2022-02-25 PROCEDURE — 99213 OFFICE O/P EST LOW 20 MIN: CPT | Performed by: OBSTETRICS & GYNECOLOGY

## 2022-02-28 ENCOUNTER — TELEPHONE (OUTPATIENT)
Dept: ALLERGY | Facility: CLINIC | Age: 52
End: 2022-02-28

## 2022-02-28 ENCOUNTER — NURSE ONLY (OUTPATIENT)
Dept: ALLERGY | Facility: CLINIC | Age: 52
End: 2022-02-28
Payer: COMMERCIAL

## 2022-02-28 DIAGNOSIS — J30.89 ENVIRONMENTAL AND SEASONAL ALLERGIES: ICD-10-CM

## 2022-02-28 LAB
BOXELDER IGE QN: 0.3 KUA/L (ref ?–0.1)
C TRACH DNA SPEC QL NAA+PROBE: NEGATIVE
COTTONWOOD IGE QN: <0.1 KUA/L (ref ?–0.1)
HPV I/H RISK 1 DNA SPEC QL NAA+PROBE: NEGATIVE
N GONORRHOEA DNA SPEC QL NAA+PROBE: NEGATIVE
SILVER BIRCH IGE QN: <0.1 KUA/L (ref ?–0.1)
WHITE ASH IGE QN: <0.1 KUA/L (ref ?–0.1)

## 2022-02-28 PROCEDURE — 95117 IMMUNOTHERAPY INJECTIONS: CPT | Performed by: ALLERGY & IMMUNOLOGY

## 2022-02-28 NOTE — TELEPHONE ENCOUNTER
RN called pt to notify her that yes it is okay that she receives her injections today after taking her antihistamines at 10:30am.

## 2022-02-28 NOTE — TELEPHONE ENCOUNTER
Patient states she took her antihistamine at 10:30am this morning and wants to know if she can come in for injection this afternoon.

## 2022-03-01 ENCOUNTER — LAB ENCOUNTER (OUTPATIENT)
Dept: LAB | Age: 52
End: 2022-03-01
Attending: OBSTETRICS & GYNECOLOGY
Payer: COMMERCIAL

## 2022-03-01 DIAGNOSIS — E55.9 VITAMIN D DEFICIENCY: ICD-10-CM

## 2022-03-01 DIAGNOSIS — M79.10 MYALGIA: ICD-10-CM

## 2022-03-01 DIAGNOSIS — M05.80 POLYARTHRITIS WITH POSITIVE RHEUMATOID FACTOR (HCC): ICD-10-CM

## 2022-03-01 DIAGNOSIS — Z00.00 ADULT GENERAL MEDICAL EXAM: ICD-10-CM

## 2022-03-01 DIAGNOSIS — R76.8 POSITIVE ANA (ANTINUCLEAR ANTIBODY): ICD-10-CM

## 2022-03-01 DIAGNOSIS — R82.90 BAD ODOR OF URINE: Primary | ICD-10-CM

## 2022-03-01 LAB
ALBUMIN SERPL-MCNC: 3.4 G/DL (ref 3.4–5)
ALP LIVER SERPL-CCNC: 82 U/L
ALT SERPL-CCNC: 26 U/L
ANION GAP SERPL CALC-SCNC: 5 MMOL/L (ref 0–18)
AST SERPL-CCNC: 18 U/L (ref 15–37)
BASOPHILS # BLD AUTO: 0.03 X10(3) UL (ref 0–0.2)
BASOPHILS NFR BLD AUTO: 0.7 %
BILIRUB SERPL-MCNC: 0.6 MG/DL (ref 0.1–2)
BILIRUB UR QL: NEGATIVE
BUN BLD-MCNC: 10 MG/DL (ref 7–18)
BUN/CREAT SERPL: 15.2 (ref 10–20)
CALCIUM BLD-MCNC: 8.6 MG/DL (ref 8.5–10.1)
CHLORIDE SERPL-SCNC: 107 MMOL/L (ref 98–112)
CHOLEST SERPL-MCNC: 221 MG/DL (ref ?–200)
CLARITY UR: CLEAR
CO2 SERPL-SCNC: 28 MMOL/L (ref 21–32)
COLOR UR: YELLOW
CREAT BLD-MCNC: 0.66 MG/DL
CRP SERPL-MCNC: <0.29 MG/DL (ref ?–0.3)
DEPRECATED RDW RBC AUTO: 44.2 FL (ref 35.1–46.3)
EOSINOPHIL # BLD AUTO: 0.14 X10(3) UL (ref 0–0.7)
EOSINOPHIL NFR BLD AUTO: 3.1 %
ERYTHROCYTE [DISTWIDTH] IN BLOOD BY AUTOMATED COUNT: 11.9 % (ref 11–15)
ERYTHROCYTE [SEDIMENTATION RATE] IN BLOOD: 14 MM/HR
FASTING PATIENT LIPID ANSWER: YES
FASTING STATUS PATIENT QL REPORTED: YES
GLOBULIN PLAS-MCNC: 3.7 G/DL (ref 2.8–4.4)
GLUCOSE BLD-MCNC: 87 MG/DL (ref 70–99)
GLUCOSE UR-MCNC: NEGATIVE MG/DL
HCT VFR BLD AUTO: 40.5 %
HDLC SERPL-MCNC: 63 MG/DL (ref 40–59)
HGB BLD-MCNC: 13.1 G/DL
IMM GRANULOCYTES # BLD AUTO: 0.02 X10(3) UL (ref 0–1)
IMM GRANULOCYTES NFR BLD: 0.4 %
KETONES UR-MCNC: NEGATIVE MG/DL
LDLC SERPL CALC-MCNC: 135 MG/DL (ref ?–100)
LEUKOCYTE ESTERASE UR QL STRIP.AUTO: NEGATIVE
LYMPHOCYTES # BLD AUTO: 1.22 X10(3) UL (ref 1–4)
LYMPHOCYTES NFR BLD AUTO: 27.4 %
MCH RBC QN AUTO: 32.8 PG (ref 26–34)
MCHC RBC AUTO-ENTMCNC: 32.3 G/DL (ref 31–37)
MCV RBC AUTO: 101.5 FL
MONOCYTES # BLD AUTO: 0.42 X10(3) UL (ref 0.1–1)
MONOCYTES NFR BLD AUTO: 9.4 %
NEUTROPHILS # BLD AUTO: 2.62 X10 (3) UL (ref 1.5–7.7)
NEUTROPHILS # BLD AUTO: 2.62 X10(3) UL (ref 1.5–7.7)
NEUTROPHILS NFR BLD AUTO: 59 %
NITRITE UR QL STRIP.AUTO: NEGATIVE
NONHDLC SERPL-MCNC: 158 MG/DL (ref ?–130)
OSMOLALITY SERPL CALC.SUM OF ELEC: 288 MOSM/KG (ref 275–295)
PH UR: 8 [PH] (ref 5–8)
PLATELET # BLD AUTO: 286 10(3)UL (ref 150–450)
POTASSIUM SERPL-SCNC: 4.1 MMOL/L (ref 3.5–5.1)
PROT SERPL-MCNC: 7.1 G/DL (ref 6.4–8.2)
PROT UR-MCNC: NEGATIVE MG/DL
RBC # BLD AUTO: 3.99 X10(6)UL
RHEUMATOID FACT SERPL-ACNC: 419 IU/ML (ref ?–15)
SODIUM SERPL-SCNC: 140 MMOL/L (ref 136–145)
SP GR UR STRIP: 1.01 (ref 1–1.03)
VIT D+METAB SERPL-MCNC: 27.6 NG/ML (ref 30–100)
VLDLC SERPL CALC-MCNC: 24 MG/DL (ref 0–30)
WBC # BLD AUTO: 4.5 X10(3) UL (ref 4–11)

## 2022-03-01 PROCEDURE — 36415 COLL VENOUS BLD VENIPUNCTURE: CPT

## 2022-03-01 PROCEDURE — 80061 LIPID PANEL: CPT

## 2022-03-01 PROCEDURE — 82306 VITAMIN D 25 HYDROXY: CPT

## 2022-03-01 PROCEDURE — 80053 COMPREHEN METABOLIC PANEL: CPT

## 2022-03-01 PROCEDURE — 85652 RBC SED RATE AUTOMATED: CPT

## 2022-03-01 PROCEDURE — 86038 ANTINUCLEAR ANTIBODIES: CPT

## 2022-03-01 PROCEDURE — 86039 ANTINUCLEAR ANTIBODIES (ANA): CPT

## 2022-03-01 PROCEDURE — 86431 RHEUMATOID FACTOR QUANT: CPT

## 2022-03-01 PROCEDURE — 85025 COMPLETE CBC W/AUTO DIFF WBC: CPT

## 2022-03-01 PROCEDURE — 86140 C-REACTIVE PROTEIN: CPT

## 2022-03-01 PROCEDURE — 81001 URINALYSIS AUTO W/SCOPE: CPT

## 2022-03-02 ENCOUNTER — TELEPHONE (OUTPATIENT)
Dept: OBGYN CLINIC | Facility: CLINIC | Age: 52
End: 2022-03-02

## 2022-03-02 LAB — T VAGINALIS RRNA SPEC QL NAA+PROBE: NEGATIVE

## 2022-03-03 LAB — NUCLEAR IGG TITR SER IF: POSITIVE {TITER}

## 2022-03-03 NOTE — TELEPHONE ENCOUNTER
Please schedule the following surgery:    Procedure: TLH/BS, cystoscopy    Date: 4/18 am    Diagnosis: heavy periods, recurrent pelvic pain, hx essure    Admission:23 hour observation    Anesth: general    Preop Medical Clearance needed:  Yes -- Dr. Huan PIERRE allergy: No    Additional Orders: routine orders    Additional equipment:  No    Rep needed: No    Additional surgery time needed: none    IPA tubal / hyst form signed: not applicable    Comments / Orders to Nurse: please schedule surgery while awaiting medical clearance    Discussed possible complications including but not limited to: Alternatives to surgical intervention discussed with patient in detail. , bleeding, infection, injury, bowel / bladder, injury, internal, injury, urethral, postop DVT / PE and surgery may not improve symptoms

## 2022-03-03 NOTE — TELEPHONE ENCOUNTER
Spoke to pt. Ruth surgery is scheduled on Mon,04/18/2022 at 9am.     She is aware that she needs medical clearance from PCP and its only valid for 30day. Advised her if clearance is not obtain by Friday before surgery we would have to cancel. Verbalized understandings    Will route to GLENNA to check if he is available to assist after his 730am case.     As listed below NO PA Needed for surgery    Major case and antimicrobial wash instructions sent via [employer]    Delayed staff message sent to MD to place pre-op orders    Entered in book and calender

## 2022-03-03 NOTE — TELEPHONE ENCOUNTER
Tree orders added to AIT chart. RN printed results of tree testing to go over with pt when she presents to office today for AIT injections. [place] tree still pending, but rest of tree testing has returned.

## 2022-03-04 LAB
ALLERGEN,  TREE IGE: <0.1 KU/L
ANA NUCLEOLAR TITR SER IF: 160 {TITER}

## 2022-03-07 ENCOUNTER — TELEPHONE (OUTPATIENT)
Dept: OBGYN CLINIC | Facility: CLINIC | Age: 52
End: 2022-03-07

## 2022-03-07 ENCOUNTER — NURSE ONLY (OUTPATIENT)
Dept: ALLERGY | Facility: CLINIC | Age: 52
End: 2022-03-07
Payer: COMMERCIAL

## 2022-03-07 DIAGNOSIS — J30.89 ENVIRONMENTAL AND SEASONAL ALLERGIES: ICD-10-CM

## 2022-03-07 PROCEDURE — 95117 IMMUNOTHERAPY INJECTIONS: CPT | Performed by: ALLERGY & IMMUNOLOGY

## 2022-03-07 NOTE — TELEPHONE ENCOUNTER
Pt calling has appt with [facility] tomorrow for [facility] and states she had genital warts removed by [facility] years ago and some have grown back. Pt asking if they can be removed tomorrow, pt informed appt time may not a lot enough time for both the [facility] and wart removal. Pt asking message be sent to [facility] as TADEO, that pt would like them removed. Pt states she does not need a call back regarding [facility] decision. Can discuss at visit[facility].

## 2022-03-07 NOTE — TELEPHONE ENCOUNTER
Dr. Jesus Alberto Garcia sent pt [employer] tree testing results. Results negative. Pt in office for AIT today, will let her know as well.

## 2022-03-08 ENCOUNTER — OFFICE VISIT (OUTPATIENT)
Dept: OBGYN CLINIC | Facility: CLINIC | Age: 52
End: 2022-03-08
Payer: COMMERCIAL

## 2022-03-08 VITALS
BODY MASS INDEX: 35 KG/M2 | HEART RATE: 75 BPM | DIASTOLIC BLOOD PRESSURE: 89 MMHG | SYSTOLIC BLOOD PRESSURE: 131 MMHG | WEIGHT: 203.63 LBS

## 2022-03-08 DIAGNOSIS — N92.0 EXCESSIVE OR FREQUENT MENSTRUATION: ICD-10-CM

## 2022-03-08 DIAGNOSIS — N92.1 MENORRHAGIA WITH IRREGULAR CYCLE: Primary | ICD-10-CM

## 2022-03-08 PROCEDURE — 3079F DIAST BP 80-89 MM HG: CPT | Performed by: OBSTETRICS & GYNECOLOGY

## 2022-03-08 PROCEDURE — 58100 BIOPSY OF UTERUS LINING: CPT | Performed by: OBSTETRICS & GYNECOLOGY

## 2022-03-08 PROCEDURE — 3075F SYST BP GE 130 - 139MM HG: CPT | Performed by: OBSTETRICS & GYNECOLOGY

## 2022-03-10 ENCOUNTER — NURSE ONLY (OUTPATIENT)
Dept: ALLERGY | Facility: CLINIC | Age: 52
End: 2022-03-10
Payer: COMMERCIAL

## 2022-03-10 DIAGNOSIS — J30.89 ENVIRONMENTAL AND SEASONAL ALLERGIES: ICD-10-CM

## 2022-03-10 PROCEDURE — 95117 IMMUNOTHERAPY INJECTIONS: CPT | Performed by: ALLERGY & IMMUNOLOGY

## 2022-03-10 NOTE — PROCEDURES
Endometrial Biopsy     Pre-Procedure Care:   Consent was obtained. Procedure/risks were explained. Questions were answered. Correct patient was identified. Correct side and site were confirmed. Birth control method(s) used:  essure    Indication: heavy periods    Pre-Medications: The patient was premedicated with Naproxen Sodium. Description of Procedure:   A bivalve speculum was placed in the vagina and the cervix was prepped with betadine solution. Single tooth tenaculum placed at the 12 o'clock position. The uterine cavity was sounded at 10 cm. The endometrial cavity was curetted for pipelle tissue sampling with 2 passes. Specimen was sent to pathology. The single tooth tenaculum was removed. Silver nitrate was applied at the site of tenaculum application   Good hemostasis was noted. There were no complications. There was no blood loss. Discharge instructions were provided to the patient. Visit Plan:  Plan for TLH/BS due to heavy periods / pelvic pain.

## 2022-03-14 ENCOUNTER — NURSE ONLY (OUTPATIENT)
Dept: ALLERGY | Facility: CLINIC | Age: 52
End: 2022-03-14
Payer: COMMERCIAL

## 2022-03-14 DIAGNOSIS — J30.89 ENVIRONMENTAL AND SEASONAL ALLERGIES: ICD-10-CM

## 2022-03-14 PROCEDURE — 95117 IMMUNOTHERAPY INJECTIONS: CPT | Performed by: ALLERGY & IMMUNOLOGY

## 2022-03-16 NOTE — TELEPHONE ENCOUNTER
Noted. Will route to [facility] to check if she would like me to have a surgical assist help with this case

## 2022-03-21 ENCOUNTER — NURSE ONLY (OUTPATIENT)
Dept: ALLERGY | Facility: CLINIC | Age: 52
End: 2022-03-21
Payer: COMMERCIAL

## 2022-03-21 DIAGNOSIS — J30.89 ENVIRONMENTAL AND SEASONAL ALLERGIES: ICD-10-CM

## 2022-03-21 PROCEDURE — 95117 IMMUNOTHERAPY INJECTIONS: CPT | Performed by: ALLERGY & IMMUNOLOGY

## 2022-03-21 PROCEDURE — 95165 ANTIGEN THERAPY SERVICES: CPT | Performed by: ALLERGY & IMMUNOLOGY

## 2022-03-24 ENCOUNTER — NURSE ONLY (OUTPATIENT)
Dept: ALLERGY | Facility: CLINIC | Age: 52
End: 2022-03-24
Payer: COMMERCIAL

## 2022-03-24 DIAGNOSIS — J30.89 ENVIRONMENTAL AND SEASONAL ALLERGIES: ICD-10-CM

## 2022-03-24 PROCEDURE — 95117 IMMUNOTHERAPY INJECTIONS: CPT | Performed by: ALLERGY & IMMUNOLOGY

## 2022-03-25 ENCOUNTER — PATIENT MESSAGE (OUTPATIENT)
Dept: FAMILY MEDICINE CLINIC | Facility: CLINIC | Age: 52
End: 2022-03-25

## 2022-03-26 NOTE — TELEPHONE ENCOUNTER
From: Cheri Cabrales  To: Nati Kimble DO  Sent: 3/25/2022 5:33 PM CDT  Subject: Knee and ankle swelling    Dr Shahram Campbell,    I have an injured knee so whenever I rehurt my knee it swells, then goes away. What is unusual is that my ankle has also swelled in the last couple of days on the same leg. I looked at the side effects of my blood pressure medicine Amlodipine and swelling is one of them. Please advise. Thank you.

## 2022-03-28 ENCOUNTER — NURSE ONLY (OUTPATIENT)
Dept: ALLERGY | Facility: CLINIC | Age: 52
End: 2022-03-28
Payer: COMMERCIAL

## 2022-03-28 DIAGNOSIS — J30.89 ENVIRONMENTAL AND SEASONAL ALLERGIES: ICD-10-CM

## 2022-03-28 PROCEDURE — 95117 IMMUNOTHERAPY INJECTIONS: CPT | Performed by: ALLERGY & IMMUNOLOGY

## 2022-03-31 ENCOUNTER — HOSPITAL ENCOUNTER (OUTPATIENT)
Dept: GENERAL RADIOLOGY | Age: 52
Discharge: HOME | End: 2022-03-31
Attending: FAMILY MEDICINE
Payer: COMMERCIAL

## 2022-03-31 ENCOUNTER — NURSE ONLY (OUTPATIENT)
Dept: ALLERGY | Facility: CLINIC | Age: 52
End: 2022-03-31
Payer: COMMERCIAL

## 2022-03-31 ENCOUNTER — OFFICE VISIT (OUTPATIENT)
Dept: FAMILY MEDICINE CLINIC | Facility: CLINIC | Age: 52
End: 2022-03-31
Payer: COMMERCIAL

## 2022-03-31 VITALS
TEMPERATURE: 99 F | DIASTOLIC BLOOD PRESSURE: 79 MMHG | SYSTOLIC BLOOD PRESSURE: 123 MMHG | BODY MASS INDEX: 35 KG/M2 | HEIGHT: 64 IN | WEIGHT: 205 LBS | HEART RATE: 91 BPM

## 2022-03-31 DIAGNOSIS — M17.11 PRIMARY OSTEOARTHRITIS OF RIGHT KNEE: ICD-10-CM

## 2022-03-31 DIAGNOSIS — J30.89 ENVIRONMENTAL AND SEASONAL ALLERGIES: ICD-10-CM

## 2022-03-31 DIAGNOSIS — M25.561 ACUTE PAIN OF RIGHT KNEE: ICD-10-CM

## 2022-03-31 DIAGNOSIS — M25.461 EFFUSION OF RIGHT KNEE: Primary | ICD-10-CM

## 2022-03-31 DIAGNOSIS — M25.552 LEFT HIP PAIN: ICD-10-CM

## 2022-03-31 DIAGNOSIS — M25.461 EFFUSION OF RIGHT KNEE: ICD-10-CM

## 2022-03-31 DIAGNOSIS — F41.9 ANXIETY: ICD-10-CM

## 2022-03-31 DIAGNOSIS — I10 ESSENTIAL HYPERTENSION: ICD-10-CM

## 2022-03-31 PROCEDURE — 95117 IMMUNOTHERAPY INJECTIONS: CPT | Performed by: ALLERGY & IMMUNOLOGY

## 2022-03-31 PROCEDURE — 73562 X-RAY EXAM OF KNEE 3: CPT | Performed by: FAMILY MEDICINE

## 2022-03-31 PROCEDURE — 99215 OFFICE O/P EST HI 40 MIN: CPT | Performed by: FAMILY MEDICINE

## 2022-03-31 PROCEDURE — 3078F DIAST BP <80 MM HG: CPT | Performed by: FAMILY MEDICINE

## 2022-03-31 PROCEDURE — 3074F SYST BP LT 130 MM HG: CPT | Performed by: FAMILY MEDICINE

## 2022-03-31 PROCEDURE — 3008F BODY MASS INDEX DOCD: CPT | Performed by: FAMILY MEDICINE

## 2022-03-31 RX ORDER — CELECOXIB 200 MG/1
200 CAPSULE ORAL DAILY
Qty: 30 CAPSULE | Refills: 0 | Status: SHIPPED | OUTPATIENT
Start: 2022-03-31

## 2022-04-04 ENCOUNTER — TELEPHONE (OUTPATIENT)
Dept: FAMILY MEDICINE CLINIC | Facility: CLINIC | Age: 52
End: 2022-04-04

## 2022-04-04 ENCOUNTER — NURSE ONLY (OUTPATIENT)
Dept: ALLERGY | Facility: CLINIC | Age: 52
End: 2022-04-04
Payer: COMMERCIAL

## 2022-04-04 DIAGNOSIS — J30.89 ENVIRONMENTAL AND SEASONAL ALLERGIES: ICD-10-CM

## 2022-04-04 PROCEDURE — 95117 IMMUNOTHERAPY INJECTIONS: CPT | Performed by: ALLERGY & IMMUNOLOGY

## 2022-04-04 NOTE — TELEPHONE ENCOUNTER
Patient calling to schedule a pre-op visit for hysterectomy scheduled on 4/18/2022 however there is no available appointments. Please advise.

## 2022-04-05 NOTE — TELEPHONE ENCOUNTER
1:30 PM this Friday. It will be a 15-minute visit for preoperative clearance as that is all I have open.

## 2022-04-08 ENCOUNTER — TELEPHONE (OUTPATIENT)
Dept: FAMILY MEDICINE CLINIC | Facility: CLINIC | Age: 52
End: 2022-04-08

## 2022-04-08 ENCOUNTER — OFFICE VISIT (OUTPATIENT)
Dept: FAMILY MEDICINE CLINIC | Facility: CLINIC | Age: 52
End: 2022-04-08
Payer: COMMERCIAL

## 2022-04-08 VITALS
DIASTOLIC BLOOD PRESSURE: 89 MMHG | BODY MASS INDEX: 35.17 KG/M2 | HEART RATE: 96 BPM | HEIGHT: 64 IN | WEIGHT: 206 LBS | SYSTOLIC BLOOD PRESSURE: 129 MMHG

## 2022-04-08 DIAGNOSIS — N92.0 MENORRHAGIA WITH REGULAR CYCLE: ICD-10-CM

## 2022-04-08 DIAGNOSIS — Z01.818 PREOP EXAMINATION: ICD-10-CM

## 2022-04-08 DIAGNOSIS — I10 ESSENTIAL HYPERTENSION: ICD-10-CM

## 2022-04-08 DIAGNOSIS — D25.9 UTERINE LEIOMYOMA, UNSPECIFIED LOCATION: Primary | ICD-10-CM

## 2022-04-08 DIAGNOSIS — M25.561 ACUTE PAIN OF RIGHT KNEE: ICD-10-CM

## 2022-04-08 PROCEDURE — 3008F BODY MASS INDEX DOCD: CPT | Performed by: FAMILY MEDICINE

## 2022-04-08 PROCEDURE — 3074F SYST BP LT 130 MM HG: CPT | Performed by: FAMILY MEDICINE

## 2022-04-08 PROCEDURE — 99243 OFF/OP CNSLTJ NEW/EST LOW 30: CPT | Performed by: FAMILY MEDICINE

## 2022-04-08 PROCEDURE — 3079F DIAST BP 80-89 MM HG: CPT | Performed by: FAMILY MEDICINE

## 2022-04-08 NOTE — TELEPHONE ENCOUNTER
Rashad Beltran saw [place]. She is approved for surgery and can proceed for the hysterectomy. Her blood pressure is controlled as she has no cardiac chest pain.     Rodrick Carrillo

## 2022-04-11 ENCOUNTER — NURSE ONLY (OUTPATIENT)
Dept: ALLERGY | Facility: CLINIC | Age: 52
End: 2022-04-11
Payer: COMMERCIAL

## 2022-04-11 ENCOUNTER — NURSE TRIAGE (OUTPATIENT)
Dept: FAMILY MEDICINE CLINIC | Facility: CLINIC | Age: 52
End: 2022-04-11

## 2022-04-11 DIAGNOSIS — J30.89 ENVIRONMENTAL AND SEASONAL ALLERGIES: ICD-10-CM

## 2022-04-11 PROCEDURE — 95165 ANTIGEN THERAPY SERVICES: CPT | Performed by: ALLERGY & IMMUNOLOGY

## 2022-04-11 PROCEDURE — 95117 IMMUNOTHERAPY INJECTIONS: CPT | Performed by: ALLERGY & IMMUNOLOGY

## 2022-04-14 ENCOUNTER — NURSE ONLY (OUTPATIENT)
Dept: ALLERGY | Facility: CLINIC | Age: 52
End: 2022-04-14
Payer: COMMERCIAL

## 2022-04-14 DIAGNOSIS — J30.89 ENVIRONMENTAL AND SEASONAL ALLERGIES: ICD-10-CM

## 2022-04-14 PROCEDURE — 95117 IMMUNOTHERAPY INJECTIONS: CPT | Performed by: ALLERGY & IMMUNOLOGY

## 2022-04-15 ENCOUNTER — LAB ENCOUNTER (OUTPATIENT)
Dept: LAB | Age: 52
End: 2022-04-15
Attending: OBSTETRICS & GYNECOLOGY
Payer: COMMERCIAL

## 2022-04-15 DIAGNOSIS — Z01.818 PRE-OP TESTING: ICD-10-CM

## 2022-04-15 LAB
ANTIBODY SCREEN: NEGATIVE
RH BLOOD TYPE: POSITIVE
RH BLOOD TYPE: POSITIVE

## 2022-04-15 PROCEDURE — 86850 RBC ANTIBODY SCREEN: CPT

## 2022-04-15 PROCEDURE — 86901 BLOOD TYPING SEROLOGIC RH(D): CPT

## 2022-04-15 PROCEDURE — 36415 COLL VENOUS BLD VENIPUNCTURE: CPT

## 2022-04-15 PROCEDURE — 86900 BLOOD TYPING SEROLOGIC ABO: CPT

## 2022-04-16 LAB — SARS-COV-2 RNA RESP QL NAA+PROBE: NOT DETECTED

## 2022-04-18 ENCOUNTER — ANESTHESIA EVENT (OUTPATIENT)
Dept: SURGERY | Facility: HOSPITAL | Age: 52
End: 2022-04-18
Payer: COMMERCIAL

## 2022-04-18 ENCOUNTER — HOSPITAL ENCOUNTER (OUTPATIENT)
Facility: HOSPITAL | Age: 52
Discharge: HOME | End: 2022-04-19
Attending: OBSTETRICS & GYNECOLOGY | Admitting: OBSTETRICS & GYNECOLOGY
Payer: COMMERCIAL

## 2022-04-18 ENCOUNTER — ANESTHESIA (OUTPATIENT)
Dept: SURGERY | Facility: HOSPITAL | Age: 52
End: 2022-04-18
Payer: COMMERCIAL

## 2022-04-18 DIAGNOSIS — R10.2 PELVIC PAIN: ICD-10-CM

## 2022-04-18 DIAGNOSIS — Z01.818 PRE-OP TESTING: Primary | ICD-10-CM

## 2022-04-18 DIAGNOSIS — N92.1 MENORRHAGIA WITH IRREGULAR CYCLE: ICD-10-CM

## 2022-04-18 LAB — B-HCG UR QL: NEGATIVE

## 2022-04-18 PROCEDURE — 0UT74ZZ RESECTION OF BILATERAL FALLOPIAN TUBES, PERCUTANEOUS ENDOSCOPIC APPROACH: ICD-10-PCS | Performed by: OBSTETRICS & GYNECOLOGY

## 2022-04-18 PROCEDURE — 0UT94ZZ RESECTION OF UTERUS, PERCUTANEOUS ENDOSCOPIC APPROACH: ICD-10-PCS | Performed by: OBSTETRICS & GYNECOLOGY

## 2022-04-18 PROCEDURE — 58573 TLH W/T/O UTERUS OVER 250 G: CPT | Performed by: OBSTETRICS & GYNECOLOGY

## 2022-04-18 RX ORDER — HYDROCODONE BITARTRATE AND ACETAMINOPHEN 5; 325 MG/1; MG/1
1 TABLET ORAL EVERY 6 HOURS PRN
Status: DISCONTINUED | OUTPATIENT
Start: 2022-04-18 | End: 2022-04-19

## 2022-04-18 RX ORDER — METOCLOPRAMIDE 10 MG/1
10 TABLET ORAL ONCE
Status: COMPLETED | OUTPATIENT
Start: 2022-04-18 | End: 2022-04-18

## 2022-04-18 RX ORDER — ONDANSETRON 2 MG/ML
4 INJECTION INTRAMUSCULAR; INTRAVENOUS EVERY 8 HOURS PRN
Status: DISCONTINUED | OUTPATIENT
Start: 2022-04-18 | End: 2022-04-19

## 2022-04-18 RX ORDER — MORPHINE SULFATE 4 MG/ML
4 INJECTION, SOLUTION INTRAMUSCULAR; INTRAVENOUS EVERY 10 MIN PRN
Status: DISCONTINUED | OUTPATIENT
Start: 2022-04-18 | End: 2022-04-18 | Stop reason: HOSPADM

## 2022-04-18 RX ORDER — ACETAMINOPHEN 500 MG
1000 TABLET ORAL ONCE
Status: COMPLETED | OUTPATIENT
Start: 2022-04-18 | End: 2022-04-18

## 2022-04-18 RX ORDER — HYDROMORPHONE HYDROCHLORIDE 1 MG/ML
0.4 INJECTION, SOLUTION INTRAMUSCULAR; INTRAVENOUS; SUBCUTANEOUS EVERY 5 MIN PRN
Status: DISCONTINUED | OUTPATIENT
Start: 2022-04-18 | End: 2022-04-18 | Stop reason: HOSPADM

## 2022-04-18 RX ORDER — ROCURONIUM BROMIDE 10 MG/ML
INJECTION, SOLUTION INTRAVENOUS AS NEEDED
Status: DISCONTINUED | OUTPATIENT
Start: 2022-04-18 | End: 2022-04-18 | Stop reason: SURG

## 2022-04-18 RX ORDER — DEXAMETHASONE SODIUM PHOSPHATE 4 MG/ML
VIAL (ML) INJECTION AS NEEDED
Status: DISCONTINUED | OUTPATIENT
Start: 2022-04-18 | End: 2022-04-18 | Stop reason: SURG

## 2022-04-18 RX ORDER — GLYCOPYRROLATE 0.2 MG/ML
INJECTION, SOLUTION INTRAMUSCULAR; INTRAVENOUS AS NEEDED
Status: DISCONTINUED | OUTPATIENT
Start: 2022-04-18 | End: 2022-04-18 | Stop reason: SURG

## 2022-04-18 RX ORDER — SODIUM CHLORIDE, SODIUM LACTATE, POTASSIUM CHLORIDE, CALCIUM CHLORIDE 600; 310; 30; 20 MG/100ML; MG/100ML; MG/100ML; MG/100ML
INJECTION, SOLUTION INTRAVENOUS CONTINUOUS
Status: DISCONTINUED | OUTPATIENT
Start: 2022-04-18 | End: 2022-04-19

## 2022-04-18 RX ORDER — LEVOTHYROXINE SODIUM 0.12 MG/1
125 TABLET ORAL
Status: DISCONTINUED | OUTPATIENT
Start: 2022-04-19 | End: 2022-04-19

## 2022-04-18 RX ORDER — PROCHLORPERAZINE EDISYLATE 5 MG/ML
5 INJECTION INTRAMUSCULAR; INTRAVENOUS ONCE AS NEEDED
Status: DISCONTINUED | OUTPATIENT
Start: 2022-04-18 | End: 2022-04-18 | Stop reason: HOSPADM

## 2022-04-18 RX ORDER — KETOROLAC TROMETHAMINE 30 MG/ML
INJECTION, SOLUTION INTRAMUSCULAR; INTRAVENOUS AS NEEDED
Status: DISCONTINUED | OUTPATIENT
Start: 2022-04-18 | End: 2022-04-18 | Stop reason: SURG

## 2022-04-18 RX ORDER — ONDANSETRON 2 MG/ML
INJECTION INTRAMUSCULAR; INTRAVENOUS AS NEEDED
Status: DISCONTINUED | OUTPATIENT
Start: 2022-04-18 | End: 2022-04-18 | Stop reason: SURG

## 2022-04-18 RX ORDER — HEPARIN SODIUM 5000 [USP'U]/ML
5000 INJECTION, SOLUTION INTRAVENOUS; SUBCUTANEOUS EVERY 12 HOURS SCHEDULED
Status: DISCONTINUED | OUTPATIENT
Start: 2022-04-18 | End: 2022-04-19

## 2022-04-18 RX ORDER — ONDANSETRON 2 MG/ML
4 INJECTION INTRAMUSCULAR; INTRAVENOUS ONCE AS NEEDED
Status: DISCONTINUED | OUTPATIENT
Start: 2022-04-18 | End: 2022-04-18 | Stop reason: HOSPADM

## 2022-04-18 RX ORDER — HYDROMORPHONE HYDROCHLORIDE 1 MG/ML
0.2 INJECTION, SOLUTION INTRAMUSCULAR; INTRAVENOUS; SUBCUTANEOUS EVERY 5 MIN PRN
Status: DISCONTINUED | OUTPATIENT
Start: 2022-04-18 | End: 2022-04-18 | Stop reason: HOSPADM

## 2022-04-18 RX ORDER — NEOSTIGMINE METHYLSULFATE 1 MG/ML
INJECTION INTRAVENOUS AS NEEDED
Status: DISCONTINUED | OUTPATIENT
Start: 2022-04-18 | End: 2022-04-18 | Stop reason: SURG

## 2022-04-18 RX ORDER — MORPHINE SULFATE 10 MG/ML
6 INJECTION, SOLUTION INTRAMUSCULAR; INTRAVENOUS EVERY 10 MIN PRN
Status: DISCONTINUED | OUTPATIENT
Start: 2022-04-18 | End: 2022-04-18 | Stop reason: HOSPADM

## 2022-04-18 RX ORDER — SODIUM CHLORIDE, SODIUM LACTATE, POTASSIUM CHLORIDE, CALCIUM CHLORIDE 600; 310; 30; 20 MG/100ML; MG/100ML; MG/100ML; MG/100ML
INJECTION, SOLUTION INTRAVENOUS CONTINUOUS
Status: DISCONTINUED | OUTPATIENT
Start: 2022-04-18 | End: 2022-04-18 | Stop reason: HOSPADM

## 2022-04-18 RX ORDER — CEFAZOLIN SODIUM/WATER 2 G/20 ML
2 SYRINGE (ML) INTRAVENOUS ONCE
Status: COMPLETED | OUTPATIENT
Start: 2022-04-18 | End: 2022-04-18

## 2022-04-18 RX ORDER — HYDROCODONE BITARTRATE AND ACETAMINOPHEN 5; 325 MG/1; MG/1
1 TABLET ORAL AS NEEDED
Status: DISCONTINUED | OUTPATIENT
Start: 2022-04-18 | End: 2022-04-18 | Stop reason: HOSPADM

## 2022-04-18 RX ORDER — HYDROCODONE BITARTRATE AND ACETAMINOPHEN 5; 325 MG/1; MG/1
2 TABLET ORAL AS NEEDED
Status: DISCONTINUED | OUTPATIENT
Start: 2022-04-18 | End: 2022-04-18 | Stop reason: HOSPADM

## 2022-04-18 RX ORDER — FAMOTIDINE 20 MG/1
20 TABLET, FILM COATED ORAL ONCE
Status: COMPLETED | OUTPATIENT
Start: 2022-04-18 | End: 2022-04-18

## 2022-04-18 RX ORDER — GABAPENTIN 300 MG/1
300 CAPSULE ORAL EVERY 8 HOURS PRN
Status: DISCONTINUED | OUTPATIENT
Start: 2022-04-18 | End: 2022-04-19

## 2022-04-18 RX ORDER — IBUPROFEN 600 MG/1
600 TABLET ORAL EVERY 6 HOURS SCHEDULED
Status: DISCONTINUED | OUTPATIENT
Start: 2022-04-19 | End: 2022-04-19

## 2022-04-18 RX ORDER — BUPIVACAINE HYDROCHLORIDE AND EPINEPHRINE 2.5; 5 MG/ML; UG/ML
INJECTION, SOLUTION INFILTRATION; PERINEURAL AS NEEDED
Status: DISCONTINUED | OUTPATIENT
Start: 2022-04-18 | End: 2022-04-18 | Stop reason: HOSPADM

## 2022-04-18 RX ORDER — HYDROMORPHONE HYDROCHLORIDE 1 MG/ML
0.6 INJECTION, SOLUTION INTRAMUSCULAR; INTRAVENOUS; SUBCUTANEOUS EVERY 5 MIN PRN
Status: DISCONTINUED | OUTPATIENT
Start: 2022-04-18 | End: 2022-04-18 | Stop reason: HOSPADM

## 2022-04-18 RX ORDER — ACETAMINOPHEN 500 MG
1000 TABLET ORAL EVERY 8 HOURS SCHEDULED
Status: DISCONTINUED | OUTPATIENT
Start: 2022-04-18 | End: 2022-04-19

## 2022-04-18 RX ORDER — MIDAZOLAM HYDROCHLORIDE 1 MG/ML
INJECTION INTRAMUSCULAR; INTRAVENOUS AS NEEDED
Status: DISCONTINUED | OUTPATIENT
Start: 2022-04-18 | End: 2022-04-18 | Stop reason: SURG

## 2022-04-18 RX ORDER — EPHEDRINE SULFATE 50 MG/ML
INJECTION, SOLUTION INTRAVENOUS AS NEEDED
Status: DISCONTINUED | OUTPATIENT
Start: 2022-04-18 | End: 2022-04-18 | Stop reason: SURG

## 2022-04-18 RX ORDER — AMLODIPINE BESYLATE 5 MG/1
5 TABLET ORAL DAILY
Status: DISCONTINUED | OUTPATIENT
Start: 2022-04-19 | End: 2022-04-19

## 2022-04-18 RX ORDER — ONDANSETRON 4 MG/1
4 TABLET, FILM COATED ORAL EVERY 8 HOURS PRN
Status: DISCONTINUED | OUTPATIENT
Start: 2022-04-18 | End: 2022-04-19

## 2022-04-18 RX ORDER — MORPHINE SULFATE 4 MG/ML
2 INJECTION, SOLUTION INTRAMUSCULAR; INTRAVENOUS EVERY 10 MIN PRN
Status: DISCONTINUED | OUTPATIENT
Start: 2022-04-18 | End: 2022-04-18 | Stop reason: HOSPADM

## 2022-04-18 RX ORDER — NALOXONE HYDROCHLORIDE 0.4 MG/ML
80 INJECTION, SOLUTION INTRAMUSCULAR; INTRAVENOUS; SUBCUTANEOUS AS NEEDED
Status: DISCONTINUED | OUTPATIENT
Start: 2022-04-18 | End: 2022-04-18 | Stop reason: HOSPADM

## 2022-04-18 RX ORDER — LIDOCAINE HYDROCHLORIDE 10 MG/ML
INJECTION, SOLUTION EPIDURAL; INFILTRATION; INTRACAUDAL; PERINEURAL AS NEEDED
Status: DISCONTINUED | OUTPATIENT
Start: 2022-04-18 | End: 2022-04-18 | Stop reason: SURG

## 2022-04-18 RX ORDER — KETOROLAC TROMETHAMINE 30 MG/ML
30 INJECTION, SOLUTION INTRAMUSCULAR; INTRAVENOUS EVERY 6 HOURS
Status: COMPLETED | OUTPATIENT
Start: 2022-04-18 | End: 2022-04-19

## 2022-04-18 RX ADMIN — NEOSTIGMINE METHYLSULFATE 3 MG: 1 INJECTION INTRAVENOUS at 10:05:00

## 2022-04-18 RX ADMIN — SODIUM CHLORIDE, SODIUM LACTATE, POTASSIUM CHLORIDE, CALCIUM CHLORIDE: 600; 310; 30; 20 INJECTION, SOLUTION INTRAVENOUS at 10:25:00

## 2022-04-18 RX ADMIN — MIDAZOLAM HYDROCHLORIDE 2 MG: 1 INJECTION INTRAMUSCULAR; INTRAVENOUS at 07:40:00

## 2022-04-18 RX ADMIN — ONDANSETRON 4 MG: 2 INJECTION INTRAMUSCULAR; INTRAVENOUS at 09:58:00

## 2022-04-18 RX ADMIN — LIDOCAINE HYDROCHLORIDE 50 MG: 10 INJECTION, SOLUTION EPIDURAL; INFILTRATION; INTRACAUDAL; PERINEURAL at 07:47:00

## 2022-04-18 RX ADMIN — DEXAMETHASONE SODIUM PHOSPHATE 4 MG: 4 MG/ML VIAL (ML) INJECTION at 07:55:00

## 2022-04-18 RX ADMIN — KETOROLAC TROMETHAMINE 30 MG: 30 INJECTION, SOLUTION INTRAMUSCULAR; INTRAVENOUS at 10:06:00

## 2022-04-18 RX ADMIN — EPHEDRINE SULFATE 10 MG: 50 INJECTION, SOLUTION INTRAVENOUS at 08:53:00

## 2022-04-18 RX ADMIN — CEFAZOLIN SODIUM/WATER 2 G: 2 G/20 ML SYRINGE (ML) INTRAVENOUS at 08:00:00

## 2022-04-18 RX ADMIN — GLYCOPYRROLATE 0.4 MG: 0.2 INJECTION, SOLUTION INTRAMUSCULAR; INTRAVENOUS at 10:05:00

## 2022-04-18 RX ADMIN — ROCURONIUM BROMIDE 10 MG: 10 INJECTION, SOLUTION INTRAVENOUS at 09:00:00

## 2022-04-18 RX ADMIN — ROCURONIUM BROMIDE 50 MG: 10 INJECTION, SOLUTION INTRAVENOUS at 07:47:00

## 2022-04-18 NOTE — INTERVAL H&P NOTE
Pre-op Diagnosis: Menorrhagia with irregular cycle [N92.1]  Pelvic pain [R10.2]    The above referenced H&P was reviewed by Elza Triplett MD on 4/18/2022, the patient was examined and no significant changes have occurred in the patient's condition since the H&P was performed. I discussed with the patient and/or legal representative the potential benefits, risks and side effects of this procedure; the likelihood of the patient achieving goals; and potential problems that might occur during recuperation. I discussed reasonable alternatives to the procedure, including risks, benefits and side effects related to the alternatives and risks related to not receiving this procedure. We will proceed with procedure as planned.

## 2022-04-18 NOTE — ANESTHESIA POSTPROCEDURE EVALUATION
Patient: Cecilio Cuevas    Procedure Summary     Date: 04/18/22 Room / Location: [address] [place] [address] MAIN OR 17 / [address] [place] [address] MAIN OR    Anesthesia Start: 0740 Anesthesia Stop: 4681    Procedures:       TOTAL LAPAROSCOPIC HYSTERECTOMY-BILATERAL SALPINGECTOMY, CYSTOSCOPY (N/A )      CYSTOSCOPY (N/A ) Diagnosis:       Menorrhagia with irregular cycle      Pelvic pain      (Menorrhagia with irregular cycle [F39. 1]Pelvic pain [R10.2])    Surgeons: Clarita Fabry, MD Anesthesiologist: Haritha Gambino MD    Anesthesia Type: general ASA Status: 2          Anesthesia Type: general    Vitals Value Taken Time   /63 04/18/22 1032   Temp 97.7 04/18/22 1032   Pulse 87 04/18/22 1032   Resp 16 04/18/22 1032   SpO2 96 04/18/22 1032       [facility] AN Post Evaluation:   Patient Evaluated in PACU  Patient Participation: complete - patient participated  Level of Consciousness: awake  Pain Management: adequate  Airway Patency:patent  Dental exam unchanged from preop  Yes    Cardiovascular Status: acceptable  Respiratory Status: acceptable  Postoperative Hydration acceptable      Brenna Hernandez MD  4/18/2022 10:32 AM

## 2022-04-18 NOTE — ANESTHESIA PROCEDURE NOTES
Airway  Date/Time: 4/18/2022 7:47 AM  Urgency: elective      General Information and Staff    Patient location during procedure: OR  Anesthesiologist: Stewart Miller MD  Resident/CRNA: Saskia Mckeon CRNA  Performed: CRNA     Indications and Patient Condition  Indications for airway management: anesthesia  Spontaneous ventilation: present  Preoxygenated: yes  Patient position: sniffing  Mask difficulty assessment: 2 - vent by mask + OA or adjuvant +/- NMBA    Final Airway Details  Final airway type: endotracheal airway      Successful airway: ETT  Cuffed: yes   Successful intubation technique: direct laryngoscopy  Facilitating devices/methods: intubating stylet  Blade: Christiana  Blade size: #3  ETT size (mm): 7.0    Cormack-Lehane Classification: grade I - full view of glottis  Placement verified by: chest auscultation   Cuff volume (mL): 6  Measured from: teeth  ETT to teeth (cm): 21  Number of attempts at approach: 1

## 2022-04-19 VITALS
DIASTOLIC BLOOD PRESSURE: 84 MMHG | HEART RATE: 75 BPM | BODY MASS INDEX: 34.66 KG/M2 | OXYGEN SATURATION: 99 % | RESPIRATION RATE: 18 BRPM | HEIGHT: 64 IN | WEIGHT: 203 LBS | SYSTOLIC BLOOD PRESSURE: 104 MMHG | TEMPERATURE: 98 F

## 2022-04-19 RX ORDER — HYDROCODONE BITARTRATE AND ACETAMINOPHEN 5; 325 MG/1; MG/1
1 TABLET ORAL EVERY 6 HOURS PRN
Qty: 15 TABLET | Refills: 0 | Status: SHIPPED | OUTPATIENT
Start: 2022-04-19

## 2022-04-19 NOTE — DISCHARGE SUMMARY
[facility]    Gyne Discharge Summary    Luz Cormier Patient Status:  Outpatient in a Bed    1970 MRN [id number]   Location [facility] Attending Isaiah Bergeron MD   Hosp Day # 0 PCP Jayne Cantu DO     Date of Admission: 2022     Date of Discharge: No discharge date for patient encounter. Admitting Diagnosis: Menorrhagia with irregular cycle [N92.1]  Pelvic pain [R10.2]    Discharge Diagnosis: .same, s/p TLH / BS  Disposition: Home    Hospital Course:   Reason for Admission: heavy periods, pelvic pain, hx essure    Discharge Physical Exam:   General appearance:  alert, appears stated age, cooperative and no distress  Pulmonary: nonlabored breathing  Cardiovascular: regular rate and rhythm  Abdominal: soft, non-tender; Incision: clean / dry / intact  Pelvic: deferred  Extremities: Homans sign is negative, no sign of DVT  Psychiatric: calm    Hospital Course: home KZM#5-9    Complications: None    Consults:     Surgeries:   Surgical Procedures     Case IDs Date Procedure Surgeon Location Status    [id number] 22 TOTAL LAPAROSCOPIC HYSTERECTOMY-BILATERAL SALPINGECTOMY, CYSTOSCOPY Isaiah Bergeron MD [facility] MAIN OR [facility]          Pending Labs:   Pending Labs     Order Current Status    Specimen to Pathology Tissue In process          Discharge Plan:   Discharge Condition: Stable    Discharge Meds: Current Discharge Medication List    Home Meds - Unchanged    celecoxib (CELEBREX) 200 MG Oral Cap  Take 1 capsule (200 mg total) by mouth daily. Take with food. This is for pain and inflammation. amLODIPine 5 MG Oral Tab  Take 1 tablet (5 mg total) by mouth daily. For high blood pressure. LEVOCETIRIZINE 5 MG Oral Tab  TAKE 1 TABLET(5 MG) BY MOUTH EVERY NIGHT    levothyroxine 125 MCG Oral Tab  Take 1 tablet (125 mcg total) by mouth daily.     albuterol (PROAIR HFA) 108 (90 Base) MCG/ACT Inhalation Aero Soln  Inhale 2 puffs into the lungs every 4 (four) hours as needed for Wheezing. Discharge Diet: General diet    Discharge Activity: pelvic rest x 3 months. No driving x 1-2 weeks. No swimming / soaking bath    Follow up: Follow-up Information     Brandy Solis MD In 4 weeks. Specialty: OBSTETRICS & GYNECOLOGY  Why: Follow up  Contact information:  [address] [place] [address]  [phone]                               Other Discharge Instructions:       No driving until off the narcotics as it can impair your ability to drive! Continue to walk, drink water  Pelvic rest for 3 weeks- no sex, douching  Follow up with Dr. Nadia Hill in 4 weeks    Alternate the following medications to keep pain under control  Motrin 600mg every 6 hours (last dose given at 11:00AM)   Norco 5-325mg every 6 hours(last dose given at 8:20AM) --- try to wean off the norcotic by cutting it in half. Then switch to Tylenol 650mg (325x2) or Tylenol Extra strength, 1,000mg (500x2) every 8 hours. Alternate Tylenol and Motrin to keep the pain under control, once off the Norco.  DO NOT take Tylenol and Norco together!! Norco already contains Tylenol.              Morenita Meyers MD  4/19/2022

## 2022-04-19 NOTE — CM/SW NOTE
04/19/22 1000   Discharge disposition   Expected discharge disposition Home or Self   Home services after discharge None   Discharge transportation Private car     Pt discussed during nursing rounds. Pt is stable for dc today. MD dc order entered. No home care needs identified on dc. Plan: Home today   / to remain available for support and/or discharge planning. Dilan Johnson.  Darnell Hardy RN, BSN  Nurse   [phone]

## 2022-04-25 ENCOUNTER — NURSE ONLY (OUTPATIENT)
Dept: ALLERGY | Facility: CLINIC | Age: 52
End: 2022-04-25
Payer: COMMERCIAL

## 2022-04-25 DIAGNOSIS — J30.89 ENVIRONMENTAL AND SEASONAL ALLERGIES: ICD-10-CM

## 2022-04-27 ENCOUNTER — PATIENT MESSAGE (OUTPATIENT)
Dept: OBGYN CLINIC | Facility: CLINIC | Age: 52
End: 2022-04-27

## 2022-04-27 ENCOUNTER — TELEPHONE (OUTPATIENT)
Dept: OBGYN CLINIC | Facility: CLINIC | Age: 52
End: 2022-04-27

## 2022-04-27 DIAGNOSIS — M25.561 ACUTE PAIN OF RIGHT KNEE: ICD-10-CM

## 2022-04-27 DIAGNOSIS — D50.8 OTHER IRON DEFICIENCY ANEMIA: ICD-10-CM

## 2022-04-27 DIAGNOSIS — M17.11 PRIMARY OSTEOARTHRITIS OF RIGHT KNEE: ICD-10-CM

## 2022-04-27 DIAGNOSIS — M25.461 EFFUSION OF RIGHT KNEE: ICD-10-CM

## 2022-04-27 RX ORDER — CELECOXIB 200 MG/1
200 CAPSULE ORAL
Qty: 30 CAPSULE | Refills: 0 | Status: SHIPPED | OUTPATIENT
Start: 2022-04-27 | End: 2022-10-25

## 2022-04-27 NOTE — TELEPHONE ENCOUNTER
Jonny Donald 105 4/18/22. Patient calling to state she only took Norco x 2-3 days post hysterectomy. She has slowly weaned off of ibuprofen and tylenol. States doing well, pain is down to a 1 or 2/10. She had a home inspection yesterday and thinks she overdid it cleaning her house, because she's in a little more pain today. Denies drainage, warmth or swelling at incision sites. Denies new vaginal bleeding. She has been resting and increasing protein intake to support healing. She notes there is some redness beneath her belly button, but thinks it might have always been a little red. States thinks may just be irritated from being up and moving around all day. States her belly \"jiggles\" and that irritates incisions. She is going to send picture in [place] for our opinion. Directed patient to watch for signs of infection. Increase hydration. Continue to rest.     Patient asking if she may resume Celebrex for knee pain at this time? 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 To [facility] to advise.

## 2022-04-27 NOTE — TELEPHONE ENCOUNTER
Naga Mcdermott, called regarding the medication  Celecoxib 200 mg can she take it once a day instead of taking the medication that was prescribed for surgery. .. she request a nurse to call her

## 2022-04-27 NOTE — TELEPHONE ENCOUNTER
Refill passed per [facility], [facility] protocol. Requested Prescriptions   Pending Prescriptions Disp Refills    CELECOXIB 200 MG Oral Cap [Pharmacy Med Name: CELECOXIB 200MG CAPSULES] 30 capsule 0     Sig: TAKE 1 CAPSULE(200 MG) BY MOUTH DAILY WITH FOOD FOR PAIN AND INFLAMMATION        Non-Narcotic Pain Medication Protocol Passed - 4/27/2022  5:07 PM        Passed - Appointment in past 6 or next 3 months              Future Appointments         Provider Department Appt Notes    Tomorrow [address], [address] [place] Injection    4/26/22. Monica Florian Pt recently made a payment on 4/14/22.     In 5 days [address], [address] [place] [facility] Injection    In 1 week [address], [address] [place] [facility] Injection    In 1 week [address], [address] [place] [facility] Injection    In 2 weeks Deysi Patel MD [facility] Health, [address] post op             Recent Outpatient Visits              2 days ago Environmental and seasonal allergies    [facility], [facility], [address]    Nurse Only    1 week ago Environmental and seasonal allergies    [facility], [facility], [address]    Nurse Only    2 weeks ago Environmental and seasonal allergies    [facility], [facility], [address]    Nurse Only    2 weeks ago Uterine leiomyoma, unspecified location    [facility], [facility], [address], [address], [place],     Office Visit    3 weeks ago Environmental and seasonal allergies    [facility], [facility], [address]    Nurse Only

## 2022-04-28 ENCOUNTER — OFFICE VISIT (OUTPATIENT)
Dept: OBGYN CLINIC | Facility: CLINIC | Age: 52
End: 2022-04-28
Payer: COMMERCIAL

## 2022-04-28 ENCOUNTER — NURSE ONLY (OUTPATIENT)
Dept: ALLERGY | Facility: CLINIC | Age: 52
End: 2022-04-28
Payer: COMMERCIAL

## 2022-04-28 VITALS
BODY MASS INDEX: 36 KG/M2 | DIASTOLIC BLOOD PRESSURE: 84 MMHG | WEIGHT: 207.19 LBS | HEART RATE: 105 BPM | SYSTOLIC BLOOD PRESSURE: 124 MMHG

## 2022-04-28 DIAGNOSIS — J30.89 ENVIRONMENTAL AND SEASONAL ALLERGIES: ICD-10-CM

## 2022-04-28 DIAGNOSIS — T81.49XA POSTOPERATIVE WOUND CELLULITIS: Primary | ICD-10-CM

## 2022-04-28 PROCEDURE — 99212 OFFICE O/P EST SF 10 MIN: CPT | Performed by: OBSTETRICS & GYNECOLOGY

## 2022-04-28 PROCEDURE — 3079F DIAST BP 80-89 MM HG: CPT | Performed by: OBSTETRICS & GYNECOLOGY

## 2022-04-28 PROCEDURE — 3074F SYST BP LT 130 MM HG: CPT | Performed by: OBSTETRICS & GYNECOLOGY

## 2022-04-28 RX ORDER — CEPHALEXIN 500 MG/1
500 CAPSULE ORAL 4 TIMES DAILY
Qty: 28 CAPSULE | Refills: 0 | Status: SHIPPED | OUTPATIENT
Start: 2022-04-28 | End: 2022-05-05

## 2022-04-28 RX ORDER — ERGOCALCIFEROL 1.25 MG/1
CAPSULE ORAL
Qty: 12 CAPSULE | Refills: 3 | Status: SHIPPED | OUTPATIENT
Start: 2022-04-28

## 2022-04-28 NOTE — TELEPHONE ENCOUNTER
Pt called and offered appt with NJG today at 110 pm at [facility]. Pt accepts appt. Pt states she has noted the redness below her belly button is warm to touch now and appears slightly larger. Pt denies any body aches, chills or fever.

## 2022-04-28 NOTE — TELEPHONE ENCOUNTER
REUBENTCMARITZA and sent MyChart. Pt sent pictures regarding redness to incision. Calling to offer pt appt to be seen with TAI.

## 2022-04-28 NOTE — TELEPHONE ENCOUNTER
From: Thania Howard  To: Jackson Medina MD  Sent: 4/27/2022 8:37 PM CDT  Subject: Pictures of Red around my incisions    Per conversation with the nurse, here are the pics of my incisions with redness. I think they are normal but sending to you to look at just in case. Thank you.

## 2022-04-28 NOTE — TELEPHONE ENCOUNTER
Informed pt that [address] [facility] stated no concern on using celebrex. 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 Pt stated understanding.

## 2022-05-02 ENCOUNTER — TELEPHONE (OUTPATIENT)
Dept: OBGYN CLINIC | Facility: CLINIC | Age: 52
End: 2022-05-02

## 2022-05-02 NOTE — TELEPHONE ENCOUNTER
Patient calling with L pelvic pain. Jonny Donald 105 4/18/22. In office 4/28/22 for incision check for redness and warmth of umbilicus and upper left incision. On Keflex 500 mg QID x 7 days. Today reporting sharp, shooting pain to left pelvis, started around 3 am. No pattern, denies correlation with movement. She is experiencing twinges regardless of position, but states pain is improved with position changes. Rating pain a 3 or 4/10 pain. Carried some gallons of water yesterday while grocery shopping. She denies fever or chills, VB. Denies UTI symptoms. Taking celebrex daily. Redness and warmth of incisions improving daily on Keflex. Directed patient to monitor since pain is not severe and no other symptoms. Push fluids and continue celebrex. Avoid overexertion with lifting. To NJG- patient would like your opinion if this sounds like normal healing or any concern for internal injury.

## 2022-05-02 NOTE — TELEPHONE ENCOUNTER
Informed pt that NJG stated she is still in acute surgical recovery. Made aware she had major surery and needs to treat herself like major surgery was done. Informed pt she should not be carry gallon of water and grocery shopping. Pt states she will ask her children to help her. Pt will follow up with us tomorrow.

## 2022-05-02 NOTE — TELEPHONE ENCOUNTER
Why was she carrying gallon of water grocery shopping???? She is still in acute surgerical recovery phase! Please have pt take it easy & call us with update tomorrow morning. Pt has had major surgery done & needs to treat herself like major surgery was done.

## 2022-05-02 NOTE — TELEPHONE ENCOUNTER
Pt is calling had surgery 2 weeks ago and is having sharp left side pain ,  This started last night about 4 ,

## 2022-05-03 RX ORDER — LIDOCAINE HYDROCHLORIDE 20 MG/ML
SOLUTION ORAL; TOPICAL
Qty: 90 TABLET | Refills: 1 | OUTPATIENT
Start: 2022-05-03

## 2022-05-05 ENCOUNTER — NURSE ONLY (OUTPATIENT)
Dept: ALLERGY | Facility: CLINIC | Age: 52
End: 2022-05-05
Payer: COMMERCIAL

## 2022-05-05 DIAGNOSIS — J30.89 ENVIRONMENTAL AND SEASONAL ALLERGIES: ICD-10-CM

## 2022-05-05 PROCEDURE — 95117 IMMUNOTHERAPY INJECTIONS: CPT | Performed by: ALLERGY & IMMUNOLOGY

## 2022-05-06 ENCOUNTER — TELEPHONE (OUTPATIENT)
Dept: OBGYN CLINIC | Facility: CLINIC | Age: 52
End: 2022-05-06

## 2022-05-06 NOTE — TELEPHONE ENCOUNTER
Patient called in she states Dr Omar Adan want her to see him Monday or Tuesday ER Follow . Jose Rodriguez ... no appt are available

## 2022-05-06 NOTE — TELEPHONE ENCOUNTER
See below. Pt states the pain has improved from a few days ago. States she only has the increased activity that one day. States she is resting a lot. Reports some pain to left lower side but it does not last long and is mainly with movement. Denies constipation and dysuria. Advised pt to monitor and if pain becomes severe then pt to call back. Pt has post op appt on 5/13.

## 2022-05-09 ENCOUNTER — NURSE ONLY (OUTPATIENT)
Dept: ALLERGY | Facility: CLINIC | Age: 52
End: 2022-05-09
Payer: COMMERCIAL

## 2022-05-09 DIAGNOSIS — J30.89 ENVIRONMENTAL AND SEASONAL ALLERGIES: ICD-10-CM

## 2022-05-12 ENCOUNTER — NURSE ONLY (OUTPATIENT)
Dept: ALLERGY | Facility: CLINIC | Age: 52
End: 2022-05-12
Payer: COMMERCIAL

## 2022-05-12 DIAGNOSIS — J30.89 ENVIRONMENTAL AND SEASONAL ALLERGIES: ICD-10-CM

## 2022-05-12 PROCEDURE — 95117 IMMUNOTHERAPY INJECTIONS: CPT | Performed by: ALLERGY & IMMUNOLOGY

## 2022-05-13 ENCOUNTER — OFFICE VISIT (OUTPATIENT)
Dept: OBGYN CLINIC | Facility: CLINIC | Age: 52
End: 2022-05-13
Payer: COMMERCIAL

## 2022-05-13 VITALS
HEART RATE: 108 BPM | BODY MASS INDEX: 35 KG/M2 | SYSTOLIC BLOOD PRESSURE: 125 MMHG | WEIGHT: 201.63 LBS | DIASTOLIC BLOOD PRESSURE: 84 MMHG

## 2022-05-13 DIAGNOSIS — Z09 POSTOP CHECK: Primary | ICD-10-CM

## 2022-05-13 PROCEDURE — 3074F SYST BP LT 130 MM HG: CPT | Performed by: OBSTETRICS & GYNECOLOGY

## 2022-05-13 PROCEDURE — 3079F DIAST BP 80-89 MM HG: CPT | Performed by: OBSTETRICS & GYNECOLOGY

## 2022-05-13 PROCEDURE — 99024 POSTOP FOLLOW-UP VISIT: CPT | Performed by: OBSTETRICS & GYNECOLOGY

## 2022-05-16 ENCOUNTER — NURSE ONLY (OUTPATIENT)
Dept: ALLERGY | Facility: CLINIC | Age: 52
End: 2022-05-16
Payer: COMMERCIAL

## 2022-05-16 DIAGNOSIS — J30.89 ENVIRONMENTAL AND SEASONAL ALLERGIES: ICD-10-CM

## 2022-05-19 ENCOUNTER — NURSE ONLY (OUTPATIENT)
Dept: ALLERGY | Facility: CLINIC | Age: 52
End: 2022-05-19
Payer: COMMERCIAL

## 2022-05-19 DIAGNOSIS — J30.89 ENVIRONMENTAL AND SEASONAL ALLERGIES: ICD-10-CM

## 2022-05-19 PROCEDURE — 95117 IMMUNOTHERAPY INJECTIONS: CPT | Performed by: ALLERGY & IMMUNOLOGY

## 2022-05-23 ENCOUNTER — NURSE ONLY (OUTPATIENT)
Dept: ALLERGY | Facility: CLINIC | Age: 52
End: 2022-05-23
Payer: COMMERCIAL

## 2022-05-23 DIAGNOSIS — J30.89 ENVIRONMENTAL AND SEASONAL ALLERGIES: ICD-10-CM

## 2022-05-23 RX ORDER — LEVOCETIRIZINE DIHYDROCHLORIDE 5 MG/1
TABLET, FILM COATED ORAL
Qty: 90 TABLET | Refills: 0 | Status: SHIPPED | OUTPATIENT
Start: 2022-05-23

## 2022-05-26 ENCOUNTER — NURSE ONLY (OUTPATIENT)
Dept: ALLERGY | Facility: CLINIC | Age: 52
End: 2022-05-26
Payer: COMMERCIAL

## 2022-05-26 DIAGNOSIS — I10 ESSENTIAL HYPERTENSION: ICD-10-CM

## 2022-05-26 DIAGNOSIS — J30.89 ENVIRONMENTAL AND SEASONAL ALLERGIES: ICD-10-CM

## 2022-05-26 RX ORDER — AMLODIPINE BESYLATE 5 MG/1
5 TABLET ORAL DAILY
Qty: 90 TABLET | Refills: 1 | Status: SHIPPED | OUTPATIENT
Start: 2022-05-26 | End: 2023-01-03

## 2022-05-26 NOTE — TELEPHONE ENCOUNTER
Refill passed per [facility] protocol.       Requested Prescriptions   Pending Prescriptions Disp Refills    AMLODIPINE 5 MG Oral Tab [Pharmacy Med Name: AMLODIPINE BESYLATE 5MG TABLETS] 90 tablet 1     Sig: TAKE 1 TABLET(5 MG) BY MOUTH DAILY FOR HIGH BLOOD PRESSURE        Hypertensive Medications Protocol Passed - 5/26/2022 12:09 PM        Passed - CMP or BMP in past 12 months        Passed - Appointment in past 6 or next 3 months        Passed - GFR Non- > 50     Lab Results   Component Value Date    [facility] 103 03/01/2022                        Future Appointments         Provider Department Appt Notes    In 1 week [address], [address] Allergy Injection    In 1 week [address], [address] [place] [facility] Injection    In 2 weeks [address], [address] [place] Allergy Injection            Recent Outpatient Visits              Today Environmental and seasonal allergies    [facility], [address]    Nurse Only    3 days ago Environmental and seasonal allergies    [facility], [address]    Nurse Only    1 week ago Environmental and seasonal allergies    [facility], [address]    Nurse Only    1 week ago Environmental and seasonal allergies    [facility], [address] Only    1 week ago Postop check    [facility] for [address] [place] [address], [place] Shanna Bridges MD    Office Visit

## 2022-06-02 ENCOUNTER — NURSE ONLY (OUTPATIENT)
Dept: ALLERGY | Facility: CLINIC | Age: 52
End: 2022-06-02
Payer: COMMERCIAL

## 2022-06-02 DIAGNOSIS — J30.89 ENVIRONMENTAL AND SEASONAL ALLERGIES: ICD-10-CM

## 2022-06-02 PROCEDURE — 95117 IMMUNOTHERAPY INJECTIONS: CPT | Performed by: ALLERGY & IMMUNOLOGY

## 2022-06-06 ENCOUNTER — NURSE ONLY (OUTPATIENT)
Dept: ALLERGY | Facility: CLINIC | Age: 52
End: 2022-06-06
Payer: COMMERCIAL

## 2022-06-06 DIAGNOSIS — J30.89 ENVIRONMENTAL AND SEASONAL ALLERGIES: ICD-10-CM

## 2022-06-20 ENCOUNTER — NURSE ONLY (OUTPATIENT)
Dept: ALLERGY | Facility: CLINIC | Age: 52
End: 2022-06-20
Payer: COMMERCIAL

## 2022-06-20 DIAGNOSIS — J30.89 ENVIRONMENTAL AND SEASONAL ALLERGIES: ICD-10-CM

## 2022-06-23 ENCOUNTER — NURSE ONLY (OUTPATIENT)
Dept: ALLERGY | Facility: CLINIC | Age: 52
End: 2022-06-23
Payer: COMMERCIAL

## 2022-06-23 DIAGNOSIS — J30.89 ENVIRONMENTAL AND SEASONAL ALLERGIES: ICD-10-CM

## 2022-06-23 PROCEDURE — 95117 IMMUNOTHERAPY INJECTIONS: CPT | Performed by: ALLERGY & IMMUNOLOGY

## 2022-06-29 ENCOUNTER — NURSE ONLY (OUTPATIENT)
Dept: ALLERGY | Facility: CLINIC | Age: 52
End: 2022-06-29
Payer: COMMERCIAL

## 2022-06-29 ENCOUNTER — TELEPHONE (OUTPATIENT)
Dept: ALLERGY | Facility: CLINIC | Age: 52
End: 2022-06-29

## 2022-06-29 NOTE — TELEPHONE ENCOUNTER
Patient presented to office for routine AIT injections. Pt reports that she took a large dose of Niacin supplements along with her blood pressure medication at 1430. Pt reports feels flushed and dizzy. RN advised that pt sit down in chair so she does not fall. Water offered to pt and encouraged she continue to drink it. Dr. Joaquina Norman notified and he advised that she stay for monitoring and continue to drink fluids. Pt deferred benadryl. Allergy shots were not given today to be safe and were rescheduled for a later date. However, pt stayed in office for 60 minutes for monitoring. Pt continued to drink water and was alert and oriented X4. Pt rested comfortably in chair while RN monitored. At 1600 pt reported that she felt much better and wanted to discharge home. Vitals at 1600:  BP: 136/97   HR: 72  SPO2: 100%    Per Dr. Joaquina Norman pt is okay to discharge home as she reports that she feels much better and vitals are within normal limits. Hyperglycemia

## 2022-06-29 NOTE — PROGRESS NOTES
Patient presented to office for routine AIT injections. Pt reports that she took a large dose of Niacin supplements along with her blood pressure medication at 1430. Pt reports feels flushed and dizzy. RN advised that pt sit down in chair so she does not fall. Water offered to pt and encouraged she continue to drink it. Dr. Puente Speak notified and he advised that she stay for monitoring and continue to drink fluids. Pt deferred benadryl. Allergy shots were not given today to be safe and were rescheduled for a later date. However, pt stayed in office for 60 minutes for monitoring. Pt continued to drink water and was alert and oriented X4. Pt rested comfortably in chair while RN monitored. At 1600 pt reported that she felt much better and wanted to discharge home. Vitals at 1600:  BP: 136/97   HR: 72  SPO2: 100%    Per Dr. Cindi Huitron pt is okay to discharge home as she reports that she feels much better and vitals are within normal limits.

## 2022-06-30 NOTE — TELEPHONE ENCOUNTER
Reviewed with patient to touch base with her PCP if she should be continuing niacin at all. Reviewed potential side effects of niacin including flushing and low blood pressure. Patient did not receive immunotherapy today given her side effects from niacin and taking her blood pressure medication.    Advised to follow-up with PCP or urgent care if symptoms return or persist

## 2022-07-12 ENCOUNTER — NURSE ONLY (OUTPATIENT)
Dept: ALLERGY | Facility: CLINIC | Age: 52
End: 2022-07-12
Payer: COMMERCIAL

## 2022-07-12 DIAGNOSIS — J30.89 ENVIRONMENTAL AND SEASONAL ALLERGIES: ICD-10-CM

## 2022-07-12 PROCEDURE — 95117 IMMUNOTHERAPY INJECTIONS: CPT | Performed by: ALLERGY & IMMUNOLOGY

## 2022-07-18 ENCOUNTER — NURSE ONLY (OUTPATIENT)
Dept: ALLERGY | Facility: CLINIC | Age: 52
End: 2022-07-18
Payer: COMMERCIAL

## 2022-07-18 DIAGNOSIS — J30.89 ENVIRONMENTAL AND SEASONAL ALLERGIES: ICD-10-CM

## 2022-07-26 ENCOUNTER — NURSE ONLY (OUTPATIENT)
Dept: ALLERGY | Facility: CLINIC | Age: 52
End: 2022-07-26
Payer: COMMERCIAL

## 2022-07-26 DIAGNOSIS — J30.89 ENVIRONMENTAL AND SEASONAL ALLERGIES: ICD-10-CM

## 2022-07-26 PROCEDURE — 95117 IMMUNOTHERAPY INJECTIONS: CPT | Performed by: ALLERGY & IMMUNOLOGY

## 2022-08-01 ENCOUNTER — NURSE ONLY (OUTPATIENT)
Dept: ALLERGY | Facility: CLINIC | Age: 52
End: 2022-08-01
Payer: COMMERCIAL

## 2022-08-01 DIAGNOSIS — J30.89 ENVIRONMENTAL AND SEASONAL ALLERGIES: ICD-10-CM

## 2022-08-05 ENCOUNTER — TELEPHONE (OUTPATIENT)
Dept: FAMILY MEDICINE CLINIC | Facility: CLINIC | Age: 52
End: 2022-08-05

## 2022-08-05 NOTE — TELEPHONE ENCOUNTER
Patient looking for referral information she was given by [facility] navigator. She states it was sent to her mychart but can't find it. Referral for [facility] navigator generated in FEB. I don't see specific therapists that was emailed to her from the Navigator. She can call [phone] to assist her.

## 2022-08-15 ENCOUNTER — NURSE ONLY (OUTPATIENT)
Dept: ALLERGY | Facility: CLINIC | Age: 52
End: 2022-08-15
Payer: COMMERCIAL

## 2022-08-15 DIAGNOSIS — J30.89 ENVIRONMENTAL AND SEASONAL ALLERGIES: ICD-10-CM

## 2022-08-29 ENCOUNTER — NURSE ONLY (OUTPATIENT)
Dept: ALLERGY | Facility: CLINIC | Age: 52
End: 2022-08-29
Payer: COMMERCIAL

## 2022-08-29 ENCOUNTER — OFFICE VISIT (OUTPATIENT)
Dept: ALLERGY | Facility: CLINIC | Age: 52
End: 2022-08-29
Payer: COMMERCIAL

## 2022-08-29 VITALS
SYSTOLIC BLOOD PRESSURE: 152 MMHG | OXYGEN SATURATION: 99 % | DIASTOLIC BLOOD PRESSURE: 106 MMHG | RESPIRATION RATE: 18 BRPM | HEART RATE: 69 BPM

## 2022-08-29 DIAGNOSIS — J30.2 PERENNIAL ALLERGIC RHINITIS WITH SEASONAL VARIATION: Primary | ICD-10-CM

## 2022-08-29 DIAGNOSIS — J98.01 BRONCHOSPASM: ICD-10-CM

## 2022-08-29 DIAGNOSIS — Z92.29 COVID-19 VACCINE SERIES COMPLETED: ICD-10-CM

## 2022-08-29 DIAGNOSIS — J30.89 PERENNIAL ALLERGIC RHINITIS WITH SEASONAL VARIATION: Primary | ICD-10-CM

## 2022-08-29 DIAGNOSIS — J30.89 ENVIRONMENTAL AND SEASONAL ALLERGIES: ICD-10-CM

## 2022-08-29 PROCEDURE — 95165 ANTIGEN THERAPY SERVICES: CPT | Performed by: ALLERGY & IMMUNOLOGY

## 2022-08-29 PROCEDURE — 95117 IMMUNOTHERAPY INJECTIONS: CPT | Performed by: ALLERGY & IMMUNOLOGY

## 2022-08-29 PROCEDURE — 3080F DIAST BP >= 90 MM HG: CPT | Performed by: ALLERGY & IMMUNOLOGY

## 2022-08-29 PROCEDURE — 3077F SYST BP >= 140 MM HG: CPT | Performed by: ALLERGY & IMMUNOLOGY

## 2022-08-29 PROCEDURE — 99214 OFFICE O/P EST MOD 30 MIN: CPT | Performed by: ALLERGY & IMMUNOLOGY

## 2022-09-19 ENCOUNTER — NURSE ONLY (OUTPATIENT)
Dept: ALLERGY | Facility: CLINIC | Age: 52
End: 2022-09-19
Payer: COMMERCIAL

## 2022-09-19 DIAGNOSIS — J30.89 ENVIRONMENTAL AND SEASONAL ALLERGIES: ICD-10-CM

## 2022-10-06 ENCOUNTER — TELEPHONE (OUTPATIENT)
Dept: ALLERGY | Facility: CLINIC | Age: 52
End: 2022-10-06

## 2022-10-06 NOTE — TELEPHONE ENCOUNTER
Patient is calling to rescheduling allergy injection appointment. Patient states she can do any other day but Monday.

## 2022-10-06 NOTE — TELEPHONE ENCOUNTER
Spoke with patient. verified name and . Patient states she needs to reschedule her allergy injections from Monday,10/10/22 to Tuesday,10/11/22. Patient is scheduled Tuesday,10/11/22 at 1:50 pm, no further questions at this time.

## 2022-10-11 ENCOUNTER — NURSE ONLY (OUTPATIENT)
Dept: ALLERGY | Facility: CLINIC | Age: 52
End: 2022-10-11
Payer: COMMERCIAL

## 2022-10-11 DIAGNOSIS — J30.89 ENVIRONMENTAL AND SEASONAL ALLERGIES: ICD-10-CM

## 2022-10-11 PROCEDURE — 95117 IMMUNOTHERAPY INJECTIONS: CPT | Performed by: ALLERGY & IMMUNOLOGY

## 2022-10-24 ENCOUNTER — TELEPHONE (OUTPATIENT)
Dept: ALLERGY | Facility: CLINIC | Age: 52
End: 2022-10-24

## 2022-10-24 ENCOUNTER — LAB ENCOUNTER (OUTPATIENT)
Dept: LAB | Facility: HOSPITAL | Age: 52
End: 2022-10-24
Attending: FAMILY MEDICINE
Payer: COMMERCIAL

## 2022-10-24 DIAGNOSIS — R06.00 DYSPNEA, UNSPECIFIED TYPE: Primary | ICD-10-CM

## 2022-10-24 DIAGNOSIS — R06.00 DYSPNEA, UNSPECIFIED TYPE: ICD-10-CM

## 2022-10-24 NOTE — TELEPHONE ENCOUNTER
Patient state dealing with a lot of asthma and shortness of breathe due to the allergies. Declined to speak with PCP, want to speak with [facility] office due to allergies.

## 2022-10-24 NOTE — TELEPHONE ENCOUNTER
Call reviewed and noted. Agree with triage advice provided. If patient concerns for potential mold in her condominium that she should speak with her landlord to request an evaluation for potential mold or allergens.    I am unable to sign off any forms unless we have some type of evidence or proof that patient is being exposed to allergens in question

## 2022-10-24 NOTE — TELEPHONE ENCOUNTER
Verified name and . Patient calling to follow up on phone call with allergist as seen below. Patient requested COVID-19 test for symptoms as seen below. COVID-19 test ordered per protocol.     Virtual visit scheduled:  Future Appointments   Date Time Provider Maeve Aimee   10/25/2022  1:30 PM Yaz Alberts DO [facility]   11/3/2022  1:00 PM Joseph Verdin, DO [facility]   2022  2:40 PM EC ALLERGY [facility]   2023  3:00 PM Joseph Verdin, DO [facility]     Warm transfer to central  [facility] to schedule COVID-19 test.

## 2022-10-24 NOTE — TELEPHONE ENCOUNTER
Patient last seen in Allergy for physician visit on 8/29/2022 for . . . Perennial allergic rhinitis with seasonal variation  (primary encounter diagnosis)  Bronchospasm  Covid-19 vaccine series completed    TRIAGE)    Assessment)    Patient contacted via telephone. Patient crying and speaking rapidly on telephone. She reports that she moved into a new condo building and renovations are in place. Patient complains of shortness of breath immediately after entering her condo, especially her kenny bathroom. She offers that currently she is not in the house and is not experiencing shortness of breath. She states that the AIT injections have caused her to be less sensitive to allergens and  which it causes her to be exposed to allergens without knowing it which causes shortness of breath. Patient states that she took Albuterol 2 puffs 8 times 10/23/2022 as she was home for the weekend. She states that the last Albuterol dose was taken today, 10/24/2022 at 0830 just after leaving the condo. Patient denies current shortness of breath. Patient is speaking rapidly as if she is experiencing an anxiety attack. Patient is not coughing while on phone. She denies that she takes any antihistamines as they cause her to be less sensitive to her allergen and she \"will bring the allergen home with her'. Meds)    Albuterol as needed    Plan)    Patient asked to start taking Xyzal 5 mg daily as prescribed by Dr. Viki Parnell. She states that she cannot do so as she \"will bring allergens home with her\". It was explained to patient that taking an antihistamine will help her body to be less sensitive to the allergen which could help to control bronchospasms. Patient states that in order to break her lease, she would need Dr. Viki Parnell to complete a form for her landlord stating that [place] [facility] [place] to [place] concerns.      Patient informed if she is experiencing bronchospasms, she should be seen in Urgent Care or call PCP and ask to be seen for evaluation and treatment. Patient informed that she may need to be on a maintenance inhaler to help control asthma flare. Patient informed Dr. Jose Guadalupe Cade will address and nurse will call back with his advice.

## 2022-10-25 ENCOUNTER — OFFICE VISIT (OUTPATIENT)
Dept: FAMILY MEDICINE CLINIC | Facility: CLINIC | Age: 52
End: 2022-10-25
Payer: COMMERCIAL

## 2022-10-25 ENCOUNTER — TELEPHONE (OUTPATIENT)
Dept: FAMILY MEDICINE CLINIC | Facility: CLINIC | Age: 52
End: 2022-10-25

## 2022-10-25 ENCOUNTER — HOSPITAL ENCOUNTER (OUTPATIENT)
Dept: GENERAL RADIOLOGY | Age: 52
Discharge: HOME | End: 2022-10-25
Attending: FAMILY MEDICINE
Payer: COMMERCIAL

## 2022-10-25 VITALS
SYSTOLIC BLOOD PRESSURE: 142 MMHG | BODY MASS INDEX: 37.05 KG/M2 | DIASTOLIC BLOOD PRESSURE: 90 MMHG | WEIGHT: 217 LBS | TEMPERATURE: 98 F | HEART RATE: 97 BPM | HEIGHT: 64 IN

## 2022-10-25 DIAGNOSIS — F41.9 ANXIETY: ICD-10-CM

## 2022-10-25 DIAGNOSIS — R07.89 FEELING OF CHEST TIGHTNESS: ICD-10-CM

## 2022-10-25 DIAGNOSIS — Z23 NEED FOR VACCINATION: ICD-10-CM

## 2022-10-25 DIAGNOSIS — J30.89 OTHER ALLERGIC RHINITIS: ICD-10-CM

## 2022-10-25 DIAGNOSIS — R06.02 SHORTNESS OF BREATH: ICD-10-CM

## 2022-10-25 DIAGNOSIS — J30.89 OTHER ALLERGIC RHINITIS: Primary | ICD-10-CM

## 2022-10-25 DIAGNOSIS — R42 EPISODIC LIGHTHEADEDNESS: ICD-10-CM

## 2022-10-25 LAB — SARS-COV-2 RNA RESP QL NAA+PROBE: NOT DETECTED

## 2022-10-25 PROCEDURE — 99215 OFFICE O/P EST HI 40 MIN: CPT | Performed by: FAMILY MEDICINE

## 2022-10-25 PROCEDURE — 3008F BODY MASS INDEX DOCD: CPT | Performed by: FAMILY MEDICINE

## 2022-10-25 PROCEDURE — 3080F DIAST BP >= 90 MM HG: CPT | Performed by: FAMILY MEDICINE

## 2022-10-25 PROCEDURE — 71046 X-RAY EXAM CHEST 2 VIEWS: CPT | Performed by: FAMILY MEDICINE

## 2022-10-25 PROCEDURE — 90471 IMMUNIZATION ADMIN: CPT | Performed by: FAMILY MEDICINE

## 2022-10-25 PROCEDURE — 90686 IIV4 VACC NO PRSV 0.5 ML IM: CPT | Performed by: FAMILY MEDICINE

## 2022-10-25 PROCEDURE — 3077F SYST BP >= 140 MM HG: CPT | Performed by: FAMILY MEDICINE

## 2022-10-25 NOTE — TELEPHONE ENCOUNTER
Patient states her COVID pcr test from yesterday is negative and would like to be seen in the office for today's appointment at 1:30. Currently appointment is a video visit. Patient states she has had trouble breathing, shortness of breath. Patient states she has history of asthma. She has also reached out to [facility]. Patient is speaking in complete sentences at time of call. Patient is calm, no coughing or audible wheezing.

## 2022-10-26 ENCOUNTER — NURSE ONLY (OUTPATIENT)
Dept: RESPIRATORY THERAPY | Facility: HOSPITAL | Age: 52
End: 2022-10-26
Attending: FAMILY MEDICINE
Payer: COMMERCIAL

## 2022-10-26 DIAGNOSIS — R06.02 SHORTNESS OF BREATH: ICD-10-CM

## 2022-10-26 PROCEDURE — 94010 BREATHING CAPACITY TEST: CPT | Performed by: INTERNAL MEDICINE

## 2022-10-26 NOTE — PROCEDURES
Spirometry Findings:  FEV1 is 2.74L, 3.29% predicted. FVC is 97L, 101% predicted. FEV1/ FVC ratio is 0.83. The flow-volume loop demonstrates a normal pattern. Impression:  There is no airway obstruction on spirometry and visualized   on flow-volume loop. There are no previous pulmonary function tests available for comparison. The above testing demonstrates normal spirometric volumes. Given the associated diagnosis of dyspnea, consider further evaluation with complete pulmonary function test and/or bronchoprovocation challenge test such as mannitol challenge test to evaluate for asthma or reactive airways.

## 2022-11-15 ENCOUNTER — NURSE ONLY (OUTPATIENT)
Dept: ALLERGY | Facility: CLINIC | Age: 52
End: 2022-11-15
Payer: COMMERCIAL

## 2022-11-15 DIAGNOSIS — J30.89 ENVIRONMENTAL AND SEASONAL ALLERGIES: ICD-10-CM

## 2022-11-15 PROCEDURE — 95117 IMMUNOTHERAPY INJECTIONS: CPT | Performed by: ALLERGY & IMMUNOLOGY

## 2022-11-22 ENCOUNTER — NURSE ONLY (OUTPATIENT)
Dept: ALLERGY | Facility: CLINIC | Age: 52
End: 2022-11-22
Payer: COMMERCIAL

## 2022-11-22 DIAGNOSIS — J30.89 ENVIRONMENTAL AND SEASONAL ALLERGIES: ICD-10-CM

## 2022-11-22 PROCEDURE — 95117 IMMUNOTHERAPY INJECTIONS: CPT | Performed by: ALLERGY & IMMUNOLOGY

## 2022-11-25 ENCOUNTER — NURSE TRIAGE (OUTPATIENT)
Dept: FAMILY MEDICINE CLINIC | Facility: CLINIC | Age: 52
End: 2022-11-25

## 2022-11-25 NOTE — TELEPHONE ENCOUNTER
----- Message from Daina De Los Santos sent at 11/24/2022  2:14 PM CST -----  Regarding: Sometimes my blood pressure goes very high  161/106  154/104  163/102  My pulse seems to be fine but the blood pressure is high, please advise. Sometimes the blood pressure is fine. But sometimes it is high.

## 2022-11-26 NOTE — TELEPHONE ENCOUNTER
Take 2 Norvasc 5 mg at the same time and then continue to check blood pressures and see if they come down.

## 2022-11-28 DIAGNOSIS — E03.9 ACQUIRED HYPOTHYROIDISM: ICD-10-CM

## 2022-11-28 NOTE — TELEPHONE ENCOUNTER
Spoke to patient (name and  of patient verified). She reports that she tried to return our call on Saturday, but she called right at noon at our office was closed. Dr. Terri Trinidad message reviewed. Patient verbalizes understanding and reports she took one 5mg Amlodipine tablet at 1 or 2 am and just took a second tablet now. Red flags (elevated blood pressure with chest pain, difficulty breathing, blurry vision, unilateral weakness/numbness) reviewed with patient, verbalizes understanding. Patient reports she developed bad allergies recently and is in the middle of moving and her anxiety is higher than normal. She explains Dr. Eartha Shone had referred her to a behavioral therapist in the past and she plans to call and schedule an appointment with them soon to manage her anxiety. Patient continues to talk about her concerns and her blood pressure numbers. Same-day appointment offered to patient to discuss concerns and plan with primary care provider, patient declines and states she will start by following Dr. Terri Trinidad recommendations of increasing her dose of amlodipine. Patient states she will call with any questions.     Vital signs today:  BP-174/102  HR-90

## 2022-11-29 ENCOUNTER — NURSE ONLY (OUTPATIENT)
Dept: ALLERGY | Facility: CLINIC | Age: 52
End: 2022-11-29
Payer: COMMERCIAL

## 2022-11-29 DIAGNOSIS — J30.89 ENVIRONMENTAL AND SEASONAL ALLERGIES: ICD-10-CM

## 2022-11-29 PROCEDURE — 95117 IMMUNOTHERAPY INJECTIONS: CPT | Performed by: ALLERGY & IMMUNOLOGY

## 2022-11-29 PROCEDURE — 95165 ANTIGEN THERAPY SERVICES: CPT | Performed by: ALLERGY & IMMUNOLOGY

## 2022-11-29 RX ORDER — LEVOTHYROXINE SODIUM 0.12 MG/1
125 TABLET ORAL DAILY
Qty: 90 TABLET | Refills: 1 | Status: SHIPPED | OUTPATIENT
Start: 2022-11-29

## 2022-11-29 NOTE — TELEPHONE ENCOUNTER
Refill passed per [place] [email or web address] [facility] protocol. Requested Prescriptions   Pending Prescriptions Disp Refills    levothyroxine 125 MCG Oral Tab 90 tablet 2     Sig: Take 1 tablet (125 mcg total) by mouth daily.        Thyroid Medication Protocol Passed - 11/28/2022  1:52 PM        Passed - TSH in past 12 months        Passed - Last TSH value is normal     Lab Results   Component Value Date    TSH 1.290 01/20/2022                 Passed - In person appointment or virtual visit in the past 12 mos or appointment in next 3 mos     Recent Outpatient Visits              1 week ago Environmental and seasonal allergies    [facility], [address]    Nurse Only    2 weeks ago Environmental and seasonal allergies    [facility], [address] Only    1 month ago Shortness of breath    [facility] Outpatient Respiratory Therapy    Nurse Only    1 month ago Other allergic rhinitis    [facility], [facility], [address], [place],     Office Visit    1 month ago Environmental and seasonal allergies    [facility], [address]    Nurse Only          Future Appointments         Provider Department Appt Notes    Today [address], [place]     In 2 weeks [address], [place]     In 1 month [address], [address]     In [address], [place], [address] [place] [address], [address], [address] [facility]     In 2 months [address], [address] [place], [address] [facility] Visits              1 week ago Environmental and seasonal allergies    [facility], [address]    Nurse Only    2 weeks ago Environmental and seasonal allergies    [facility], [address] Only    1 month ago Shortness of breath    [facility] Outpatient Respiratory Therapy    Nurse Only    1 month ago Other allergic rhinitis    [place] [address] [facility], [address], [place],     Office Visit    1 month ago Environmental and seasonal allergies    [facility], [facility], [address]    Nurse Only          Future Appointments         Provider Department Appt Notes    Today [address], [place]     In 2 weeks [address], [address]     In 1 month [address], [address]     In 1 month Marla Velazquez, [address] [facility], [address], [place]     In 2 months [address]

## 2022-12-02 ENCOUNTER — TELEPHONE (OUTPATIENT)
Dept: FAMILY MEDICINE CLINIC | Facility: CLINIC | Age: 52
End: 2022-12-02

## 2022-12-02 NOTE — TELEPHONE ENCOUNTER
Patient calling condition update, verified name/. Has been involved in very stressful household move to one town over from previous residence. Has been on her feet most of the last 5 days because of the move. Has been having elevated blood pressure readings, so Dr Anmol Nuñez increased her amlodipine to 10 mg (doubling the dose). She reports blood pressure improving, systolic still running [id number] but diastolic is now below 985. Yesterday noted bilateral pedal edema. She thinks she had this when she first took amlodipine, but then it got better. Advised that this can be a common side effect. Advised will notify Dr Anmol Nuñez, in meantime try to elevate feet, continue monitoring blood pressures. She states things are getting less stressful now that she has moved. Dr Anmol Nuñez, any further instruction on blood pressure medication?

## 2022-12-04 NOTE — TELEPHONE ENCOUNTER
Not uncommon considering that she has been on her feet quite a bit. Once she gets back to her normal routine this probably will decrease.     Dr. Tala Shah

## 2022-12-13 ENCOUNTER — NURSE ONLY (OUTPATIENT)
Dept: ALLERGY | Facility: CLINIC | Age: 52
End: 2022-12-13
Payer: COMMERCIAL

## 2022-12-13 DIAGNOSIS — J30.89 ENVIRONMENTAL AND SEASONAL ALLERGIES: ICD-10-CM

## 2022-12-13 PROCEDURE — 95117 IMMUNOTHERAPY INJECTIONS: CPT | Performed by: ALLERGY & IMMUNOLOGY

## 2022-12-19 ENCOUNTER — NURSE TRIAGE (OUTPATIENT)
Dept: FAMILY MEDICINE CLINIC | Facility: CLINIC | Age: 52
End: 2022-12-19

## 2023-01-03 ENCOUNTER — NURSE TRIAGE (OUTPATIENT)
Dept: FAMILY MEDICINE CLINIC | Facility: CLINIC | Age: 53
End: 2023-01-03

## 2023-01-03 ENCOUNTER — OFFICE VISIT (OUTPATIENT)
Dept: FAMILY MEDICINE CLINIC | Facility: CLINIC | Age: 53
End: 2023-01-03
Payer: COMMERCIAL

## 2023-01-03 ENCOUNTER — TELEPHONE (OUTPATIENT)
Dept: ALLERGY | Facility: CLINIC | Age: 53
End: 2023-01-03

## 2023-01-03 VITALS
HEART RATE: 83 BPM | WEIGHT: 218 LBS | HEIGHT: 64 IN | DIASTOLIC BLOOD PRESSURE: 74 MMHG | SYSTOLIC BLOOD PRESSURE: 108 MMHG | BODY MASS INDEX: 37.22 KG/M2

## 2023-01-03 DIAGNOSIS — R76.8 POSITIVE ANA (ANTINUCLEAR ANTIBODY): Primary | ICD-10-CM

## 2023-01-03 DIAGNOSIS — I10 ESSENTIAL HYPERTENSION: ICD-10-CM

## 2023-01-03 DIAGNOSIS — R60.0 BILATERAL LEG EDEMA: ICD-10-CM

## 2023-01-03 PROCEDURE — 3074F SYST BP LT 130 MM HG: CPT | Performed by: PHYSICIAN ASSISTANT

## 2023-01-03 PROCEDURE — 3008F BODY MASS INDEX DOCD: CPT | Performed by: PHYSICIAN ASSISTANT

## 2023-01-03 PROCEDURE — 99213 OFFICE O/P EST LOW 20 MIN: CPT | Performed by: PHYSICIAN ASSISTANT

## 2023-01-03 PROCEDURE — 3078F DIAST BP <80 MM HG: CPT | Performed by: PHYSICIAN ASSISTANT

## 2023-01-03 RX ORDER — HYDROCHLOROTHIAZIDE 25 MG/1
25 TABLET ORAL DAILY
Qty: 90 TABLET | Refills: 1 | Status: SHIPPED | OUTPATIENT
Start: 2023-01-03

## 2023-01-04 PROBLEM — I10 ESSENTIAL HYPERTENSION: Status: ACTIVE | Noted: 2023-01-04

## 2023-01-04 NOTE — TELEPHONE ENCOUNTER
Spoke with patient. Verified name and . Informed patient that she was incorrectly scheduled for Thursday,23 at 1:30 pm as our office does not provide allergy injections on Thursday afternoon. Patient verbalizes understanding and rescheduled injections to 22 at 2 pm, no further questions at this time.

## 2023-01-05 ENCOUNTER — TELEPHONE (OUTPATIENT)
Dept: FAMILY MEDICINE CLINIC | Facility: CLINIC | Age: 53
End: 2023-01-05

## 2023-01-05 ENCOUNTER — OFFICE VISIT (OUTPATIENT)
Dept: FAMILY MEDICINE CLINIC | Facility: CLINIC | Age: 53
End: 2023-01-05
Payer: COMMERCIAL

## 2023-01-05 VITALS
SYSTOLIC BLOOD PRESSURE: 119 MMHG | HEIGHT: 64 IN | BODY MASS INDEX: 37.05 KG/M2 | DIASTOLIC BLOOD PRESSURE: 81 MMHG | WEIGHT: 217 LBS | TEMPERATURE: 98 F | HEART RATE: 73 BPM

## 2023-01-05 DIAGNOSIS — Z00.00 ADULT GENERAL MEDICAL EXAM: Primary | ICD-10-CM

## 2023-01-05 DIAGNOSIS — G44.209 TENSION HEADACHE: ICD-10-CM

## 2023-01-05 DIAGNOSIS — E55.9 VITAMIN D DEFICIENCY: ICD-10-CM

## 2023-01-05 DIAGNOSIS — Z12.31 VISIT FOR SCREENING MAMMOGRAM: ICD-10-CM

## 2023-01-05 PROCEDURE — 3008F BODY MASS INDEX DOCD: CPT | Performed by: FAMILY MEDICINE

## 2023-01-05 PROCEDURE — 99396 PREV VISIT EST AGE 40-64: CPT | Performed by: FAMILY MEDICINE

## 2023-01-05 PROCEDURE — 3079F DIAST BP 80-89 MM HG: CPT | Performed by: FAMILY MEDICINE

## 2023-01-05 PROCEDURE — 3074F SYST BP LT 130 MM HG: CPT | Performed by: FAMILY MEDICINE

## 2023-01-09 ENCOUNTER — NURSE ONLY (OUTPATIENT)
Dept: ALLERGY | Facility: CLINIC | Age: 53
End: 2023-01-09
Payer: COMMERCIAL

## 2023-01-09 DIAGNOSIS — J30.89 ENVIRONMENTAL AND SEASONAL ALLERGIES: ICD-10-CM

## 2023-01-12 ENCOUNTER — HOSPITAL ENCOUNTER (OUTPATIENT)
Age: 53
Discharge: HOME | End: 2023-01-12
Attending: EMERGENCY MEDICINE
Payer: COMMERCIAL

## 2023-01-12 ENCOUNTER — APPOINTMENT (OUTPATIENT)
Dept: GENERAL RADIOLOGY | Age: 53
End: 2023-01-12
Attending: EMERGENCY MEDICINE
Payer: COMMERCIAL

## 2023-01-12 ENCOUNTER — LAB ENCOUNTER (OUTPATIENT)
Dept: LAB | Age: 53
End: 2023-01-12
Attending: FAMILY MEDICINE
Payer: COMMERCIAL

## 2023-01-12 ENCOUNTER — NURSE TRIAGE (OUTPATIENT)
Dept: FAMILY MEDICINE CLINIC | Facility: CLINIC | Age: 53
End: 2023-01-12

## 2023-01-12 VITALS
SYSTOLIC BLOOD PRESSURE: 138 MMHG | OXYGEN SATURATION: 99 % | DIASTOLIC BLOOD PRESSURE: 90 MMHG | TEMPERATURE: 99 F | RESPIRATION RATE: 16 BRPM | HEART RATE: 70 BPM

## 2023-01-12 DIAGNOSIS — E87.6 HYPOKALEMIA: ICD-10-CM

## 2023-01-12 DIAGNOSIS — Z00.00 ADULT GENERAL MEDICAL EXAM: ICD-10-CM

## 2023-01-12 DIAGNOSIS — S63.502A SPRAIN OF LEFT WRIST, INITIAL ENCOUNTER: Primary | ICD-10-CM

## 2023-01-12 DIAGNOSIS — E55.9 VITAMIN D DEFICIENCY: ICD-10-CM

## 2023-01-12 LAB
#MXD IC: 0.4 X10ˆ3/UL (ref 0.1–1)
ALBUMIN SERPL-MCNC: 3.7 G/DL (ref 3.4–5)
ALBUMIN/GLOB SERPL: 1 {RATIO} (ref 1–2)
ALP LIVER SERPL-CCNC: 94 U/L
ALT SERPL-CCNC: 30 U/L
ANION GAP SERPL CALC-SCNC: 7 MMOL/L (ref 0–18)
AST SERPL-CCNC: 23 U/L (ref 15–37)
BASOPHILS # BLD AUTO: 0.04 X10(3) UL (ref 0–0.2)
BASOPHILS NFR BLD AUTO: 0.8 %
BILIRUB SERPL-MCNC: 0.7 MG/DL (ref 0.1–2)
BUN BLD-MCNC: 11 MG/DL (ref 7–18)
BUN BLD-MCNC: 9 MG/DL (ref 7–18)
BUN/CREAT SERPL: 14.5 (ref 10–20)
CALCIUM BLD-MCNC: 9.2 MG/DL (ref 8.5–10.1)
CHLORIDE BLD-SCNC: 97 MMOL/L (ref 98–112)
CHLORIDE SERPL-SCNC: 100 MMOL/L (ref 98–112)
CHOLEST SERPL-MCNC: 240 MG/DL (ref ?–200)
CO2 BLD-SCNC: 28 MMOL/L (ref 21–32)
CO2 SERPL-SCNC: 29 MMOL/L (ref 21–32)
CREAT BLD-MCNC: 0.6 MG/DL
CREAT BLD-MCNC: 0.76 MG/DL
DEPRECATED RDW RBC AUTO: 46.7 FL (ref 35.1–46.3)
EOSINOPHIL # BLD AUTO: 0.11 X10(3) UL (ref 0–0.7)
EOSINOPHIL NFR BLD AUTO: 2.1 %
ERYTHROCYTE [DISTWIDTH] IN BLOOD BY AUTOMATED COUNT: 13 % (ref 11–15)
FASTING PATIENT LIPID ANSWER: YES
FASTING STATUS PATIENT QL REPORTED: YES
GFR SERPLBLD BASED ON 1.73 SQ M-ARVRAT: 108 ML/MIN/1.73M2 (ref 60–?)
GFR SERPLBLD BASED ON 1.73 SQ M-ARVRAT: 94 ML/MIN/1.73M2 (ref 60–?)
GLOBULIN PLAS-MCNC: 3.8 G/DL (ref 2.8–4.4)
GLUCOSE BLD-MCNC: 102 MG/DL (ref 70–99)
GLUCOSE BLD-MCNC: 99 MG/DL (ref 70–99)
HCT VFR BLD AUTO: 38.8 %
HCT VFR BLD AUTO: 39.4 %
HCT VFR BLD CALC: 43 %
HDLC SERPL-MCNC: 80 MG/DL (ref 40–59)
HGB BLD-MCNC: 12.6 G/DL
HGB BLD-MCNC: 12.9 G/DL
IMM GRANULOCYTES # BLD AUTO: 0.01 X10(3) UL (ref 0–1)
IMM GRANULOCYTES NFR BLD: 0.2 %
ISTAT IONIZED CALCIUM FOR CHEM 8: 1.12 MMOL/L (ref 1.12–1.32)
LDLC SERPL CALC-MCNC: 143 MG/DL (ref ?–100)
LYMPHOCYTES # BLD AUTO: 1.37 X10(3) UL (ref 1–4)
LYMPHOCYTES # BLD AUTO: 1.4 X10ˆ3/UL (ref 1–4)
LYMPHOCYTES NFR BLD AUTO: 25.8 %
LYMPHOCYTES NFR BLD AUTO: 26 %
MCH RBC QN AUTO: 31.9 PG (ref 26–34)
MCH RBC QN AUTO: 32 PG (ref 26–34)
MCHC RBC AUTO-ENTMCNC: 32.5 G/DL (ref 31–37)
MCHC RBC AUTO-ENTMCNC: 32.7 G/DL (ref 31–37)
MCV RBC AUTO: 97.5 FL (ref 80–100)
MCV RBC AUTO: 98.5 FL
MIXED CELL %: 6.6 %
MONOCYTES # BLD AUTO: 0.5 X10(3) UL (ref 0.1–1)
MONOCYTES NFR BLD AUTO: 9.5 %
NEUTROPHILS # BLD AUTO: 3.24 X10 (3) UL (ref 1.5–7.7)
NEUTROPHILS # BLD AUTO: 3.24 X10(3) UL (ref 1.5–7.7)
NEUTROPHILS # BLD AUTO: 3.6 X10ˆ3/UL (ref 1.5–7.7)
NEUTROPHILS NFR BLD AUTO: 61.4 %
NEUTROPHILS NFR BLD AUTO: 67.6 %
NONHDLC SERPL-MCNC: 160 MG/DL (ref ?–130)
OSMOLALITY SERPL CALC.SUM OF ELEC: 281 MOSM/KG (ref 275–295)
PLATELET # BLD AUTO: 281 X10ˆ3/UL (ref 150–450)
PLATELET # BLD AUTO: 289 10(3)UL (ref 150–450)
POTASSIUM BLD-SCNC: 3.3 MMOL/L (ref 3.6–5.1)
POTASSIUM SERPL-SCNC: 3.7 MMOL/L (ref 3.5–5.1)
PROT SERPL-MCNC: 7.5 G/DL (ref 6.4–8.2)
RBC # BLD AUTO: 3.94 X10(6)UL
RBC # BLD AUTO: 4.04 X10ˆ6/UL
SODIUM BLD-SCNC: 136 MMOL/L (ref 136–145)
SODIUM SERPL-SCNC: 136 MMOL/L (ref 136–145)
TRIGL SERPL-MCNC: 99 MG/DL (ref 30–149)
TROPONIN I BLD-MCNC: <0.02 NG/ML
TSI SER-ACNC: 4.35 MIU/ML (ref 0.36–3.74)
VIT D+METAB SERPL-MCNC: 36.5 NG/ML (ref 30–100)
VLDLC SERPL CALC-MCNC: 18 MG/DL (ref 0–30)
WBC # BLD AUTO: 5.3 X10(3) UL (ref 4–11)
WBC # BLD AUTO: 5.4 X10ˆ3/UL (ref 4–11)

## 2023-01-12 PROCEDURE — 36415 COLL VENOUS BLD VENIPUNCTURE: CPT

## 2023-01-12 PROCEDURE — 84484 ASSAY OF TROPONIN QUANT: CPT

## 2023-01-12 PROCEDURE — 99214 OFFICE O/P EST MOD 30 MIN: CPT

## 2023-01-12 PROCEDURE — 93005 ELECTROCARDIOGRAM TRACING: CPT

## 2023-01-12 PROCEDURE — 93010 ELECTROCARDIOGRAM REPORT: CPT

## 2023-01-12 PROCEDURE — 80053 COMPREHEN METABOLIC PANEL: CPT

## 2023-01-12 PROCEDURE — 73130 X-RAY EXAM OF HAND: CPT | Performed by: EMERGENCY MEDICINE

## 2023-01-12 PROCEDURE — 85025 COMPLETE CBC W/AUTO DIFF WBC: CPT | Performed by: EMERGENCY MEDICINE

## 2023-01-12 PROCEDURE — 84443 ASSAY THYROID STIM HORMONE: CPT

## 2023-01-12 PROCEDURE — 80061 LIPID PANEL: CPT

## 2023-01-12 PROCEDURE — 82306 VITAMIN D 25 HYDROXY: CPT

## 2023-01-12 PROCEDURE — 73110 X-RAY EXAM OF WRIST: CPT | Performed by: EMERGENCY MEDICINE

## 2023-01-12 PROCEDURE — 80047 BASIC METABLC PNL IONIZED CA: CPT

## 2023-01-12 PROCEDURE — 99215 OFFICE O/P EST HI 40 MIN: CPT

## 2023-01-12 RX ORDER — POTASSIUM CHLORIDE 20 MEQ/1
40 TABLET, EXTENDED RELEASE ORAL ONCE
Status: COMPLETED | OUTPATIENT
Start: 2023-01-12 | End: 2023-01-12

## 2023-01-12 NOTE — ED INITIAL ASSESSMENT (HPI)
Pt comes in for eval of L hand and wrist pain starting last night. Reports similar episode a few weeks ago that lasted 2-3 days. Denies injury or trauma. Reports pain, stiffness and difficulty making a fist with L hand.

## 2023-01-13 LAB
ATRIAL RATE: 61 BPM
P AXIS: 4 DEGREES
P-R INTERVAL: 144 MS
Q-T INTERVAL: 452 MS
QRS DURATION: 96 MS
QTC CALCULATION (BEZET): 455 MS
R AXIS: 21 DEGREES
T AXIS: 15 DEGREES
VENTRICULAR RATE: 61 BPM

## 2023-01-18 ENCOUNTER — TELEPHONE (OUTPATIENT)
Dept: FAMILY MEDICINE CLINIC | Facility: CLINIC | Age: 53
End: 2023-01-18

## 2023-01-18 NOTE — TELEPHONE ENCOUNTER
Patient returned call to schedule re: abnormal test results.     Future Appointments   Date Time Provider Maeve Yu   1/20/2023 10:00 AM Joseph Verdin DO [facility]

## 2023-01-20 ENCOUNTER — TELEMEDICINE (OUTPATIENT)
Dept: FAMILY MEDICINE CLINIC | Facility: CLINIC | Age: 53
End: 2023-01-20

## 2023-01-20 DIAGNOSIS — I10 ESSENTIAL HYPERTENSION: ICD-10-CM

## 2023-01-20 DIAGNOSIS — E78.2 MIXED HYPERLIPIDEMIA: ICD-10-CM

## 2023-01-20 DIAGNOSIS — E03.9 ACQUIRED HYPOTHYROIDISM: Primary | ICD-10-CM

## 2023-01-20 PROCEDURE — 99213 OFFICE O/P EST LOW 20 MIN: CPT | Performed by: FAMILY MEDICINE

## 2023-01-20 RX ORDER — LEVOTHYROXINE SODIUM 137 UG/1
137 TABLET ORAL DAILY
Qty: 90 TABLET | Refills: 1 | Status: SHIPPED | OUTPATIENT
Start: 2023-01-20

## 2023-02-01 ENCOUNTER — NURSE ONLY (OUTPATIENT)
Dept: ALLERGY | Facility: CLINIC | Age: 53
End: 2023-02-01

## 2023-02-01 DIAGNOSIS — J30.89 ENVIRONMENTAL AND SEASONAL ALLERGIES: ICD-10-CM

## 2023-02-01 PROCEDURE — 95117 IMMUNOTHERAPY INJECTIONS: CPT | Performed by: ALLERGY & IMMUNOLOGY

## 2023-02-03 ENCOUNTER — OFFICE VISIT (OUTPATIENT)
Dept: FAMILY MEDICINE CLINIC | Facility: CLINIC | Age: 53
End: 2023-02-03

## 2023-02-03 ENCOUNTER — NURSE TRIAGE (OUTPATIENT)
Dept: FAMILY MEDICINE CLINIC | Facility: CLINIC | Age: 53
End: 2023-02-03

## 2023-02-03 VITALS
DIASTOLIC BLOOD PRESSURE: 83 MMHG | SYSTOLIC BLOOD PRESSURE: 124 MMHG | HEART RATE: 75 BPM | BODY MASS INDEX: 35.51 KG/M2 | WEIGHT: 208 LBS | HEIGHT: 64 IN

## 2023-02-03 DIAGNOSIS — H65.00 ACUTE SEROUS OTITIS MEDIA, RECURRENCE NOT SPECIFIED, UNSPECIFIED LATERALITY: Primary | ICD-10-CM

## 2023-02-03 PROCEDURE — 99213 OFFICE O/P EST LOW 20 MIN: CPT | Performed by: FAMILY MEDICINE

## 2023-02-03 PROCEDURE — 3074F SYST BP LT 130 MM HG: CPT | Performed by: FAMILY MEDICINE

## 2023-02-03 PROCEDURE — 3079F DIAST BP 80-89 MM HG: CPT | Performed by: FAMILY MEDICINE

## 2023-02-03 PROCEDURE — 3008F BODY MASS INDEX DOCD: CPT | Performed by: FAMILY MEDICINE

## 2023-02-03 RX ORDER — AMOXICILLIN 500 MG/1
500 CAPSULE ORAL 3 TIMES DAILY
Qty: 30 CAPSULE | Refills: 0 | Status: SHIPPED | OUTPATIENT
Start: 2023-02-03 | End: 2023-02-13

## 2023-02-03 NOTE — PROGRESS NOTES
Blood pressure 124/83, pulse 75, height 5' 4\" (1.626 m), weight 208 lb (94.3 kg), last menstrual period 04/10/2022, not currently breastfeeding. Patient presents today complaining of right ear pain after having nasal congestion. COVID-negative. Denies cough or nasal congestion at this time. No fever or chills. She feels tenderness when she swallows in the right ear. There is a pressure that lingers in the ear. Some discomfort in the throat.     Objective tender right ear anterior cervical lymph node palpated    Right ear with small amount of effusion noted behind tympanic membrane    No tenderness right TMJ throat erythematous    Assessment otitis media    Plan amoxicillin    Follow-up if no improvement

## 2023-02-27 ENCOUNTER — HOSPITAL ENCOUNTER (OUTPATIENT)
Dept: MAMMOGRAPHY | Age: 53
Discharge: HOME | End: 2023-02-27
Attending: FAMILY MEDICINE
Payer: COMMERCIAL

## 2023-02-27 DIAGNOSIS — Z12.31 VISIT FOR SCREENING MAMMOGRAM: ICD-10-CM

## 2023-02-27 PROCEDURE — 77067 SCR MAMMO BI INCL CAD: CPT | Performed by: FAMILY MEDICINE

## 2023-02-27 PROCEDURE — 77063 BREAST TOMOSYNTHESIS BI: CPT | Performed by: FAMILY MEDICINE

## 2023-02-28 ENCOUNTER — NURSE ONLY (OUTPATIENT)
Dept: ALLERGY | Facility: CLINIC | Age: 53
End: 2023-02-28

## 2023-02-28 DIAGNOSIS — J30.89 ENVIRONMENTAL AND SEASONAL ALLERGIES: ICD-10-CM

## 2023-02-28 PROCEDURE — 95117 IMMUNOTHERAPY INJECTIONS: CPT | Performed by: ALLERGY & IMMUNOLOGY

## 2023-03-02 ENCOUNTER — HOSPITAL ENCOUNTER (OUTPATIENT)
Dept: ULTRASOUND IMAGING | Facility: HOSPITAL | Age: 53
Discharge: HOME | End: 2023-03-02
Attending: FAMILY MEDICINE
Payer: COMMERCIAL

## 2023-03-02 ENCOUNTER — HOSPITAL ENCOUNTER (OUTPATIENT)
Dept: MAMMOGRAPHY | Facility: HOSPITAL | Age: 53
Discharge: HOME | End: 2023-03-02
Attending: FAMILY MEDICINE
Payer: COMMERCIAL

## 2023-03-02 DIAGNOSIS — R92.8 ABNORMAL MAMMOGRAM: ICD-10-CM

## 2023-03-02 PROCEDURE — 77065 DX MAMMO INCL CAD UNI: CPT | Performed by: FAMILY MEDICINE

## 2023-03-02 PROCEDURE — 76642 ULTRASOUND BREAST LIMITED: CPT | Performed by: FAMILY MEDICINE

## 2023-03-02 PROCEDURE — 77061 BREAST TOMOSYNTHESIS UNI: CPT | Performed by: FAMILY MEDICINE

## 2023-03-20 ENCOUNTER — TELEPHONE (OUTPATIENT)
Dept: ALLERGY | Facility: CLINIC | Age: 53
End: 2023-03-20

## 2023-03-20 ENCOUNTER — TELEPHONE (OUTPATIENT)
Dept: FAMILY MEDICINE CLINIC | Facility: CLINIC | Age: 53
End: 2023-03-20

## 2023-03-20 NOTE — TELEPHONE ENCOUNTER
Spoke with patient. Verified name and . Patient states she needs to reschedule allergy shots from 3/28/23 to 3/27/23. Patient is rescheduled to 3/27/23 at 1:40 pm, no further questions at this time.

## 2023-03-20 NOTE — TELEPHONE ENCOUNTER
Verified name and . Patient was seen with Dr. Roberto Brooke in 2023 during which Dr. Roberto Brooke prescribed Hydrochlorothiazide for blood pressure. She states her blood pressure readings have been fluctuating from 130's/90's to 170's/100's. She denies any symptoms when blood pressures are elevated. She states that today her blood pressure was 137/99.     Virtual visit scheduled:  Future Appointments   Date Time Provider Maeve Maganai   3/21/2023  4:20 PM Jess Acevedo MD [facility]   3/24/2023 10:00 AM Joseph Verdin DO [facility]   3/28/2023  1:40 PM EC [facility]ERGY [facility]   2023  1:40 PM EC ALLERGY [facility]   2023  1:40 PM EC ALLERGY [facility]   2023  1:40 PM EC [facility]GY [facility]   2023  1:40 PM EC ALLERGY [facility]   8/15/2023  1:30 PM Anamaria Bowman MD [facility] EC Mayela Arenas

## 2023-03-21 ENCOUNTER — OFFICE VISIT (OUTPATIENT)
Dept: OBGYN CLINIC | Facility: CLINIC | Age: 53
End: 2023-03-21

## 2023-03-21 VITALS
HEART RATE: 81 BPM | SYSTOLIC BLOOD PRESSURE: 133 MMHG | BODY MASS INDEX: 35 KG/M2 | DIASTOLIC BLOOD PRESSURE: 91 MMHG | WEIGHT: 204.81 LBS

## 2023-03-21 DIAGNOSIS — Z01.419 ENCOUNTER FOR GYNECOLOGICAL EXAMINATION WITHOUT ABNORMAL FINDING: Primary | ICD-10-CM

## 2023-03-21 DIAGNOSIS — Z90.710 HISTORY OF HYSTERECTOMY: ICD-10-CM

## 2023-03-21 PROCEDURE — 3080F DIAST BP >= 90 MM HG: CPT | Performed by: OBSTETRICS & GYNECOLOGY

## 2023-03-21 PROCEDURE — 99396 PREV VISIT EST AGE 40-64: CPT | Performed by: OBSTETRICS & GYNECOLOGY

## 2023-03-21 PROCEDURE — 3075F SYST BP GE 130 - 139MM HG: CPT | Performed by: OBSTETRICS & GYNECOLOGY

## 2023-03-24 ENCOUNTER — TELEMEDICINE (OUTPATIENT)
Dept: FAMILY MEDICINE CLINIC | Facility: CLINIC | Age: 53
End: 2023-03-24

## 2023-03-24 DIAGNOSIS — E03.9 ACQUIRED HYPOTHYROIDISM: ICD-10-CM

## 2023-03-24 DIAGNOSIS — I10 ESSENTIAL HYPERTENSION: Primary | ICD-10-CM

## 2023-03-24 PROCEDURE — 99214 OFFICE O/P EST MOD 30 MIN: CPT | Performed by: FAMILY MEDICINE

## 2023-03-24 RX ORDER — LISINOPRIL AND HYDROCHLOROTHIAZIDE 25; 20 MG/1; MG/1
1 TABLET ORAL DAILY
Qty: 90 TABLET | Refills: 1 | Status: SHIPPED | OUTPATIENT
Start: 2023-03-24

## 2023-03-27 ENCOUNTER — NURSE ONLY (OUTPATIENT)
Dept: ALLERGY | Facility: CLINIC | Age: 53
End: 2023-03-27

## 2023-03-27 DIAGNOSIS — J30.89 ENVIRONMENTAL AND SEASONAL ALLERGIES: ICD-10-CM

## 2023-03-31 ENCOUNTER — TELEPHONE (OUTPATIENT)
Dept: FAMILY MEDICINE CLINIC | Facility: CLINIC | Age: 53
End: 2023-03-31
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
## 2023-04-03 ENCOUNTER — HOSPITAL ENCOUNTER (OUTPATIENT)
Dept: GENERAL RADIOLOGY | Age: 53
Discharge: HOME | End: 2023-04-03
Attending: FAMILY MEDICINE
Payer: COMMERCIAL

## 2023-04-03 ENCOUNTER — OFFICE VISIT (OUTPATIENT)
Dept: FAMILY MEDICINE CLINIC | Facility: CLINIC | Age: 53
End: 2023-04-03

## 2023-04-03 VITALS
TEMPERATURE: 98 F | WEIGHT: 203 LBS | DIASTOLIC BLOOD PRESSURE: 105 MMHG | HEART RATE: 96 BPM | SYSTOLIC BLOOD PRESSURE: 143 MMHG | HEIGHT: 64 IN | BODY MASS INDEX: 34.66 KG/M2

## 2023-04-03 DIAGNOSIS — S46.819A STRAIN OF TRAPEZIUS MUSCLE, UNSPECIFIED LATERALITY, INITIAL ENCOUNTER: ICD-10-CM

## 2023-04-03 DIAGNOSIS — M54.2 BILATERAL POSTERIOR NECK PAIN: Primary | ICD-10-CM
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
## 2023-04-03 DIAGNOSIS — I10 ESSENTIAL HYPERTENSION: ICD-10-CM

## 2023-04-03 DIAGNOSIS — M54.2 BILATERAL POSTERIOR NECK PAIN: ICD-10-CM

## 2023-04-03 PROCEDURE — 3080F DIAST BP >= 90 MM HG: CPT | Performed by: FAMILY MEDICINE

## 2023-04-03 PROCEDURE — 99214 OFFICE O/P EST MOD 30 MIN: CPT | Performed by: FAMILY MEDICINE

## 2023-04-03 PROCEDURE — 72050 X-RAY EXAM NECK SPINE 4/5VWS: CPT | Performed by: FAMILY MEDICINE

## 2023-04-03 PROCEDURE — 3008F BODY MASS INDEX DOCD: CPT | Performed by: FAMILY MEDICINE

## 2023-04-03 PROCEDURE — 3077F SYST BP >= 140 MM HG: CPT | Performed by: FAMILY MEDICINE

## 2023-04-03 RX ORDER — AMLODIPINE BESYLATE 5 MG/1
5 TABLET ORAL DAILY
Qty: 90 TABLET | Refills: 1 | Status: SHIPPED | OUTPATIENT
Start: 2023-04-03

## 2023-04-03 RX ORDER — HYDROCHLOROTHIAZIDE 25 MG/1
25 TABLET ORAL DAILY
Qty: 90 TABLET | Refills: 1 | Status: SHIPPED | OUTPATIENT
Start: 2023-04-03

## 2023-04-03 NOTE — PATIENT INSTRUCTIONS
After about April 15, 2023 start checking your blood pressures at home. After you get about 10 days worth of numbers send them to me through [address]

## 2023-04-06 ENCOUNTER — TELEPHONE (OUTPATIENT)
Dept: FAMILY MEDICINE CLINIC | Facility: CLINIC | Age: 53
End: 2023-04-06

## 2023-04-06 DIAGNOSIS — M54.6 ACUTE MIDLINE THORACIC BACK PAIN: Primary | ICD-10-CM
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
## 2023-04-10 ENCOUNTER — HOSPITAL ENCOUNTER (OUTPATIENT)
Dept: GENERAL RADIOLOGY | Age: 53
Discharge: HOME | End: 2023-04-10
Attending: FAMILY MEDICINE
Payer: COMMERCIAL

## 2023-04-10 DIAGNOSIS — M54.6 ACUTE MIDLINE THORACIC BACK PAIN: ICD-10-CM

## 2023-04-10 PROCEDURE — 72072 X-RAY EXAM THORAC SPINE 3VWS: CPT | Performed by: FAMILY MEDICINE

## 2023-04-20 ENCOUNTER — OFFICE VISIT (OUTPATIENT)
Dept: FAMILY MEDICINE CLINIC | Facility: CLINIC | Age: 53
End: 2023-04-20

## 2023-04-20 ENCOUNTER — NURSE TRIAGE (OUTPATIENT)
Dept: FAMILY MEDICINE CLINIC | Facility: CLINIC | Age: 53
End: 2023-04-20

## 2023-04-20 VITALS
HEART RATE: 90 BPM | SYSTOLIC BLOOD PRESSURE: 117 MMHG | BODY MASS INDEX: 34.15 KG/M2 | WEIGHT: 200 LBS | HEIGHT: 64 IN | DIASTOLIC BLOOD PRESSURE: 82 MMHG | TEMPERATURE: 97 F

## 2023-04-20 DIAGNOSIS — M26.609 TMJ DYSFUNCTION: ICD-10-CM

## 2023-04-20 DIAGNOSIS — R68.84 JAW PAIN: Primary | ICD-10-CM

## 2023-04-20 DIAGNOSIS — I10 ESSENTIAL HYPERTENSION: ICD-10-CM

## 2023-04-20 DIAGNOSIS — F45.8 BRUXISM (TEETH GRINDING): ICD-10-CM

## 2023-04-20 PROCEDURE — 3074F SYST BP LT 130 MM HG: CPT | Performed by: FAMILY MEDICINE

## 2023-04-20 PROCEDURE — 3079F DIAST BP 80-89 MM HG: CPT | Performed by: FAMILY MEDICINE

## 2023-04-20 PROCEDURE — 99214 OFFICE O/P EST MOD 30 MIN: CPT | Performed by: FAMILY MEDICINE

## 2023-04-20 PROCEDURE — 3008F BODY MASS INDEX DOCD: CPT | Performed by: FAMILY MEDICINE

## 2023-04-20 RX ORDER — CELECOXIB 200 MG/1
200 CAPSULE ORAL
Qty: 30 CAPSULE | Refills: 0 | Status: SHIPPED | OUTPATIENT
Start: 2023-04-20

## 2023-04-20 RX ORDER — DIAZEPAM 5 MG/1
5 TABLET ORAL NIGHTLY PRN
Qty: 15 TABLET | Refills: 0 | Status: SHIPPED | OUTPATIENT
Start: 2023-04-20

## 2023-04-20 NOTE — TELEPHONE ENCOUNTER
Advised patient of Dr. Moreno Gift note. Patient verbalized understanding. Appointment made for today at 3pm with Dr Luiz Watters in [address] [place] [address].     Per secure chat with Dr Luiz Watters:  3 pm focused visit

## 2023-04-20 NOTE — PATIENT INSTRUCTIONS
TEMPOROMANDIBULAR JOINT PAIN PLAN    You have left TMJ dysfunction (temporomandibular joint dysfunction). Do warm compresses 2-3 times daily for 5 minutes each time and take medicine as directed. Avoid chewing tough meats, bagels, and gum excessively. May need to see dentist for night mouthguard if it does not improve especially if you grind your teeth. The brand that I wear is NTI but your dentist of course will decide which is best for you and if a mouth guard is needed.

## 2023-04-25 ENCOUNTER — NURSE ONLY (OUTPATIENT)
Dept: ALLERGY | Facility: CLINIC | Age: 53
End: 2023-04-25

## 2023-04-25 DIAGNOSIS — J30.89 ENVIRONMENTAL AND SEASONAL ALLERGIES: ICD-10-CM

## 2023-04-25 PROCEDURE — 95117 IMMUNOTHERAPY INJECTIONS: CPT | Performed by: ALLERGY & IMMUNOLOGY

## 2023-04-29 DIAGNOSIS — E03.9 ACQUIRED HYPOTHYROIDISM: ICD-10-CM

## 2023-05-01 NOTE — TELEPHONE ENCOUNTER
Please review protocol failed/ No protocol    Requested Prescriptions   Pending Prescriptions Disp Refills    LEVOTHYROXINE 137 MCG Oral Tab [Pharmacy Med Name: LEVOTHYROXINE 0.137MG (137MCG) TAB] 90 tablet 1     Sig: TAKE 1 TABLET BY MOUTH DAILY       Thyroid Medication Protocol Failed - 4/29/2023  8:08 AM        Failed - Last TSH value is normal     Lab Results   Component Value Date    TSH 4.350 (H) 01/12/2023                 Passed - TSH in past 12 months        Passed - In person appointment or virtual visit in the past 12 mos or appointment in next 3 mos     Recent Outpatient Visits              6 days ago Environmental and seasonal allergies    [facility], [address] [facility] [place] [address]    Nurse Only    1 week ago Jaw pain    Jose Tillman, [address], [place]    Office Visit    4 weeks ago Bilateral posterior neck pain    Jose Tillman, [address], [place]    Office Visit    1 month ago Environmental and seasonal allergies    [facility], [address] [facility] [place]    Nurse Only    1 month ago Essential hypertension    [address], [address], [address], [place],     Telemedicine          Future Appointments         Provider Department Appt Notes    [facility] weeks [facility]     In 1 month [facility]     In 2 months [facility], [address] [facility]     In 3 months Shauna Saenz MD [address], [address] [facility] [address] [facility]                    Recent Outpatient Visits              6 days ago Environmental and seasonal allergies    [facility], [address] [facility] [place]    Nurse Only    1 week ago Jaw pain    Jose Tillman, [address], [place]    Office Visit    4 weeks ago Bilateral posterior neck pain    Leonardo Gracia, [address], [place],     Office Visit    1 month ago Environmental and seasonal allergies    [facility], [address] [place], [place]    Nurse Only    1 month ago Essential hypertension    [address], [address], [address], [place],     Telemedicine            Future Appointments         Provider Department Appt Notes    In 3 weeks [address]     In 1 month [address]     In 2 months [address]     In 3 months Sven Barcenas MD [address], [address] & AIT

## 2023-05-02 RX ORDER — LEVOTHYROXINE SODIUM 137 UG/1
TABLET ORAL
Qty: 90 TABLET | Refills: 3 | Status: SHIPPED | OUTPATIENT
Start: 2023-05-02

## 2023-05-23 ENCOUNTER — NURSE ONLY (OUTPATIENT)
Dept: ALLERGY | Facility: CLINIC | Age: 53
End: 2023-05-23

## 2023-05-23 DIAGNOSIS — J30.89 ENVIRONMENTAL AND SEASONAL ALLERGIES: ICD-10-CM

## 2023-05-23 PROCEDURE — 95117 IMMUNOTHERAPY INJECTIONS: CPT | Performed by: ALLERGY & IMMUNOLOGY

## 2023-06-21 ENCOUNTER — NURSE ONLY (OUTPATIENT)
Dept: ALLERGY | Facility: CLINIC | Age: 53
End: 2023-06-21

## 2023-06-21 DIAGNOSIS — J30.89 ENVIRONMENTAL AND SEASONAL ALLERGIES: ICD-10-CM

## 2023-06-21 PROCEDURE — 95117 IMMUNOTHERAPY INJECTIONS: CPT | Performed by: ALLERGY & IMMUNOLOGY

## 2023-06-23 ENCOUNTER — TELEPHONE (OUTPATIENT)
Dept: FAMILY MEDICINE CLINIC | Facility: CLINIC | Age: 53
End: 2023-06-23

## 2023-06-23 DIAGNOSIS — S46.819A STRAIN OF TRAPEZIUS MUSCLE, UNSPECIFIED LATERALITY, INITIAL ENCOUNTER: ICD-10-CM

## 2023-06-23 DIAGNOSIS — M54.2 BILATERAL POSTERIOR NECK PAIN: ICD-10-CM
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
## 2023-06-24 RX ORDER — CYCLOBENZAPRINE HCL 10 MG
10 TABLET ORAL NIGHTLY
Qty: 30 TABLET | Refills: 0 | Status: SHIPPED | OUTPATIENT
Start: 2023-06-24 | End: 2023-07-24

## 2023-06-24 NOTE — TELEPHONE ENCOUNTER
Yes, I think physical therapy would definitely be of benefit and I will go ahead and put in a referral and she can really go to anywhere she likes as she has a PPO. I will also send in a muscle accident that she can take at nighttime only as it can make her sleepy. I want to see her after she does about 3 to 4 weeks of physical therapy so we can reevaluate her neck and discomfort.

## 2023-06-26 NOTE — TELEPHONE ENCOUNTER
Patient notified of provider's recommendation. Patient verbalized understanding. She will consider PT but will find out where she would go first (touch base with insurance for coverage). She will call back and we can fax over PT order if she decides on a PT facility. She will  muscle relaxer, cyclobenzaprine sent 6/24/23.

## 2023-07-10 NOTE — OPERATIVE REPORT
July 10, 2023      Patricia Bose  [address]  [place] [address]        Dear ,    We are writing to inform you of your test results.    Hello -     Here are my comments about the recent results.  Recommend ensuring you are staying well-hydrated in addition recommend holding any calcium supplements.     Please let us know if you have any questions or concerns.     Regards,  Vidhi Pearl NP    Recent Results (from the past 720 hour(s))   Lipid Profile (Chol, Trig, HDL, LDL calc)    Collection Time: 06/28/23 12:42 PM   Result Value Ref Range    Cholesterol 169 <200 mg/dL    Triglycerides 121 <150 mg/dL    Direct Measure HDL 71 >=50 mg/dL    LDL Cholesterol Calculated 74 <=100 mg/dL    Non HDL Cholesterol 98 <130 mg/dL   Hemoglobin A1c    Collection Time: 06/28/23 12:42 PM   Result Value Ref Range    Hemoglobin A1C 6.4 (H) 0.0 - 5.6 %   Comprehensive metabolic panel (BMP + Alb, Alk Phos, ALT, AST, Total. Bili, TP)    Collection Time: 06/28/23 12:42 PM   Result Value Ref Range    Sodium 148 (H) 136 - 145 mmol/L    Potassium 4.8 3.4 - 5.3 mmol/L    Chloride 109 (H) 98 - 107 mmol/L    Carbon Dioxide (CO2) 26 22 - 29 mmol/L    Anion Gap 13 7 - 15 mmol/L    Urea Nitrogen 27.8 (H) 8.0 - 23.0 mg/dL    Creatinine 0.86 0.51 - 0.95 mg/dL    Calcium 10.1 (H) 8.2 - 9.6 mg/dL    Glucose 99 70 - 99 mg/dL    Alkaline Phosphatase 53 35 - 104 U/L    AST 36 0 - 45 U/L    ALT 25 0 - 50 U/L    Protein Total 7.7 6.4 - 8.3 g/dL    Albumin 4.6 3.5 - 5.2 g/dL    Bilirubin Total 0.4 <=1.2 mg/dL    GFR Estimate 61 >60 mL/min/1.73m2   Vitamin D Deficiency    Collection Time: 06/28/23 12:42 PM   Result Value Ref Range    Vitamin D, Total (25-Hydroxy) 49 20 - 75 ug/L   Vitamin B12    Collection Time: 06/28/23 12:42 PM   Result Value Ref Range    Vitamin B12 1,144 232 - 1,245 pg/mL   Albumin Random Urine Quantitative with Creat Ratio    Collection Time: 06/28/23  1:13 PM   Result Value Ref Range    Creatinine Urine mg/dL  [facility]    Gyne Operative Note    Gaby Rose Patient Status:  Outpatient in a Bed    1970 MRN [id number]   Location [place] [address] Attending Maya Nguyen MD   Hosp Day # 0 PCP Ryan Lawrence DO     Pre-Operative Diagnosis: Menorrhagia with irregular cycle [N92.1]  Pelvic pain [R10.2], Essure in situ     Post-Operative Diagnosis: Menorrhagia with irregular cycle [H51. 1]Pelvic pain [R10.2] Essure in situ     Procedure Performed:   TOTAL LAPAROSCOPIC HYSTERECTOMY-BILATERAL SALPINGECTOMY, CYSTOSCOPY    Surgeon(s) and Role:     * Maya Nguyen MD - Primary     * Brigitte Tate DO - Assisting Surgeon     Surgical Findings: normal 12 week uterus; normal fallopian tubes / ovaries. Normal appendix. Cystoscopy revealed bilateral ureteral spill and no bladder injury. Specimen: uterus/cervix  with bilateral fallopian tubes     Estimated Blood Loss: 100 cc    Anesth: GETA    Antibiotics:  2 gram Ancef preop    Complications: none     Condition: stable      PROCEDURE:  After assuring informed consent, the patient was taken to the operating room, where general anesthesia was administered. She was prepped and draped in the usual sterile manner and placed in the dorsal lithotomy position with arms tucked & body supported by green pad ensuring no nerve pressure. Bennett catheter was placed. A speculum was inserted. Cervix was measured with cervical  to 3.5 cm. Uterus sounded to 11 cm thus requesting a 10 cm   AGGIE tip. AGGIE uterine manipulator was placed without difficulty. Surgeon's gloves were exchanged. Attention was then focused to the abdomen. 0.25% Marcaine was injected into below umbilicus then a 10 mm skin incision was made with #11 blade. Blunt dissection of adipose tissue done. Manual elevation of abdominal wall done to allow for maximal anterior wall elevation. 11 mm trocar placed directly into abdomen.  Laparascopic camera confirmed placement 40.6 mg/dL    Albumin Urine mg/L 252.0 mg/L    Albumin Urine mg/g Cr 620.69 (H) 0.00 - 25.00 mg/g Cr        If you have any questions or concerns, please call the clinic at the number listed above.                        within peritoneal cavity. CO2 gas attached & abdomen insufflated to max 15 mmHG. Two 5 mm trocars placed under direct visual guidance in left & right lower quadrants. An additional 10 mm trocar was placed in right mid quadrant under direct visualization. Attention was then focused to the pelvis with the above findings. The right ureter was identified. The Ligasure device was used to cauterize along the right fallopian tube, leaving the right ovary and ovarian blood supply intact. The right ovarian ligament and mesosalpinx were then transected until the level of the round ligament. The right round ligament was cauterized and transected. The anterior leaf was taken down with inferior deflection of the bladder. The right uterine artery was skeletonized and cauterized. Attention was then focused to the left. The left ureter was identified. The same was then done along the left tube with careful attention to leave behind the left ovary and ovarian blood supply. The left ovarian ligament and mesosalpinx were transected, and then the left round ligament was cauterized and transected. The anterior leaf was taken down with inferior deflection of the bladder. The left uterine artery was skeletonized and cauterized. Incision was made over anterior cervicovaginal junction. Once blue of cervical cup noted, vaginal occluder balloon inflated with 60 cc. Then the cervicovaginal junction was transected circumferentially with the monopolar hook. The uterus was removed through the vagina, and hemostasis was ensured. The vaginal cuff was closed with V-loc suture, plicating the ipsilateral uterosacral ligament to the ipsilateral corner. Excellent hemostasis was again noted. A survey of the abdomen demonstrated no injuy to bowel with normal appearing appendix and gallbladder / liver. The pelvis was irrigated and found to be hemostatic as well as bilateral lateral sidewalls.   Neoclose was used to close the 10 mm port sites. The knots tied after pneumoperitoneum released & 5 positive pressure breathes given by anesthesia. The laparoscope and ports were removed. All skin incisions closed with 4-0 Monocryl in a subcuticular fashion. Dermabond applied. Cystoscopy was performed, which revealed bilateral ureteral spill and no bladder injury. The patient tolerated the procedure well. Sponge, lap, needle counts were correct x2. She was taken to post anesthesia recovery in stable condition.   Morenita Meyers MD  4/18/2022 10:22 AM

## 2023-07-18 ENCOUNTER — NURSE ONLY (OUTPATIENT)
Dept: ALLERGY | Facility: CLINIC | Age: 53
End: 2023-07-18

## 2023-07-18 DIAGNOSIS — J30.89 ENVIRONMENTAL AND SEASONAL ALLERGIES: Primary | ICD-10-CM

## 2023-07-18 PROCEDURE — 95117 IMMUNOTHERAPY INJECTIONS: CPT | Performed by: ALLERGY & IMMUNOLOGY

## 2023-07-19 ENCOUNTER — TELEPHONE (OUTPATIENT)
Dept: FAMILY MEDICINE CLINIC | Facility: CLINIC | Age: 53
End: 2023-07-19

## 2023-07-19 NOTE — TELEPHONE ENCOUNTER
Patient calling (identified name and )  states she has a sharp shooting pain on the left lower abdomen near groin area when laying down. This has been an on and off issue for the past 2-3 years. States she previously had an ultrasound prior to her hysterectomy which did not show any cause for the pain. The pain has occurred for the past 2-3 nights more consistently. States she would like to be seen by Dr. Atif Kelly only and it is not an emergency. Next available appointment scheduled. Advised to go to the IC if symptoms becomes constant or worsens. Patient verbalized understanding and agrees with plan.       Future Appointments   Date Time Provider Maeve Yu   2023  6:00 PM [facility]   8/15/2023  1:30 PM [place] MD [facility] SERVICES [facility]

## 2023-08-15 ENCOUNTER — NURSE ONLY (OUTPATIENT)
Dept: ALLERGY | Facility: CLINIC | Age: 53
End: 2023-08-15

## 2023-08-15 ENCOUNTER — OFFICE VISIT (OUTPATIENT)
Dept: ALLERGY | Facility: CLINIC | Age: 53
End: 2023-08-15

## 2023-08-15 VITALS
HEART RATE: 89 BPM | SYSTOLIC BLOOD PRESSURE: 152 MMHG | HEIGHT: 64 IN | OXYGEN SATURATION: 96 % | DIASTOLIC BLOOD PRESSURE: 98 MMHG | BODY MASS INDEX: 34.15 KG/M2 | WEIGHT: 200 LBS

## 2023-08-15 DIAGNOSIS — Z92.29 COVID-19 VACCINE SERIES COMPLETED: ICD-10-CM

## 2023-08-15 DIAGNOSIS — Z91.040 LATEX ALLERGY: ICD-10-CM

## 2023-08-15 DIAGNOSIS — J30.89 ENVIRONMENTAL AND SEASONAL ALLERGIES: Primary | ICD-10-CM

## 2023-08-15 DIAGNOSIS — J98.01 BRONCHOSPASM: ICD-10-CM

## 2023-08-15 DIAGNOSIS — J30.89 PERENNIAL ALLERGIC RHINITIS WITH SEASONAL VARIATION: Primary | ICD-10-CM

## 2023-08-15 DIAGNOSIS — Z23 FLU VACCINE NEED: ICD-10-CM

## 2023-08-15 DIAGNOSIS — J30.2 PERENNIAL ALLERGIC RHINITIS WITH SEASONAL VARIATION: Primary | ICD-10-CM

## 2023-08-15 PROCEDURE — 3077F SYST BP >= 140 MM HG: CPT | Performed by: ALLERGY & IMMUNOLOGY

## 2023-08-15 PROCEDURE — 95117 IMMUNOTHERAPY INJECTIONS: CPT | Performed by: ALLERGY & IMMUNOLOGY

## 2023-08-15 PROCEDURE — 3080F DIAST BP >= 90 MM HG: CPT | Performed by: ALLERGY & IMMUNOLOGY

## 2023-08-15 PROCEDURE — 99214 OFFICE O/P EST MOD 30 MIN: CPT | Performed by: ALLERGY & IMMUNOLOGY

## 2023-08-15 PROCEDURE — 3008F BODY MASS INDEX DOCD: CPT | Performed by: ALLERGY & IMMUNOLOGY

## 2023-08-15 PROCEDURE — 95165 ANTIGEN THERAPY SERVICES: CPT | Performed by: ALLERGY & IMMUNOLOGY

## 2023-08-15 NOTE — PROGRESS NOTES
Yanely Ortiz is a 48year old female. HPI:   No chief complaint on file. Patient is a 48 old female who presents for follow-up with a chief complaint of allergies  Patient has a history of allergic rhinitis and bronchospasm. Patient currently on maintenance dose immunotherapy to dust mites dog mold and trees every 4 weeks. Patient last seen by me in August 2022  Medications list include albuterol  COVID vaccines up-to-date. Today patient reports    Ar:  Active or persistent symptoms: gigi ashby sz   Active meds:  zyrtec prn   pets none   Denies adverse reaction to immunotherapy in the interim. Overall immunotherapy is helping decrease in symptoms as well as need for medications  Ait today     Bronchospasm. No ED visits or prednisone in the interim. Denies symptoms more than 2 days/week.   Albuterol as needed    COVID-vaccine x2 doses  Flu vaccine from last fall up-to-date    Avoids latex   No reactions in interim     Works in real  estate         HISTORY:  Past Medical History:   Diagnosis Date    Anxiety state     Essential hypertension     Helicobacter pylori gastritis 2011    Hiatal hernia     High blood pressure     Hypothyroidism     Insomnia     Sleep apnea     no machine      Past Surgical History:   Procedure Laterality Date    COLONOSCOPY  01/20/2011    COLONOSCOPY N/A 10/9/2020    Procedure: COLONOSCOPY;  Surgeon: Kyle Barclay MD;  Location: [address] [place] [address] ENDOSCOPY    EGD  01/20/2011    HAND/FINGER SURGERY UNLISTED Right 1990    little finger osteotomy    HEMORRHOIDECTOMY,EXTERNAL  10/22/2020    w/ skin tag ex    HYSTERECTOMY  04/18/2022    TLH/BS -- essure intact      Family History   Problem Relation Age of Onset    Diabetes Father         type 1    Heart Disorder Father     Hypertension Father     Other (gout) Father     Other (arthritis) Father     Cancer Mother     Diabetes Mother     Hypertension Mother     Other (arthritis) Mother     Other (psoriasis) Son     Diabetes Brother       Social History:   Social History     Socioeconomic History    Marital status: Single    Number of children: 2   Occupational History    Occupation: realtor   Tobacco Use    Smoking status: Never    Smokeless tobacco: Never   Substance and Sexual Activity    Alcohol use: Yes     Alcohol/week: 2.0 standard drinks of alcohol     Types: 2 Glasses of wine per week    Drug use: No    Sexual activity: Not Currently     Partners: Male     Birth control/protection: Hysterectomy   Other Topics Concern    Caffeine Concern Yes     Comment: coffee 2 cups    Pt has a pacemaker No    Pt has a defibrillator No    Reaction to local anesthetic No        Medications (Active prior to today's visit):  Current Outpatient Medications   Medication Sig Dispense Refill    LEVOTHYROXINE 137 MCG Oral Tab TAKE 1 TABLET BY MOUTH DAILY 90 tablet 3    diazePAM (VALIUM) 5 MG Oral Tab Take 1 tablet (5 mg total) by mouth nightly as needed (TMJ joint dysfunction. ). 15 tablet 0    celecoxib (CELEBREX) 200 MG Oral Cap Take 1 capsule (200 mg total) by mouth daily with dinner. Take with food. This is for pain and inflammation. 30 capsule 0    hydroCHLOROthiazide 25 MG Oral Tab Take 1 tablet (25 mg total) by mouth daily. 90 tablet 1    amLODIPine 5 MG Oral Tab Take 1 tablet (5 mg total) by mouth daily. 90 tablet 1    albuterol (PROAIR HFA) 108 (90 Base) MCG/ACT Inhalation Aero Soln Inhale 2 puffs into the lungs every 4 (four) hours as needed for Wheezing.  1 each 0       Allergies:    Dust Mites              SHORTNESS OF BREATH    Comment:Itchiness and SOB  Cats Claw, Uncaria *    ITCHING  Latex                   ITCHING  Mold                    ITCHING      ROS:   Allergic/Immuno:  See hpi  Cardiovascular:  Negative for irregular heartbeat/palpitations, chest pain, edema  Constitutional:  Negative night sweats,weight loss, irritability and lethargy  ENMT:  Negative for ear drainage, hearing loss and nasal drainage  Eyes:  Negative for eye discharge and vision loss  Gastrointestinal:  Negative for abdominal pain, diarrhea and vomiting  Integumentary:  Negative for pruritus and rash  Respiratory:  Negative for cough, dyspnea and wheezing    PHYSICAL EXAM:   Constitutional: responsive, no acute distress noted  Head/Face: NC/Atraumatic  Eyes/Vision: conjunctiva and lids are normal extraocular motion is intact   Ears/Audiometry: tympanic membranes are normal bilaterally hearing is grossly intact  Nose/Mouth/Throat: nose and throat are clear mucous membranes are moist   Neck/Thyroid: neck is supple without adenopathy  Lymphatic: no abnormal cervical, supraclavicular or axillary adenopathy is noted  Respiratory: normal to inspection lungs are clear to auscultation bilaterally normal respiratory effort   Cardiovascular: regular rate and rhythm no murmurs, gallups, or rubs  Abdomen: soft non-tender non-distended  Skin/Hair: no unusual rashes present   Extremities: no edema, cyanosis, or clubbing     ASSESSMENT/PLAN:   Assessment   Perennial allergic rhinitis with seasonal variation  (primary encounter diagnosis)  Bronchospasm  Covid-19 vaccine series completed  Flu vaccine need      #1 allergic rhinitis  Doing well with current maintenance dose immunotherapy every 4 weeks. Not needing allergy medications. Denies recurrent sinus infections or antibiotics. No adverse reactions to immunotherapy in the interim  Continue with maintenance dose immunotherapy every 4 weeks. Reviewed currently recommended 3 to 5 years of maintenance dosing    #2 bronchospasm  No ED visits or prednisone in the interim. Albuterol as needed. Patient reports rarely needing albuterol. Continue with treatment of underlying environmental allergies. Patient can be posted if needing albuterol having symptoms more than 2 days/week    #3 COVID vaccines completed.   Recommend booster and indicated    #4 flu vaccine recommended this fall    Patient received immunotherapy today followed by 30 minutes of observation without issue or incident       Orders This Visit:  No orders of the defined types were placed in this encounter. Meds This Visit:  Requested Prescriptions      No prescriptions requested or ordered in this encounter       Imaging & Referrals:  None     8/15/2023  Ace Bell MD    If medication samples were provided today, they were provided solely for patient education and training related to self administration of these medications. Teaching, instruction and sample was provided to the patient by myself. Teaching included  a review of potential adverse side effects as well as potential efficacy. Patient's questions were answered in regards to medication administration and dosing and potential side effects.  Teaching was provided via the teach back method

## 2023-08-15 NOTE — PATIENT INSTRUCTIONS
#1 allergic rhinitis  Doing well with current maintenance dose immunotherapy every 4 weeks. Not needing allergy medications. Denies recurrent sinus infections or antibiotics. No adverse reactions to immunotherapy in the interim  Continue with maintenance dose immunotherapy every 4 weeks. Reviewed currently recommended 3 to 5 years of maintenance dosing    #2 bronchospasm  No ED visits or prednisone in the interim. Albuterol as needed. Patient reports rarely needing albuterol. Continue with treatment of underlying environmental allergies. Patient can be posted if needing albuterol having symptoms more than 2 days/week    #3 COVID vaccines completed.   Recommend booster and indicated    #4 flu vaccine recommended this fall    Patient received immunotherapy today followed by 30 minutes of observation without issue or incident

## 2023-08-24 ENCOUNTER — APPOINTMENT (OUTPATIENT)
Dept: CT IMAGING | Age: 53
End: 2023-08-24
Attending: NURSE PRACTITIONER
Payer: COMMERCIAL

## 2023-08-24 ENCOUNTER — HOSPITAL ENCOUNTER (OUTPATIENT)
Age: 53
Discharge: HOME | End: 2023-08-24
Payer: COMMERCIAL

## 2023-08-24 VITALS
SYSTOLIC BLOOD PRESSURE: 139 MMHG | RESPIRATION RATE: 20 BRPM | HEART RATE: 81 BPM | DIASTOLIC BLOOD PRESSURE: 79 MMHG | TEMPERATURE: 97 F | OXYGEN SATURATION: 98 %

## 2023-08-24 DIAGNOSIS — R10.9 ABDOMINAL PAIN OF UNKNOWN ETIOLOGY: Primary | ICD-10-CM

## 2023-08-24 LAB
#MXD IC: 0.5 X10ˆ3/UL (ref 0.1–1)
BILIRUB UR QL STRIP: NEGATIVE
BUN BLD-MCNC: 14 MG/DL (ref 7–18)
CHLORIDE BLD-SCNC: 102 MMOL/L (ref 98–112)
CLARITY UR: CLEAR
CO2 BLD-SCNC: 25 MMOL/L (ref 21–32)
COLOR UR: YELLOW
CREAT BLD-MCNC: 0.6 MG/DL
EGFRCR SERPLBLD CKD-EPI 2021: 107 ML/MIN/1.73M2 (ref 60–?)
GLUCOSE BLD-MCNC: 103 MG/DL (ref 70–99)
GLUCOSE UR STRIP-MCNC: NEGATIVE MG/DL
HCT VFR BLD AUTO: 37.8 %
HCT VFR BLD CALC: 42 %
HGB BLD-MCNC: 12.2 G/DL
HGB UR QL STRIP: NEGATIVE
ISTAT IONIZED CALCIUM FOR CHEM 8: 1.12 MMOL/L (ref 1.12–1.32)
KETONES UR STRIP-MCNC: NEGATIVE MG/DL
LEUKOCYTE ESTERASE UR QL STRIP: NEGATIVE
LYMPHOCYTES # BLD AUTO: 1.4 X10ˆ3/UL (ref 1–4)
LYMPHOCYTES NFR BLD AUTO: 27.6 %
MCH RBC QN AUTO: 31.5 PG (ref 26–34)
MCHC RBC AUTO-ENTMCNC: 32.3 G/DL (ref 31–37)
MCV RBC AUTO: 97.7 FL (ref 80–100)
MIXED CELL %: 10.4 %
NEUTROPHILS # BLD AUTO: 3.3 X10ˆ3/UL (ref 1.5–7.7)
NEUTROPHILS NFR BLD AUTO: 62 %
NITRITE UR QL STRIP: NEGATIVE
PH UR STRIP: 7 [PH]
PLATELET # BLD AUTO: 248 X10ˆ3/UL (ref 150–450)
POTASSIUM BLD-SCNC: 3.8 MMOL/L (ref 3.6–5.1)
PROT UR STRIP-MCNC: NEGATIVE MG/DL
RBC # BLD AUTO: 3.87 X10ˆ6/UL
SODIUM BLD-SCNC: 138 MMOL/L (ref 136–145)
SP GR UR STRIP: 1.01
UROBILINOGEN UR STRIP-ACNC: <2 MG/DL
WBC # BLD AUTO: 5.2 X10ˆ3/UL (ref 4–11)

## 2023-08-24 PROCEDURE — 80047 BASIC METABLC PNL IONIZED CA: CPT

## 2023-08-24 PROCEDURE — 99215 OFFICE O/P EST HI 40 MIN: CPT

## 2023-08-24 PROCEDURE — 36415 COLL VENOUS BLD VENIPUNCTURE: CPT

## 2023-08-24 PROCEDURE — 99214 OFFICE O/P EST MOD 30 MIN: CPT

## 2023-08-24 PROCEDURE — 81002 URINALYSIS NONAUTO W/O SCOPE: CPT

## 2023-08-24 PROCEDURE — 74177 CT ABD & PELVIS W/CONTRAST: CPT | Performed by: NURSE PRACTITIONER

## 2023-08-24 PROCEDURE — 85025 COMPLETE CBC W/AUTO DIFF WBC: CPT | Performed by: NURSE PRACTITIONER

## 2023-08-24 NOTE — ED INITIAL ASSESSMENT (HPI)
Left pelvic pain for 2 years worse in the past few months, no fever, no urinary symptoms, no flank pain, h/o hysterectomy

## 2023-09-05 ENCOUNTER — HOSPITAL ENCOUNTER (OUTPATIENT)
Dept: ULTRASOUND IMAGING | Facility: HOSPITAL | Age: 53
Discharge: HOME | End: 2023-09-05
Attending: FAMILY MEDICINE
Payer: COMMERCIAL

## 2023-09-05 DIAGNOSIS — R92.8 ABNORMAL MAMMOGRAM OF LEFT BREAST: ICD-10-CM

## 2023-09-05 PROCEDURE — 76642 ULTRASOUND BREAST LIMITED: CPT | Performed by: FAMILY MEDICINE

## 2023-09-07 ENCOUNTER — LAB ENCOUNTER (OUTPATIENT)
Dept: LAB | Age: 53
End: 2023-09-07
Attending: FAMILY MEDICINE
Payer: COMMERCIAL

## 2023-09-07 DIAGNOSIS — E78.2 MIXED HYPERLIPIDEMIA: ICD-10-CM

## 2023-09-07 DIAGNOSIS — E03.9 ACQUIRED HYPOTHYROIDISM: ICD-10-CM

## 2023-09-07 LAB
CHOLEST SERPL-MCNC: 248 MG/DL (ref ?–200)
FASTING PATIENT LIPID ANSWER: YES
HDLC SERPL-MCNC: 63 MG/DL (ref 40–59)
LDLC SERPL CALC-MCNC: 165 MG/DL (ref ?–100)
NONHDLC SERPL-MCNC: 185 MG/DL (ref ?–130)
TRIGL SERPL-MCNC: 114 MG/DL (ref 30–149)
TSI SER-ACNC: 2.74 MIU/ML (ref 0.36–3.74)
VLDLC SERPL CALC-MCNC: 22 MG/DL (ref 0–30)

## 2023-09-07 PROCEDURE — 36415 COLL VENOUS BLD VENIPUNCTURE: CPT

## 2023-09-07 PROCEDURE — 80061 LIPID PANEL: CPT

## 2023-09-07 PROCEDURE — 84443 ASSAY THYROID STIM HORMONE: CPT

## 2023-09-11 ENCOUNTER — NURSE ONLY (OUTPATIENT)
Dept: ALLERGY | Facility: CLINIC | Age: 53
End: 2023-09-11

## 2023-09-11 DIAGNOSIS — J30.89 ENVIRONMENTAL AND SEASONAL ALLERGIES: Primary | ICD-10-CM

## 2023-09-28 ENCOUNTER — OFFICE VISIT (OUTPATIENT)
Dept: FAMILY MEDICINE CLINIC | Facility: CLINIC | Age: 53
End: 2023-09-28

## 2023-09-28 VITALS
BODY MASS INDEX: 34.31 KG/M2 | TEMPERATURE: 98 F | WEIGHT: 201 LBS | DIASTOLIC BLOOD PRESSURE: 115 MMHG | SYSTOLIC BLOOD PRESSURE: 152 MMHG | HEIGHT: 64 IN | HEART RATE: 85 BPM

## 2023-09-28 DIAGNOSIS — E78.2 MIXED HYPERLIPIDEMIA: ICD-10-CM

## 2023-09-28 DIAGNOSIS — G89.29 CHRONIC HIP PAIN, LEFT: ICD-10-CM

## 2023-09-28 DIAGNOSIS — M25.552 CHRONIC HIP PAIN, LEFT: ICD-10-CM

## 2023-09-28 DIAGNOSIS — I10 WHITE COAT SYNDROME WITH DIAGNOSIS OF HYPERTENSION: ICD-10-CM

## 2023-09-28 DIAGNOSIS — E03.9 ACQUIRED HYPOTHYROIDISM: ICD-10-CM

## 2023-09-28 DIAGNOSIS — I10 ESSENTIAL HYPERTENSION: Primary | ICD-10-CM

## 2023-09-28 DIAGNOSIS — Z23 INFLUENZA VACCINE NEEDED: ICD-10-CM

## 2023-09-28 DIAGNOSIS — M70.62 GREATER TROCHANTERIC BURSITIS OF LEFT HIP: ICD-10-CM

## 2023-09-28 PROCEDURE — 90471 IMMUNIZATION ADMIN: CPT | Performed by: FAMILY MEDICINE

## 2023-09-28 PROCEDURE — 90686 IIV4 VACC NO PRSV 0.5 ML IM: CPT | Performed by: FAMILY MEDICINE

## 2023-09-28 PROCEDURE — 3077F SYST BP >= 140 MM HG: CPT | Performed by: FAMILY MEDICINE

## 2023-09-28 PROCEDURE — 3080F DIAST BP >= 90 MM HG: CPT | Performed by: FAMILY MEDICINE

## 2023-09-28 PROCEDURE — 99214 OFFICE O/P EST MOD 30 MIN: CPT | Performed by: FAMILY MEDICINE

## 2023-09-28 PROCEDURE — 3008F BODY MASS INDEX DOCD: CPT | Performed by: FAMILY MEDICINE

## 2023-09-28 RX ORDER — ATORVASTATIN CALCIUM 10 MG/1
10 TABLET, FILM COATED ORAL NIGHTLY
Qty: 90 TABLET | Refills: 1 | Status: SHIPPED | OUTPATIENT
Start: 2023-09-28 | End: 2024-01-26

## 2023-09-28 NOTE — PATIENT INSTRUCTIONS
Lookup on the Internet to stretch out your left iliotibial band to help the greater trochanteric bursitis.

## 2023-10-10 ENCOUNTER — APPOINTMENT (OUTPATIENT)
Dept: ALLERGY | Facility: CLINIC | Age: 53
End: 2023-10-10

## 2023-10-10 DIAGNOSIS — J30.89 ENVIRONMENTAL AND SEASONAL ALLERGIES: Primary | ICD-10-CM

## 2023-10-10 PROCEDURE — 95117 IMMUNOTHERAPY INJECTIONS: CPT | Performed by: ALLERGY & IMMUNOLOGY

## 2023-10-15 DIAGNOSIS — R68.84 JAW PAIN: ICD-10-CM

## 2023-10-15 DIAGNOSIS — M54.2 BILATERAL POSTERIOR NECK PAIN: ICD-10-CM

## 2023-10-15 DIAGNOSIS — S46.819A STRAIN OF TRAPEZIUS MUSCLE, UNSPECIFIED LATERALITY, INITIAL ENCOUNTER: ICD-10-CM
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
## 2023-10-15 DIAGNOSIS — M26.609 TMJ DYSFUNCTION: ICD-10-CM

## 2023-10-16 NOTE — TELEPHONE ENCOUNTER
Please review. Protocol failed / Has no protocol.      Requested Prescriptions   Pending Prescriptions Disp Refills    DIAZEPAM 5 MG Oral Tab [Pharmacy Med Name: DIAZEPAM 5MG TABLETS] 15 tablet 0     Sig: TAKE 1 TABLET BY MOUTH NIGHTLY AS NEEDED FOR TMJ JOINT DYSFUNCTION       There is no refill protocol information for this order       CYCLOBENZAPRINE 10 MG Oral Tab [Pharmacy Med Name: CYCLOBENZAPRINE 10MG TABLETS] 30 tablet 0     Sig: TAKE ONE TABLET BY MOUTH NIGHTLY AS NEEDED FOR MUSCLE RELXATION       There is no refill protocol information for this order        Future Appointments         Provider Department Appt Notes    In 3 weeks  [facility], [address] [place] [address] [place] 4 week allergy injection    In 1 month  [facility], [address] [place] [address] 4 week allergy injection           Recent Outpatient Visits              2 weeks ago Essential hypertension    [address], [address], [address], [place],     Office Visit    1 month ago Environmental and seasonal allergies    [facility], [address] [place] [address] [place]    Nurse Only    2 months ago Environmental and seasonal allergies    [facility], [address] [place] [address] [place]    Nurse Only    2 months ago Perennial allergic rhinitis with seasonal variation    [address] [facility] Bailey Tineo MD    Office Visit    3 months ago Environmental and seasonal allergies    [facility], [address] [place] [address]    Nurse Only

## 2023-10-17 RX ORDER — CYCLOBENZAPRINE HCL 10 MG
10 TABLET ORAL NIGHTLY PRN
Qty: 30 TABLET | Refills: 0 | Status: SHIPPED | OUTPATIENT
Start: 2023-10-17

## 2023-10-17 RX ORDER — DIAZEPAM 5 MG/1
5 TABLET ORAL NIGHTLY PRN
Qty: 15 TABLET | Refills: 0 | Status: SHIPPED | OUTPATIENT
Start: 2023-10-17

## 2023-11-07 ENCOUNTER — NURSE ONLY (OUTPATIENT)
Dept: ALLERGY | Facility: CLINIC | Age: 53
End: 2023-11-07

## 2023-11-07 DIAGNOSIS — J30.89 ENVIRONMENTAL AND SEASONAL ALLERGIES: Primary | ICD-10-CM

## 2023-11-07 PROCEDURE — 95117 IMMUNOTHERAPY INJECTIONS: CPT | Performed by: ALLERGY & IMMUNOLOGY

## 2023-12-05 ENCOUNTER — NURSE ONLY (OUTPATIENT)
Dept: ALLERGY | Facility: CLINIC | Age: 53
End: 2023-12-05

## 2023-12-05 DIAGNOSIS — J30.89 ENVIRONMENTAL AND SEASONAL ALLERGIES: Primary | ICD-10-CM

## 2023-12-05 PROCEDURE — 95117 IMMUNOTHERAPY INJECTIONS: CPT | Performed by: ALLERGY & IMMUNOLOGY

## 2023-12-26 DIAGNOSIS — I10 ESSENTIAL HYPERTENSION: ICD-10-CM

## 2023-12-26 NOTE — TELEPHONE ENCOUNTER
Pharmacy requesting refill      hydroCHLOROthiazide 25 MG Oral Tab, Take 1 tablet (25 mg total) by mouth daily. , Disp: 90 tablet, Rfl: 1

## 2023-12-27 RX ORDER — HYDROCHLOROTHIAZIDE 25 MG/1
25 TABLET ORAL DAILY
Qty: 90 TABLET | Refills: 2 | Status: SHIPPED | OUTPATIENT
Start: 2023-12-27

## 2023-12-28 NOTE — TELEPHONE ENCOUNTER
Please review. Protocol failed / Has no protocol. Requested Prescriptions   Pending Prescriptions Disp Refills    hydroCHLOROthiazide 25 MG Oral Tab 90 tablet 3     Sig: Take 1 tablet (25 mg total) by mouth daily. Hypertensive Medications Protocol Failed - 12/26/2023  4:52 PM        Failed - Last BP reading less than 140/90     BP Readings from Last 1 Encounters:   09/28/23 (!) 152/115               Failed - CMP or BMP in past 6 months     No results found for this or any previous visit (from the past 4392 hour(s)).             Passed - In person appointment in the past 12 or next 3 months     Recent Outpatient Visits              3 weeks ago Environmental and seasonal allergies    [facility], [address] [place], [place] [address]    Nurse Only    1 month ago Environmental and seasonal allergies    [facility], [address] [place], [place] [address]    Nurse Only    3 months ago Essential hypertension    [address], [address], [address], [facility]    Office Visit    3 months ago Environmental and seasonal allergies    [facility]    Nurse Only    4 months ago Environmental and seasonal allergies    [address], [address] [facility]    Nurse Only          Future Appointments         Provider Department Appt Notes    In 4 weeks Yesi Thomas MD [address], [place] flare up [employer] - In person appointment or virtual visit in the past 6 months     Recent Outpatient Visits              3 weeks ago Environmental and seasonal allergies    [facility], [address] [facility]    Nurse Only    1 month ago Environmental and seasonal allergies    [facility], [address] [facility]    Nurse Only    3 months ago Essential hypertension    [address], [address], [address], [place], [place]    Office Visit 3 months ago [facility] and seasonal [facility], [address], [place]    Nurse Only    4 months ago Environmental and seasonal allergies    [facility], Randy Mcgarry    Nurse Only          Future Appointments         Provider Department Appt Notes    In 4 weeks MD Lakesha Garibay, [address], [place] flare up               [facility] or GFRNAA > 50     GFR Evaluation  EGFRCR: 107 , resulted on 8/24/2023             Future Appointments         Provider Department Appt Notes    In 4 weeks MD [facility], [address], [place] flare up           Recent Outpatient Visits              3 weeks ago [facility], [address], [place]    Nurse Only    1 month ago [facility] and seasonal [facility]    [facility]    Nurse Only    3 months ago Essential hypertension    [address], [address], [address] Brittnee Qureshi DO    Office Visit    3 months ago Environmental and seasonal allergies    [facility], Eric Mcgarry    Nurse Only    4 months ago Environmental and seasonal allergies    [address] Only

## 2024-01-03 ENCOUNTER — TELEPHONE (OUTPATIENT)
Dept: OBGYN CLINIC | Facility: CLINIC | Age: 54
End: 2024-01-03

## 2024-01-03 NOTE — TELEPHONE ENCOUNTER
Pt calling to reports she has genital warts that [facility] has removed. Warts have grown back. She states [facility] \"cut them out with a razor\" in the past. Pt requesting to be seen for this.   Next available 20 min slot 1/22/24, pt requesting message to [facility] to see if she can be added sooner. She is aware [facility] out until Monday.   To [facility] to advise.

## 2024-01-03 NOTE — TELEPHONE ENCOUNTER
Patient have skin tags around the vagina area, patient states Dr Yun removed them, they have returned.  Patient request to be seen by Dr. Yun, no soon appointments are available, request a nurse to call to scheduled appointment   Please advise

## 2024-01-04 ENCOUNTER — TELEPHONE (OUTPATIENT)
Dept: ALLERGY | Facility: CLINIC | Age: 54
End: 2024-01-04

## 2024-01-04 NOTE — TELEPHONE ENCOUNTER
Patient calling to ask to if getting allergy shot on 01/09/24 will throw off allergy shot schedule, requesting call back.

## 2024-01-04 NOTE — TELEPHONE ENCOUNTER
Patient contacted via telephone.     Patient informed that 1/9/2024 would be 35 days after last AIT top maintenance dose injections and would not need to to have AIT dosage reduced.     Patient scheduled additional AIT injection appts and verbalized understanding of information.

## 2024-01-08 NOTE — TELEPHONE ENCOUNTER
Needs to be done at [facility] -- can do on call if pt aware she may need to wait at time of visit for me to return from hospital

## 2024-01-09 ENCOUNTER — NURSE ONLY (OUTPATIENT)
Dept: ALLERGY | Facility: CLINIC | Age: 54
End: 2024-01-09

## 2024-01-09 DIAGNOSIS — J30.89 ENVIRONMENTAL AND SEASONAL ALLERGIES: Primary | ICD-10-CM

## 2024-01-09 PROCEDURE — 95165 ANTIGEN THERAPY SERVICES: CPT | Performed by: ALLERGY & IMMUNOLOGY

## 2024-01-09 PROCEDURE — 95117 IMMUNOTHERAPY INJECTIONS: CPT | Performed by: ALLERGY & IMMUNOLOGY

## 2024-01-18 ENCOUNTER — OFFICE VISIT (OUTPATIENT)
Dept: OBGYN CLINIC | Facility: CLINIC | Age: 54
End: 2024-01-18

## 2024-01-18 VITALS
WEIGHT: 201.81 LBS | HEART RATE: 66 BPM | BODY MASS INDEX: 35 KG/M2 | DIASTOLIC BLOOD PRESSURE: 88 MMHG | SYSTOLIC BLOOD PRESSURE: 127 MMHG

## 2024-01-18 DIAGNOSIS — A63.0: ICD-10-CM

## 2024-01-18 DIAGNOSIS — N90.89 VULVAR LESION: Primary | ICD-10-CM

## 2024-01-18 PROCEDURE — 3074F SYST BP LT 130 MM HG: CPT | Performed by: OBSTETRICS & GYNECOLOGY

## 2024-01-18 PROCEDURE — 3079F DIAST BP 80-89 MM HG: CPT | Performed by: OBSTETRICS & GYNECOLOGY

## 2024-01-18 PROCEDURE — 56501 DESTROY VULVA LESIONS SIM: CPT | Performed by: OBSTETRICS & GYNECOLOGY

## 2024-01-19 ENCOUNTER — OFFICE VISIT (OUTPATIENT)
Dept: DERMATOLOGY CLINIC | Facility: CLINIC | Age: 54
End: 2024-01-19
Payer: COMMERCIAL

## 2024-01-19 DIAGNOSIS — L30.9 DERMATITIS: Primary | ICD-10-CM

## 2024-01-19 RX ORDER — TRIAMCINOLONE ACETONIDE 1 MG/G
1 CREAM TOPICAL 2 TIMES DAILY
Qty: 80 G | Refills: 2 | Status: SHIPPED | OUTPATIENT
Start: 2024-01-19

## 2024-01-19 RX ORDER — TRETINOIN 0.5 MG/G
CREAM TOPICAL
Qty: 20 G | Refills: 3 | Status: SHIPPED | OUTPATIENT
Start: 2024-01-19

## 2024-01-19 RX ORDER — DESONIDE 0.5 MG/G
CREAM TOPICAL
Qty: 15 G | Refills: 0 | Status: SHIPPED | OUTPATIENT
Start: 2024-01-19

## 2024-01-19 NOTE — PROCEDURES
Vulvar Biopsy     Birth control method(s) used:      Consent signed.  Procedure discussed with patient in detail including indication, risk, benefits, alternatives and complications.    Lesion Description: right labial majora 1 mm wart vs skin tag & left labial majora flat 1/2 cm lesion        Procedure:  Betadine wash of procedural site.  1% lidocaine without epinephrine used topically for local anesthesia.  Lesion removal  done using  dermascalpel  of area.  Silver nitrate used to achieve hemostasis.  Good hemostasis noted.  Patient tolerated procedure well.      Plan:  Site care discussed with patient.  Neosporin twice a day.    Follow-up in 2 weeks.

## 2024-01-21 DIAGNOSIS — S46.819A STRAIN OF TRAPEZIUS MUSCLE, UNSPECIFIED LATERALITY, INITIAL ENCOUNTER: ICD-10-CM
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
## 2024-01-21 DIAGNOSIS — R68.84 JAW PAIN: ICD-10-CM

## 2024-01-21 DIAGNOSIS — M26.609 TMJ DYSFUNCTION: ICD-10-CM

## 2024-01-21 DIAGNOSIS — M54.2 BILATERAL POSTERIOR NECK PAIN: ICD-10-CM

## 2024-01-22 ENCOUNTER — TELEPHONE (OUTPATIENT)
Dept: OBGYN CLINIC | Facility: CLINIC | Age: 54
End: 2024-01-22

## 2024-01-22 ENCOUNTER — TELEPHONE (OUTPATIENT)
Dept: DERMATOLOGY CLINIC | Facility: CLINIC | Age: 54
End: 2024-01-22

## 2024-01-22 NOTE — PROGRESS NOTES
Binta Millan is a 53 year old female.  HPI:     CC:    Chief Complaint   Patient presents with    Derm Problem     LOV 1/11/2021.  Red spot under left eye that has dry skin on top of it. Started 2-1/2 months ago. No symptoms. Using moisturizer and exfoliate it.     Pt denies personal/family hx of skin cancer.          Allergies:  Dust mites; Cats claw, uncaria tomentosa; Latex; and Mold    HISTORY:    Past Medical History:   Diagnosis Date    Anxiety state     Essential hypertension     Helicobacter pylori gastritis 2011    Hiatal hernia     High blood pressure     Hypothyroidism     Insomnia     Sleep apnea     no machine      Past Surgical History:   Procedure Laterality Date    COLONOSCOPY  01/20/2011    COLONOSCOPY N/A 10/9/2020    Procedure: COLONOSCOPY;  Surgeon: Sumeet Echeverria MD;  Location: [facility] ENDOSCOPY    EGD  01/20/2011    HAND/FINGER SURGERY UNLISTED Right 1990    little finger osteotomy    HEMORRHOIDECTOMY,EXTERNAL  10/22/2020    w/ skin tag ex    HYSTERECTOMY  04/18/2022    [facility] -- essure intact      Family History   Problem Relation Age of Onset    Diabetes Father         type 1    Heart Disorder Father     Hypertension Father     Other (gout) Father     Other (arthritis) Father     Cancer Mother     Diabetes Mother     Hypertension Mother     Other (arthritis) Mother     Other (psoriasis) Son     Diabetes Brother       Social History     Socioeconomic History    Marital status: Single    Number of children: 2   Occupational History    Occupation: realtor   Tobacco Use    Smoking status: Never     Passive exposure: Never    Smokeless tobacco: Never   Substance and Sexual Activity    Alcohol use: Yes     Alcohol/week: 2.0 standard drinks of alcohol     Types: 2 Glasses of wine per week    Drug use: No    Sexual activity: Not Currently     Partners: Male     Birth control/protection: Hysterectomy   Other Topics Concern    Caffeine Concern Yes     Comment: coffee 2 cups    Pt has a pacemaker No     Pt has a defibrillator No    Reaction to local anesthetic No        Current Outpatient Medications   Medication Sig Dispense Refill    desonide 0.05 % External Cream Apply sparingly and rub gently into the affected areas of eczema around the eyes 15 g 0    triamcinolone 0.1 % External Cream Apply 1 Application topically 2 (two) times daily. To dry itchy skin 80 g 2    Tretinoin 0.05 % External Cream Apply a small to face as directed at bedtime. 20 g 3    hydroCHLOROthiazide 25 MG Oral Tab Take 1 tablet (25 mg total) by mouth daily. 90 tablet 2    diazePAM 5 MG Oral Tab Take 1 tablet (5 mg total) by mouth nightly as needed (TMJ dysfunction.). 15 tablet 0    cyclobenzaprine 10 MG Oral Tab Take 1 tablet (10 mg total) by mouth nightly as needed for Muscle spasms. *can make tired* 30 tablet 0    diclofenac 1 % External Gel Apply 2 g topically 2 (two) times daily as needed. To affected area 100 g 1    atorvastatin (LIPITOR) 10 MG Oral Tab Take 1 tablet (10 mg total) by mouth nightly. For cholesterol. 90 tablet 1    LEVOTHYROXINE 137 MCG Oral Tab TAKE 1 TABLET BY MOUTH DAILY 90 tablet 3    celecoxib (CELEBREX) 200 MG Oral Cap Take 1 capsule (200 mg total) by mouth daily with dinner. Take with food. This is for pain and inflammation. 30 capsule 0    amLODIPine 5 MG Oral Tab Take 1 tablet (5 mg total) by mouth daily. 90 tablet 1    albuterol (PROAIR HFA) 108 (90 Base) MCG/ACT Inhalation Aero Soln Inhale 2 puffs into the lungs every 4 (four) hours as needed for Wheezing. 1 each 0     Allergies:   Allergies   Allergen Reactions    Dust Mites SHORTNESS OF BREATH     Itchiness and SOB    Cats Claw, Uncaria Tomentosa ITCHING    Latex ITCHING    Mold ITCHING       Past Medical History:   Diagnosis Date    Anxiety state     Essential hypertension     Helicobacter pylori gastritis 2011    Hiatal hernia     High blood pressure     Hypothyroidism     Insomnia     Sleep apnea     no machine     Past Surgical History:   Procedure  Laterality Date    COLONOSCOPY  01/20/2011    COLONOSCOPY N/A 10/9/2020    Procedure: COLONOSCOPY;  Surgeon: Sumeet Echeverria MD;  Location: [facility] ENDOSCOPY    EGD  01/20/2011    HAND/FINGER SURGERY UNLISTED Right 1990    little finger osteotomy    HEMORRHOIDECTOMY,EXTERNAL  10/22/2020    w/ skin tag ex    HYSTERECTOMY  04/18/2022    [facility]/BS -- essure intact     Social History     Socioeconomic History    Marital status: Single     Spouse name: Not on file    Number of children: 2    Years of education: Not on file    Highest education level: Not on file   Occupational History    Occupation: realtor   Tobacco Use    Smoking status: Never     Passive exposure: Never    Smokeless tobacco: Never   Vaping Use    Vaping Use: Not on file   Substance and Sexual Activity    Alcohol use: Yes     Alcohol/week: 2.0 standard drinks of alcohol     Types: 2 Glasses of wine per week    Drug use: No    Sexual activity: Not Currently     Partners: Male     Birth control/protection: Hysterectomy   Other Topics Concern     Service Not Asked    Blood Transfusions Not Asked    Caffeine Concern Yes     Comment: coffee 2 cups    Occupational Exposure Not Asked    Hobby Hazards Not Asked    Sleep Concern Not Asked    Stress Concern Not Asked    Weight Concern Not Asked    Special Diet Not Asked    Back Care Not Asked    Exercise Not Asked    Bike Helmet Not Asked    Seat Belt Not Asked    Self-Exams Not Asked    Grew up on a farm Not Asked    History of tanning Not Asked    Outdoor occupation Not Asked    Pt has a pacemaker No    Pt has a defibrillator No    Breast feeding Not Asked    Reaction to local anesthetic No   Social History Narrative    Not on file     Social Determinants of Health     Financial Resource Strain: Not on file   Food Insecurity: Not on file   Transportation Needs: Not on file   Physical Activity: Not on file   Stress: Not on file   Social Connections: Not on file   Housing Stability: Not on file     Family History    Problem Relation Age of Onset    Diabetes Father         type 1    Heart Disorder Father     Hypertension Father     Other (gout) Father     Other (arthritis) Father     Cancer Mother     Diabetes Mother     Hypertension Mother     Other (arthritis) Mother     Other (psoriasis) Son     Diabetes Brother        There were no vitals filed for this visit.    HPI:  Chief Complaint   Patient presents with    Derm Problem     LOV 1/11/2021.  Red spot under left eye that has dry skin on top of it. Started 2-1/2 months ago. No symptoms. Using moisturizer and exfoliate it.     Pt denies personal/family hx of skin cancer.      Past notes/ records and appropriate/relevant lab results including pathology and past body maps reviewed. Updated and new information noted in current visit.     Lesion on her eye has been present for quite some time scaling, uses moisturizer exfoliates it without improvement not particularly itchy slightly better no  Patient does get allergy shots no antihistamines.  Nothing is really changed has been fine  Patient concern with changing lesions.    Also complains of irritation around the eyes itching.  Puffiness swelling.  Recently developed allergies.  Not use anything topically.    History of acne request refill of tretinoin, history of pityriasis rosea has triamcinolone at home not sure if she needs to use this.  Concerned with numerous brown spots, cherry angiomas  Patient presents with concerns above.    Patient has been in their usual state of health.  History, medications, allergies reviewed as noted.      ROS:  Denies any other systemic complaints.  No new or changeing lesions other than noted above. No fevers, chills, night sweats, unusual sun sensitivity.  No other skin complaints.        History, medications, allergies reviewed as noted.       Physical Examination:     Well-developed well-nourished patient alert oriented in no acute distress.  Exam performed, including scalp, head, neck,  face,nails, hair, external eyes, including conjunctival mucosa, eyelids, lips external ears , arms, digits,palms.     Multiple light to medium brown, well marginated, uniformly pigmented, macules and papules 6 mm and less scattered on exam. pigmented lesions examined with dermoscopy benign-appearing patterns.     Waxy tannish keratotic papules scattered, cherry-red vascular papules scattered.    See map today's date for lesions noted .  See assessment and plan below for specific lesions.    Otherwise remarkable for lesions as noted on map.      Assessment / plan:    No orders of the defined types were placed in this encounter.      Meds & Refills for this Visit:  Requested Prescriptions     Signed Prescriptions Disp Refills    desonide 0.05 % External Cream 15 g 0     Sig: Apply sparingly and rub gently into the affected areas of eczema around the eyes    triamcinolone 0.1 % External Cream 80 g 2     Sig: Apply 1 Application topically 2 (two) times daily. To dry itchy skin    Tretinoin 0.05 % External Cream 20 g 3     Sig: Apply a small to face as directed at bedtime.         Encounter Diagnosis   Name Primary?    Dermatitis Yes       Erythematous psoriasiform patch at left lower lid minimal scale  Likely atopic dermatitis possible ectopic contact allergic or irritant reaction.  Discussed.    Dermatitis.  Meds in grid.  Skin care instructions reviewed.  [place] use of emollients.  Pathophysiology reviewed.  Consider Contac allergy in differential.  Consider patch testing.  Patient will let us know how they are doing over the next several weeks.  Await clinical response to above therapies.  Desonide twice daily initially,  Consider alternative agent  Tretinoin as keratolytic      Patient with more generalized eczematous, itchy skin May use triamcinolone twice daily as needed  Continue follow-up with allergy    No other susupicious lesions on todays  exam.    Compound nevi removed left posterior shoulder, left  preauricular cheek benign no atypia 1/21    Multiple cherry angiomas vascular papules over the chest, pedunculated papules, tan-brown papules keratotic over the chest, bra line cautery.  Risk of scarring recurrence of lesions new lesions developing discussed.    Multiple nevi no other suspicious lesions.    Few scattered acne lesions.  Continue tretinoin acne. See medications in grid.  Skin care regimen discussed at length including cleansers, makeup, face washing, sunscreen.  Recheck in 6 -8 weeks if no improvement.  Notify us promptly if problems tolerating regimen.  Consider more aggressive therapy if not responding.    Eyelid dermatitis mild eczematous changes bilaterally more prominent edema lower lids, upper lids left greater than right.  Likely related to seasonal allergies.  Cortisporin ointment, Patanol drops on eyelids muses in the eye as well.  Follow-up with allergy.  Continue antihistamines consistently    Please refer to map for specific lesions.  See additional diagnoses.  Pros cons of various therapies, risks benefits discussed.Pathophysiology discussed with patient.  Therapeutic options reviewed.  See  Medications in grid.  Instructions reviewed at length.    Benign nevi, seborrheic  keratoses, cherry angiomas:  Reassurance regarding other benign skin lesions.Signs and symptoms of skin cancer, ABCDE's of melanoma discussed with patient. Sunscreen use, sun protection, self exams reviewed.  Followup as noted RTC ---routine checkup    6 mos -one year or p.r.n.    The patient indicates understanding of these issues and agrees to the plan.  The patient is asked to return as noted in follow-up/ above.    This note was generated using Dragon voice recognition software.  Please contact me regarding any confusion resulting from errors in recognition..

## 2024-01-22 NOTE — TELEPHONE ENCOUNTER
Medication PA Requested:   Tretinoin 0.05 % External Cream                                                        CoverMyMeds Used:  Key:  Quantity:  20 g   Day Supply:  Sig: Apply a small amount to face as directed at bedtime  DX Code:   L70.0  Acne vulgaris                                  CPT code (if applicable):   Case Number/Pending Ref#:     Thank you!

## 2024-01-23 NOTE — TELEPHONE ENCOUNTER
Please review; protocol failed/ has no protocol    Requested Prescriptions   Pending Prescriptions Disp Refills    DIAZEPAM 5 MG Oral Tab [Pharmacy Med Name: DIAZEPAM 5MG TABLETS] 15 tablet 0     Sig: TAKE 1 TABLET BY MOUTH NIGHTLY AS NEEDED TMJ DYSFUNCTION       There is no refill protocol information for this order       CYCLOBENZAPRINE 10 MG Oral Tab [Pharmacy Med Name: CYCLOBENZAPRINE 10MG TABLETS] 30 tablet 0     Sig: TAKE 1 TABLET BY MOUTH NIGHTLY AS NEEDED FOR MUSCLE SPASMS.*CAN MAKE YOU TIRED*       There is no refill protocol information for this order        Recent Outpatient Visits              4 days ago Dermatitis    [facility] Alessandra Acosta MD    Office Visit    5 days ago Vulvar lesion    [facility] - OB/GYN Karen Yun MD    Office Visit    2 weeks ago Environmental and seasonal allergies    [facility]    Nurse Only    1 month ago Environmental and seasonal allergies    [facility]    Nurse Only    2 months ago Environmental and seasonal allergies    [facility]    Nurse Only          Future Appointments         Provider Department Appt Notes    In 2 weeks [facility] Allergy Injection    In 2 weeks Karen Yun MD [facility] - OB/GYN follow up    In 1 month [facility] Allergy Injection    In 2 months [facility] Allergy Injection    In 2 months Karen Yun MD [facility] - OB/GYN Annual    In 3 months [facility] Allergy Injection

## 2024-01-23 NOTE — TELEPHONE ENCOUNTER
Pt aware of result. Pts. Questions answered. Pt advised to write down additional questions for appt with [facility].

## 2024-01-23 NOTE — TELEPHONE ENCOUNTER
Label from lab -- if it was \"normal\" then no findings at all would have occurred -- this is genital warts -- which cannot be labeled as \"normal\" result

## 2024-01-23 NOTE — TELEPHONE ENCOUNTER
Pt aware of results. Pt still has questions would like to know why the result is reading \"Abnormal\". Pt states she is very concerned that the result is abnormal. Pt would like [facility] to tell her why is states Abnormal on the results.  Please see Lab results from 1/18/24  Pt aware message will be given to [facility] for further information on results.

## 2024-01-24 DIAGNOSIS — E78.2 MIXED HYPERLIPIDEMIA: ICD-10-CM

## 2024-01-25 RX ORDER — CYCLOBENZAPRINE HCL 10 MG
10 TABLET ORAL NIGHTLY PRN
Qty: 30 TABLET | Refills: 0 | Status: SHIPPED | OUTPATIENT
Start: 2024-01-25

## 2024-01-25 RX ORDER — ATORVASTATIN CALCIUM 10 MG/1
10 TABLET, FILM COATED ORAL NIGHTLY
Qty: 90 TABLET | Refills: 0 | Status: SHIPPED | OUTPATIENT
Start: 2024-01-25

## 2024-01-25 RX ORDER — DIAZEPAM 5 MG/1
5 TABLET ORAL NIGHTLY PRN
Qty: 15 TABLET | Refills: 0 | Status: SHIPPED | OUTPATIENT
Start: 2024-01-25

## 2024-01-25 NOTE — TELEPHONE ENCOUNTER
Please review; protocol failed/No Protocol  A.P Avanashiappa Silkt message sent to patient to complete labs.   Requested Prescriptions   Pending Prescriptions Disp Refills    ATORVASTATIN 10 MG Oral Tab [Pharmacy Med Name: ATORVASTATIN 10MG TABLETS] 90 tablet 1     Sig: TAKE 1 TABLET(10 MG) BY MOUTH EVERY NIGHT FOR CHOLESTEROL       Cholesterol Medication Protocol Failed - 1/24/2024  4:09 PM        Failed - ALT in past 12 months        Failed - Last ALT < 80     Lab Results   Component Value Date    ALT 30 01/12/2023             Failed - Last LDL < 130     Lab Results   Component Value Date     (H) 09/07/2023             Passed - LDL in past 12 months        Passed - In person appointment or virtual visit in the past 12 mos or appointment in next 3 mos     Recent Outpatient Visits              6 days ago Dermatitis    [facility] Alessandra Acosta MD    Office Visit    1 week ago Vulvar lesion    [facility] - OB/GYN Karen Yun MD    Office Visit    2 weeks ago Environmental and seasonal allergies    [facility]    Nurse Only    1 month ago Environmental and seasonal allergies    [facility]    Nurse Only    2 months ago Environmental and seasonal allergies    [facility]    Nurse Only          Future Appointments         Provider Department Appt Notes    In 5 days Karen Yun MD [facility] - OB/GYN Follow up visit    In 1 week [facility] Allergy Injection    In 1 month [facility] Allergy Injection    In 2 months [facility] Allergy Injection    In 2 months Karen Yun MD [facility] - [facility] Annual    In 3 months [facility] [place] Allergy Injection                  Future Appointments         Provider Department Appt Notes    In 5 days [facility] MD [facility] - [facility] Follow up visit    In 1 week [facility] [place] Allergy Injection    In 1 month [facility] [place] Allergy Injection    In 2 months [facility] [place] Allergy Injection    In 2 months [facility] MD [facility] - OB[facility] Annual    In 3 months [facility] [place] Allergy Injection          Recent Outpatient Visits              6 days ago Dermatitis    [facility] [place] Alessandra Acosta MD    Office Visit    1 week ago Vulvar lesion    [facility] Karen Yun MD    Office Visit    2 weeks ago Environmental and seasonal allergies    [facility]    Nurse Only    1 month ago Environmental and seasonal allergies    [facility]    Nurse Only    2 months ago Environmental and seasonal allergies    [facility]    Nurse Only

## 2024-01-31 NOTE — TELEPHONE ENCOUNTER
Medication PA Requested:   Tretinoin 0.05 % External Cream                                                        CoverMyMeds Used: no  Key:  Quantity:  20 g   Day Supply:  Sig: Apply a small amount to face as directed at bedtime  DX Code:   L70.0  Acne vulgaris       CPT code (if applicable):   Case Number/Pending Ref#:    EPA submitted, LOV 1/19  Awaiting determination

## 2024-02-06 ENCOUNTER — LAB ENCOUNTER (OUTPATIENT)
Dept: LAB | Age: 54
End: 2024-02-06
Attending: FAMILY MEDICINE
Payer: COMMERCIAL

## 2024-02-06 ENCOUNTER — NURSE ONLY (OUTPATIENT)
Dept: ALLERGY | Facility: CLINIC | Age: 54
End: 2024-02-06

## 2024-02-06 DIAGNOSIS — E78.2 MIXED HYPERLIPIDEMIA: ICD-10-CM

## 2024-02-06 DIAGNOSIS — J30.89 ENVIRONMENTAL AND SEASONAL ALLERGIES: Primary | ICD-10-CM

## 2024-02-06 LAB
ALT SERPL-CCNC: 45 U/L
AST SERPL-CCNC: 34 U/L (ref ?–34)
CHOLEST SERPL-MCNC: 207 MG/DL (ref ?–200)
FASTING PATIENT LIPID ANSWER: NO
HDLC SERPL-MCNC: 71 MG/DL (ref 40–59)
LDLC SERPL CALC-MCNC: 108 MG/DL (ref ?–100)
NONHDLC SERPL-MCNC: 136 MG/DL (ref ?–130)
TRIGL SERPL-MCNC: 164 MG/DL (ref 30–149)
VLDLC SERPL CALC-MCNC: 28 MG/DL (ref 0–30)

## 2024-02-06 PROCEDURE — 36415 COLL VENOUS BLD VENIPUNCTURE: CPT

## 2024-02-06 PROCEDURE — 95117 IMMUNOTHERAPY INJECTIONS: CPT | Performed by: ALLERGY & IMMUNOLOGY

## 2024-02-06 PROCEDURE — 84450 TRANSFERASE (AST) (SGOT): CPT

## 2024-02-06 PROCEDURE — 80061 LIPID PANEL: CPT

## 2024-02-06 PROCEDURE — 84460 ALANINE AMINO (ALT) (SGPT): CPT

## 2024-02-15 NOTE — TELEPHONE ENCOUNTER
Pt would like order for thyroid faxed to [facility] at # [phone].  Please advise eggs, legumes, and yogurt)  2. No added salt  3. If diabetic, follow a diabetic diet and check glucose prior to meals or as instructed by your physician.    Dietary Supplements(Take twice a day unless instructed otherwise):  [] Ismael  [] 30ml ProStat [] Ensure Complete [] Ensure Max/Premier [] Expedite [] Other:    Your nurse  is:  Terra     Electronically signed by Terra Marcelo RN on 2/15/2024 at 9:26 AM     Wound Care Center Information: Should you experience any significant changes in your wound(s) or have questions about your wound care, please contact the [facility] at [phone].   Hours of operation:  Mon:  8AM - 2PM  Tue: 11AM - 5PM  Wed: CLOSED  Thur: 8AM - 4:30PM  Fri:  8AM - 4:30PM  The office is closed on all major holidays.    Please give us 24-48 business hours to return your call.  These hours of operation are subject to change. If you need help with your wounds and cannot wait until we are available, contact your PCP or go to your preferred emergency room.     Call your doctor now or seek immediate medical care if:    You have symptoms of infection, such as:  Increased pain, swelling, warmth, or redness.  Red streaks leading from the area.  Pus draining from the area.  A fever.         [] Patient unable to sign Discharge Instructions given to ECF/Transportation/POA

## 2024-02-21 ENCOUNTER — TELEPHONE (OUTPATIENT)
Dept: ALLERGY | Facility: CLINIC | Age: 54
End: 2024-02-21

## 2024-02-21 NOTE — TELEPHONE ENCOUNTER
Spoke with patient. Verified name and . Patient states she would like to reschedule her allergy injections to 24 and cancel 3/5/24 and is asking if it would be too late to received allergy shots on 24.    Informed patient may keep 24 allergy shot appointment but to know this is her 35th day to receive injections before doses would be decreased.    Patient states she will keep 24 and will think about her upcoming appointment for 24 and may reschedule.no further questions at this time.

## 2024-02-21 NOTE — TELEPHONE ENCOUNTER
Pt states that she has an appointment for an allergy shot appointment on 3-5-24 and would like to know if she can have it done on 2-27-24 instead. Pt is not sure if this is ok with he allergy shot schedule. Please, call the patient to discuss

## 2024-02-27 ENCOUNTER — NURSE ONLY (OUTPATIENT)
Dept: ALLERGY | Facility: CLINIC | Age: 54
End: 2024-02-27
Payer: COMMERCIAL

## 2024-02-27 ENCOUNTER — PATIENT MESSAGE (OUTPATIENT)
Dept: FAMILY MEDICINE CLINIC | Facility: CLINIC | Age: 54
End: 2024-02-27

## 2024-02-27 ENCOUNTER — OFFICE VISIT (OUTPATIENT)
Dept: FAMILY MEDICINE CLINIC | Facility: CLINIC | Age: 54
End: 2024-02-27

## 2024-02-27 VITALS
DIASTOLIC BLOOD PRESSURE: 100 MMHG | HEIGHT: 64 IN | SYSTOLIC BLOOD PRESSURE: 133 MMHG | WEIGHT: 202 LBS | TEMPERATURE: 97 F | BODY MASS INDEX: 34.49 KG/M2 | HEART RATE: 79 BPM

## 2024-02-27 DIAGNOSIS — F45.20 HYPOCHONDRIACAL DISORDER: ICD-10-CM

## 2024-02-27 DIAGNOSIS — J30.89 OTHER ALLERGIC RHINITIS: ICD-10-CM

## 2024-02-27 DIAGNOSIS — I10 ESSENTIAL HYPERTENSION: ICD-10-CM

## 2024-02-27 DIAGNOSIS — R10.32 LEFT INGUINAL PAIN: ICD-10-CM

## 2024-02-27 DIAGNOSIS — F41.9 ANXIETY: ICD-10-CM

## 2024-02-27 DIAGNOSIS — E03.9 ACQUIRED HYPOTHYROIDISM: ICD-10-CM

## 2024-02-27 DIAGNOSIS — Z00.00 ADULT GENERAL MEDICAL EXAM: Primary | ICD-10-CM

## 2024-02-27 DIAGNOSIS — E66.09 NON MORBID OBESITY DUE TO EXCESS CALORIES: ICD-10-CM

## 2024-02-27 DIAGNOSIS — J30.89 ENVIRONMENTAL AND SEASONAL ALLERGIES: Primary | ICD-10-CM

## 2024-02-27 DIAGNOSIS — E55.9 VITAMIN D DEFICIENCY: ICD-10-CM

## 2024-02-27 DIAGNOSIS — E78.2 MIXED HYPERLIPIDEMIA: ICD-10-CM

## 2024-02-27 PROCEDURE — 95117 IMMUNOTHERAPY INJECTIONS: CPT | Performed by: ALLERGY & IMMUNOLOGY

## 2024-02-27 PROCEDURE — 99396 PREV VISIT EST AGE 40-64: CPT | Performed by: FAMILY MEDICINE

## 2024-02-27 PROCEDURE — 99213 OFFICE O/P EST LOW 20 MIN: CPT | Performed by: FAMILY MEDICINE

## 2024-02-27 PROCEDURE — 3080F DIAST BP >= 90 MM HG: CPT | Performed by: FAMILY MEDICINE

## 2024-02-27 PROCEDURE — 3008F BODY MASS INDEX DOCD: CPT | Performed by: FAMILY MEDICINE

## 2024-02-27 PROCEDURE — 3075F SYST BP GE 130 - 139MM HG: CPT | Performed by: FAMILY MEDICINE

## 2024-02-27 RX ORDER — AMLODIPINE BESYLATE 5 MG/1
5 TABLET ORAL DAILY
Qty: 90 TABLET | Refills: 1 | Status: SHIPPED | OUTPATIENT
Start: 2024-02-27

## 2024-02-27 RX ORDER — ATORVASTATIN CALCIUM 10 MG/1
10 TABLET, FILM COATED ORAL NIGHTLY
Qty: 90 TABLET | Refills: 3 | Status: SHIPPED | OUTPATIENT
Start: 2024-02-27

## 2024-02-27 NOTE — PROGRESS NOTES
Patient ID: Binta Millan is a 53 year old female.    HPI  Chief Complaint   Patient presents with    Routine Physical     Last physical on 1/5/2023.    Pt stopped taking Amlodipine 5 mg 3 months ago; wanted to see if her HTN could be under control without it. Denies lower leg edema with Amlodipine 5 mg.  She was unable to tolerate the 10 mg dose due to the edema.  She also hasn't been taking Atorvastatin daily as it was causing her oily stools and odorous stools; however, she is taking regularly now and feels that she is getting used to it.  She started taking it daily after we let her know about her labs being elevated.. I reviewed her lab results. She states she didn't get good sleep and has consumed excessive amounts of caffeine today as her aunt was hospitalized recently. Pt regularly sees an allergist.     She continues to have intermittent chronic LLQ abdomen that worsens at night time when lying down and has to adjust herself and then the pain resolves completely. Denies pain in the afternoon. Pt visited the Immediate care as she wasn't able to see me for it; completed CT Abdomen + Pelvis in August 2023 and I reviewed the results. Denies any injuries to the area. SHx hysterectomy. I reviewed her CBC, kidney panel, and UA which were all normal. Pt is UTD on colonoscopy as of October 2020 and is cleared until 2030.  She does not have a uterus but she was worried that perhaps this was due to ovarian cancer but I went through the CAT scan with her and let her know that there is no adnexal mass.  She felt much better.  I also let her know that she was tender over the inguinal region and really not the pelvic region.    Pt would like to consult about weight loss medications. She never tried any mediations in the past and would like to try. I reviewed her lipid panel from February 2024. She had a normal TSH as well. Her most recent EKG was in 2023 and was normal.     Health Maintenance   Topic Date Due     COVID-19 Vaccine (3 - 2023-24 season) 09/01/2023    Annual Physical  01/05/2024    Mammogram  03/02/2024    Gyne Pelvic Exam  03/21/2024    HTN: BP Follow-Up  03/27/2024    DTaP,Tdap,and Td Vaccines (2 - Td or Tdap) 09/13/2026    Colorectal Cancer Screening  10/09/2030    Influenza Vaccine  Completed    Annual Depression Screening  Completed    Zoster Vaccines  Completed    Pneumococcal Vaccine: Birth to 64yrs  Aged Out       =======================================================    Lab Results   Component Value Date    WBC 5.3 01/12/2023    RBC 3.94 01/12/2023    HGB 12.6 01/12/2023    HCT 38.8 01/12/2023    .0 01/12/2023    MPV 9.4 02/27/2018    MCV 97.7 08/24/2023    MCH 32.0 01/12/2023    MCHC 32.3 08/24/2023    RDW 13.0 01/12/2023    NEPRELIM 3.24 01/12/2023    NEUT 3.5 12/09/2014    LYMPH 1.3 12/09/2014    MON 0.3 12/09/2014    EOS 0.1 12/09/2014    BASO 0.0 12/09/2014    NEPERCENT 62.0 08/24/2023    LYPERCENT 27.6 08/24/2023    MOPERCENT 9.5 01/12/2023    EOPERCENT 2.1 01/12/2023    BAPERCENT 0.8 01/12/2023    NE 3.24 01/12/2023    LYMABS 1.37 01/12/2023    MOABSO 0.50 01/12/2023    EOABSO 0.11 01/12/2023    BAABSO 0.04 01/12/2023       Lab Results   Component Value Date    GLU 99 01/12/2023    BUN 11 01/12/2023    BUNCREA 14.5 01/12/2023    CREATSERUM 0.76 01/12/2023    ANIONGAP 7 01/12/2023    GFRNAA 103 03/01/2022    GFRAA 118 03/01/2022    CA 9.2 01/12/2023    OSMOCALC 281 01/12/2023    ALKPHO 94 01/12/2023    AST 34 02/06/2024    ALT 45 02/06/2024    ALKPHOS 44 12/09/2014    BILT 0.7 01/12/2023    TP 7.5 01/12/2023    ALB 3.7 01/12/2023    GLOBULIN 3.8 01/12/2023    AGRATIO 1.3 09/28/2020     01/12/2023    K 3.7 01/12/2023     01/12/2023    CO2 29.0 01/12/2023       Lab Results   Component Value Date    GLU 99 01/12/2023    BUN 11 01/12/2023    CREATSERUM 0.76 01/12/2023    BUNCREA 14.5 01/12/2023    ANIONGAP 7 01/12/2023    GFRAA 118 03/01/2022    GFRNAA 103 03/01/2022    CA 9.2  01/12/2023     01/12/2023    K 3.7 01/12/2023     01/12/2023    CO2 29.0 01/12/2023    OSMOCALC 281 01/12/2023       Lab Results   Component Value Date    COLORUR Yellow 03/01/2022    CLARITY Clear 03/01/2022    SPECGRAVITY 1.010 08/24/2023    GLUUR Negative 08/24/2023    BILUR Negative 03/01/2022    KETUR Negative 03/01/2022    BLOODURINE Small (A) 03/01/2022    PHURINE 8.0 03/01/2022    PROUR Negative 03/01/2022    UROBILINOGEN <2.0 03/01/2022    NITRITE Negative 03/01/2022    LEUUR Negative 03/01/2022    WBCUR 1-5 03/01/2022    RBCUR 0-2 03/01/2022    EPIUR Few (A) 03/01/2022    BACUR None Seen 03/01/2022       Lab Results   Component Value Date    CHOLEST 207 (H) 02/06/2024    TRIG 164 (H) 02/06/2024    HDL 71 (H) 02/06/2024     (H) 02/06/2024    VLDL 28 02/06/2024    TCHDLRATIO 2.7 09/28/2020    NONHDLC 136 (H) 02/06/2024    CALCNONHDL 135 (H) 12/09/2014     Free T4 (ng/dL)   Date Value   06/10/2021 1.2     TSH   Date Value   09/07/2023 2.740 mIU/mL   03/25/2020 2.18 mIU/L       Lab Results   Component Value Date    B12 >2,000 (H) 12/10/2020    VITB12 365 12/09/2014       Lab Results   Component Value Date    VITD 36.5 01/12/2023       =======================================================    Wt Readings from Last 6 Encounters:   02/27/24 202 lb   01/18/24 201 lb 12.8 oz   09/28/23 201 lb   08/15/23 200 lb   04/20/23 200 lb   04/03/23 203 lb               BMI Readings from Last 6 Encounters:   02/27/24 34.67 kg/m²   01/18/24 34.64 kg/m²   09/28/23 34.50 kg/m²   08/15/23 34.33 kg/m²   04/20/23 34.33 kg/m²   04/03/23 34.84 kg/m²       BP Readings from Last 6 Encounters:   02/27/24 (!) 133/100   01/18/24 127/88   09/28/23 (!) 152/115   08/24/23 139/79   08/15/23 (!) 152/98   04/20/23 117/82         Review of Systems   Respiratory:  Negative for shortness of breath.    Cardiovascular:  Negative for chest pain.         Past Medical History:   Diagnosis Date    Anxiety state     Essential  hypertension     Helicobacter pylori gastritis 2011    Hiatal hernia     High blood pressure     Hypothyroidism     Insomnia     Sleep apnea     no machine       Past Surgical History:   Procedure Laterality Date    COLONOSCOPY  01/20/2011    COLONOSCOPY N/A 10/9/2020    Procedure: COLONOSCOPY;  Surgeon: Sumeet Echeverria MD;  Location: [facility] ENDOSCOPY    EGD  01/20/2011    HAND/FINGER SURGERY UNLISTED Right 1990    little finger osteotomy    HEMORRHOIDECTOMY,EXTERNAL  10/22/2020    w/ skin tag ex    HYSTERECTOMY  04/18/2022    TLH/BS -- essure intact       Social History     Socioeconomic History    Marital status: Single     Spouse name: Not on file    Number of children: 2    Years of education: Not on file    Highest education level: Not on file   Occupational History    Occupation: realtor   Tobacco Use    Smoking status: Never     Passive exposure: Never    Smokeless tobacco: Never   Vaping Use    Vaping Use: Not on file   Substance and Sexual Activity    Alcohol use: Yes     Alcohol/week: 2.0 standard drinks of alcohol     Types: 2 Glasses of wine per week    Drug use: No    Sexual activity: Not Currently     Partners: Male     Birth control/protection: Hysterectomy   Other Topics Concern     Service Not Asked    Blood Transfusions Not Asked    Caffeine Concern Yes     Comment: coffee 2 cups    Occupational Exposure Not Asked    Hobby Hazards Not Asked    Sleep Concern Not Asked    Stress Concern Not Asked    Weight Concern Not Asked    Special Diet Not Asked    Back Care Not Asked    Exercise Not Asked    Bike Helmet Not Asked    Seat Belt Not Asked    Self-Exams Not Asked    Grew up on a farm Not Asked    History of tanning Not Asked    Outdoor occupation Not Asked    Pt has a pacemaker No    Pt has a defibrillator No    Breast feeding Not Asked    Reaction to local anesthetic No   Social History Narrative    Not on file     Social Determinants of Health     Financial Resource Strain: Not on file   Food  Insecurity: Not on file   Transportation Needs: Not on file   Physical Activity: Not on file   Stress: Not on file   Social Connections: Not on file   Housing Stability: Not on file          Current Outpatient Medications   Medication Sig Dispense Refill    diazePAM 5 MG Oral Tab Take 1 tablet (5 mg total) by mouth nightly as needed (TMJ dysfunction.). 15 tablet 0    cyclobenzaprine 10 MG Oral Tab Take 1 tablet (10 mg total) by mouth nightly as needed for Muscle spasms. *can make tired* 30 tablet 0    atorvastatin 10 MG Oral Tab Take 1 tablet (10 mg total) by mouth nightly. Pt needs appointment for repeat labs 90 tablet 0    desonide 0.05 % External Cream Apply sparingly and rub gently into the affected areas of eczema around the eyes 15 g 0    triamcinolone 0.1 % External Cream Apply 1 Application topically 2 (two) times daily. To dry itchy skin 80 g 2    Tretinoin 0.05 % External Cream Apply a small to face as directed at bedtime. 20 g 3    hydroCHLOROthiazide 25 MG Oral Tab Take 1 tablet (25 mg total) by mouth daily. 90 tablet 2    diclofenac 1 % External Gel Apply 2 g topically 2 (two) times daily as needed. To affected area 100 g 1    LEVOTHYROXINE 137 MCG Oral Tab TAKE 1 TABLET BY MOUTH DAILY 90 tablet 3    celecoxib (CELEBREX) 200 MG Oral Cap Take 1 capsule (200 mg total) by mouth daily with dinner. Take with food. This is for pain and inflammation. 30 capsule 0    amLODIPine 5 MG Oral Tab Take 1 tablet (5 mg total) by mouth daily. 90 tablet 1    albuterol (PROAIR HFA) 108 (90 Base) MCG/ACT Inhalation Aero Soln Inhale 2 puffs into the lungs every 4 (four) hours as needed for Wheezing. 1 each 0     Allergies:  Allergies   Allergen Reactions    Dust Mites SHORTNESS OF BREATH     Itchiness and SOB    Cats Claw, Uncaria Tomentosa ITCHING    Latex ITCHING    Mold ITCHING      PHYSICAL EXAM:   Physical Exam      Physical Exam   Constitutional: . She appears well-developed and well-nourished. No distress.   Head:  Normocephalic.   Right Ear: Tympanic membrane and ear canal normal.   Left Ear: Tympanic membrane and ear canal normal.   Nose: No mucosal edema or rhinorrhea.   Mouth/Throat: Oropharynx is clear and moist and mucous membranes are normal.   Eyes: Conjunctivae and EOM are normal. Pupils are equal, round, and reactive to light.   Neck: Normal range of motion. Neck supple. No thyromegaly present.   Cardiovascular: Normal rate, regular rhythm and no murmur heard.   Pulmonary/Chest: Effort normal and breath sounds normal. No respiratory distress.   Abdominal: Soft. Bowel sounds are normal. There is no hepatosplenomegaly. She is tender over the left inguinal region. No lymphadenopathy. No inguinal hernia. Negative straight leg raise in the left. Full ROM with IR and ER left hip that is painless.  Lymphadenopathy: She has no cervical adenopathy.   Neurological: She is alert and oriented to person, place, and time. She has normal reflexes. No cranial nerve deficit.   Skin: Skin is warm and dry. No rash noted.   Psychiatric: Anxious affect but very pleasant and able to be reassured.  Lower legs: No edema of the legs bilaterally.    Vitals reviewed.    Blood pressure (!) 160/100, pulse 79, temperature 97.3 °F (36.3 °C), temperature source Temporal, height 5' 4\" (1.626 m), weight 202 lb, last menstrual period 04/10/2022, not currently breastfeeding.    Vitals:    02/27/24 1429 02/27/24 1439   BP: (!) 160/100 (!) 133/100   Pulse: 79    Temp: 97.3 °F (36.3 °C)    TempSrc: Temporal    Weight: 202 lb    Height: 5' 4\" (1.626 m)             ASSESSMENT/PLAN:     Diagnoses and all orders for this visit:    Adult general medical exam  -     CBC With Differential With Platelet; Future  -     Basic Metabolic Panel (8); Future  I ordered labs for her.  Mixed hyperlipidemia  -     atorvastatin 10 MG Oral Tab; Take 1 tablet (10 mg total) by mouth nightly. Pt needs appointment for repeat labs  Mostly on the atorvastatin daily to decrease  chances of heart disease and strokes  Left inguinal pain  -     XR PELVIS (1 VIEW) (CPT=72170); Future  Let's go ahead do an x-ray of the pelvis just to make sure that this is okay although I do not see any bad arthritis on exam of her left hip.  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  Get back on the amlodipine  Anxiety  Stable  Acquired hypothyroidism  TSH was normal and she will continue the same dose of thyroid medication  Non morbid obesity due to excess calories  -     Leptin, Serum; Future  We will discuss weight after I get the labs back along with the EKG.      Referrals (if applicable)  No orders of the defined types were placed in this encounter.      Follow up if symptoms persist.  Take medicine (if given) as prescribed.  Approach to treatment discussed and patient/family member understands and agrees to plan.     No follow-ups on file.    There are no Patient Instructions on file for this visit.    Silva Gutierrez    2/27/2024    By signing my name below, ISilva,  attest that this documentation has been prepared under the direction and in the presence of Joseph Verdin DO.   Electronically Signed: Silva Gutierrez, 2/27/2024, 2:33 PM.      I, Joseph Verdin DO,  personally performed the services described in this documentation. All medical record entries made by the scribe were at my direction and in my presence.  I have reviewed the chart and discharge instructions (if applicable) and agree that the record reflects my personal performance and is accurate and complete.  Joseph Verdin DO, 2/27/2024, 3:00 PM

## 2024-03-05 NOTE — TELEPHONE ENCOUNTER
Action Requested: Summary for Provider     []  Critical Lab, Recommendations Needed  [] Need Additional Advice  []   FYI    []   Need Orders  [] Need Medications Sent to Pharmacy  []  Other     SUMMARY: Per protocol: OV within 2 weeks. Patient refused.  She
no

## 2024-03-26 ENCOUNTER — NURSE ONLY (OUTPATIENT)
Dept: ALLERGY | Facility: CLINIC | Age: 54
End: 2024-03-26

## 2024-03-26 DIAGNOSIS — J30.89 ENVIRONMENTAL AND SEASONAL ALLERGIES: Primary | ICD-10-CM

## 2024-03-26 PROCEDURE — 95117 IMMUNOTHERAPY INJECTIONS: CPT | Performed by: ALLERGY & IMMUNOLOGY

## 2024-04-02 ENCOUNTER — OFFICE VISIT (OUTPATIENT)
Dept: OBGYN CLINIC | Facility: CLINIC | Age: 54
End: 2024-04-02

## 2024-04-02 VITALS
DIASTOLIC BLOOD PRESSURE: 104 MMHG | WEIGHT: 200 LBS | HEIGHT: 64 IN | SYSTOLIC BLOOD PRESSURE: 147 MMHG | BODY MASS INDEX: 34.15 KG/M2 | HEART RATE: 93 BPM

## 2024-04-02 DIAGNOSIS — Z01.419 ENCOUNTER FOR GYNECOLOGICAL EXAMINATION WITHOUT ABNORMAL FINDING: Primary | ICD-10-CM

## 2024-04-02 DIAGNOSIS — Z12.31 VISIT FOR SCREENING MAMMOGRAM: ICD-10-CM

## 2024-04-02 PROCEDURE — 3077F SYST BP >= 140 MM HG: CPT | Performed by: OBSTETRICS & GYNECOLOGY

## 2024-04-02 PROCEDURE — 3008F BODY MASS INDEX DOCD: CPT | Performed by: OBSTETRICS & GYNECOLOGY

## 2024-04-02 PROCEDURE — 3080F DIAST BP >= 90 MM HG: CPT | Performed by: OBSTETRICS & GYNECOLOGY

## 2024-04-02 PROCEDURE — 99396 PREV VISIT EST AGE 40-64: CPT | Performed by: OBSTETRICS & GYNECOLOGY

## 2024-04-02 NOTE — PROGRESS NOTES
Binta Millan is a 53 year old female  Patient's last menstrual period was 04/10/2022.   Chief Complaint   Patient presents with    Gyn Exam     ANNUAL EXAM -- doing well   .getting LLQ superificial pain evaluated by PCP    OBSTETRICS HISTORY:     OB History    Para Term  AB Living   3 2 2   1 2   SAB IAB Ectopic Multiple Live Births   1       2      # Outcome Date GA Lbr Eldon/2nd Weight Sex Delivery Anes PTL Lv   3 Term     M NORMAL SPONT   EL   2 Term     M NORMAL SPONT   EL   1 SAB                GYNE HISTORY:     Periods none due to hysterectomy      BCM:  Hysterectomy    History   Sexual Activity    Sexual activity: Not Currently    Partners: Male    Birth control/ protection: Hysterectomy        Hx Prior Abnormal Pap: No  Pap Date: 22  Pap Result Notes: NEG PAP / NEG HPV  Follow Up Recommendation: MAMMO BILATERAL 3/2/23 LEFT BENIGN          Latest Ref Rng & Units 2022     1:25 PM 10/3/2016     2:32 PM   RECENT PAP RESULTS   Thinprep Pap Negative for intraepithelial lesion or malignancy Negative for intraepithelial lesion or malignancy  Negative for intraepithelial lesion or malignancy    HPV Negative Negative  Negative          MEDICAL HISTORY:     Past Medical History:   Diagnosis Date    Anxiety state     Essential hypertension     Helicobacter pylori gastritis     Hiatal hernia     High blood pressure     Hypothyroidism     Insomnia     Sleep apnea     no machine     Past Surgical History:   Procedure Laterality Date    COLONOSCOPY  2011    COLONOSCOPY N/A 10/9/2020    Procedure: COLONOSCOPY;  Surgeon: Sumeet Echeverria MD;  Location: [facility] ENDOSCOPY    EGD  2011    HAND/FINGER SURGERY UNLISTED Right     little finger osteotomy    HEMORRHOIDECTOMY,EXTERNAL  10/22/2020    w/ skin tag ex    HYSTERECTOMY  2022    TLH/BS -- essure intact     OB History    Para Term  AB Living   3 2 2 0 1 2   SAB IAB Ectopic Multiple Live Births    1 0 0 0 2        SOCIAL HISTORY:     Tobacco Use: Low Risk  (4/2/2024)    Patient History     Smoking Tobacco Use: Never     Smokeless Tobacco Use: Never     Passive Exposure: Never       FAMILY HISTORY:     Family History   Problem Relation Age of Onset    Diabetes Father         type 1    Heart Disorder Father     Hypertension Father     Other (gout) Father     Other (arthritis) Father     Cancer Mother     Diabetes Mother     Hypertension Mother     Other (arthritis) Mother     Other (psoriasis) Son     Diabetes Brother          MEDICATIONS:       Current Outpatient Medications:     atorvastatin 10 MG Oral Tab, Take 1 tablet (10 mg total) by mouth nightly. Pt needs appointment for repeat labs, Disp: 90 tablet, Rfl: 3    amLODIPine 5 MG Oral Tab, Take 1 tablet (5 mg total) by mouth daily., Disp: 90 tablet, Rfl: 1    diazePAM 5 MG Oral Tab, Take 1 tablet (5 mg total) by mouth nightly as needed (TMJ dysfunction.)., Disp: 15 tablet, Rfl: 0    cyclobenzaprine 10 MG Oral Tab, Take 1 tablet (10 mg total) by mouth nightly as needed for Muscle spasms. *can make tired*, Disp: 30 tablet, Rfl: 0    desonide 0.05 % External Cream, Apply sparingly and rub gently into the affected areas of eczema around the eyes, Disp: 15 g, Rfl: 0    triamcinolone 0.1 % External Cream, Apply 1 Application topically 2 (two) times daily. To dry itchy skin, Disp: 80 g, Rfl: 2    Tretinoin 0.05 % External Cream, Apply a small to face as directed at bedtime., Disp: 20 g, Rfl: 3    hydroCHLOROthiazide 25 MG Oral Tab, Take 1 tablet (25 mg total) by mouth daily., Disp: 90 tablet, Rfl: 2    diclofenac 1 % External Gel, Apply 2 g topically 2 (two) times daily as needed. To affected area, Disp: 100 g, Rfl: 1    LEVOTHYROXINE 137 MCG Oral Tab, TAKE 1 TABLET BY MOUTH DAILY, Disp: 90 tablet, Rfl: 3    celecoxib (CELEBREX) 200 MG Oral Cap, Take 1 capsule (200 mg total) by mouth daily with dinner. Take with food. This is for pain and inflammation., Disp: 30  capsule, Rfl: 0    albuterol (PROAIR HFA) 108 (90 Base) MCG/ACT Inhalation Aero Soln, Inhale 2 puffs into the lungs every 4 (four) hours as needed for Wheezing., Disp: 1 each, Rfl: 0    ALLERGIES:       Allergies   Allergen Reactions    Dust Mites SHORTNESS OF BREATH     Itchiness and SOB    Cats Claw, Uncaria Tomentosa ITCHING    Latex ITCHING    Mold ITCHING         REVIEW OF SYSTEMS:     Constitutional:    denies fever / chills  Eyes:     denies blurred or double vision  Cardiovascular:  denies chest pain or palpitations  Respiratory:    denies shortness of breath  Gastrointestinal:  denies severe abdominal pain, frequent diarrhea or constipation, nausea / vomiting  Genitourinary:    denies dysuria, bothersome incontinence  Skin/Breast:   denies any breast pain, lumps, or discharge  Neurological:    denies frequent severe headaches  Psychiatric:   denies depression or anxiety, thoughts of harming self or others  Heme/Lymph:    denies easy bruising or bleeding      PHYSICAL EXAM:   Blood pressure (!) 147/104, pulse 93, height 5' 4\" (1.626 m), weight 200 lb (90.7 kg), last menstrual period 04/10/2022, not currently breastfeeding.  Constitutional:  well developed, well nourished  Head/Face:  normocephalic  Neck/Thyroid: thyroid symmetric, no thyromegaly, no nodules, no adenopathy  Lymphatic: no abnormal supraclavicular or axillary adenopathy is noted  Breast:   normal without palpable masses, tenderness, asymmetry, nipple discharge, nipple retraction or skin changes  Abdomen:   soft, nontender, nondistended, no masses  Skin/Hair:  no unusual rashes or bruises  Extremities:  no edema, no cyanosis  Psychiatric:   oriented to time, place, person and situation. Appropriate mood and affect    Pelvic Exam:  External Genitalia:  normal appearance, hair distribution, and no lesions  Urethral Meatus:   normal in size, location, without lesions and prolapse  Bladder:    no fullness, masses or tenderness  Vagina:    normal  appearance without lesions, no abnormal discharge  Cervix:    absent  Uterus:    abseny  Adnexa:   normal without masses or tenderness  Perineum:   normal  Anus: no hemorroids         ASSESSMENT & PLAN:     Binta was seen today for gyn exam.    Diagnoses and all orders for this visit:    Encounter for gynecological examination without abnormal finding    Visit for screening mammogram  -     Mammogram Screen 3D Digital Bilateral; Future        SUMMARY:  Pap: No more paps per ASCCP guidelines.  BCM:  Hysterectomy  STD screening: declines  Mammogram: ordered placed   updated  Depression screen:   Depression Screening (PHQ-2/PHQ-9): Over the LAST 2 WEEKS   Little interest or pleasure in doing things (over the last two weeks)?: Not at all    Feeling down, depressed, or hopeless (over the last two weeks)?: Not at all    PHQ-2 SCORE: 0          FOLLOW-UP     Return in about 1 year (around 4/2/2025) for annual gyne exam.    Note to patient and family:  The 21st Century Cures Act makes medical notes available to patients in the interest of transparency.  However, please be advised that this is a medical document.  It is intended as a peer to peer communication.  It is written in medical language and may contain abbreviations or verbiage that are technical and unfamiliar.  It may appear blunt or direct.  Medical documents are intended to carry relevant information, facts as evident, and the clinical opinion of the practitioner.

## 2024-04-11 ENCOUNTER — HOSPITAL ENCOUNTER (OUTPATIENT)
Dept: GENERAL RADIOLOGY | Age: 54
Discharge: HOME | End: 2024-04-11
Attending: FAMILY MEDICINE
Payer: COMMERCIAL

## 2024-04-11 DIAGNOSIS — R10.32 LEFT INGUINAL PAIN: ICD-10-CM

## 2024-04-23 ENCOUNTER — NURSE ONLY (OUTPATIENT)
Dept: ALLERGY | Facility: CLINIC | Age: 54
End: 2024-04-23

## 2024-04-23 DIAGNOSIS — J30.89 ENVIRONMENTAL AND SEASONAL ALLERGIES: Primary | ICD-10-CM

## 2024-04-23 PROCEDURE — 95117 IMMUNOTHERAPY INJECTIONS: CPT | Performed by: ALLERGY & IMMUNOLOGY

## 2024-04-25 ENCOUNTER — EKG ENCOUNTER (OUTPATIENT)
Dept: LAB | Age: 54
End: 2024-04-25
Attending: FAMILY MEDICINE
Payer: COMMERCIAL

## 2024-04-25 ENCOUNTER — LAB ENCOUNTER (OUTPATIENT)
Dept: LAB | Age: 54
End: 2024-04-25
Attending: FAMILY MEDICINE
Payer: COMMERCIAL

## 2024-04-25 DIAGNOSIS — Z00.00 ADULT GENERAL MEDICAL EXAM: ICD-10-CM

## 2024-04-25 DIAGNOSIS — E66.09 NON MORBID OBESITY DUE TO EXCESS CALORIES: ICD-10-CM

## 2024-04-25 DIAGNOSIS — I10 ESSENTIAL HYPERTENSION: ICD-10-CM

## 2024-04-25 DIAGNOSIS — E55.9 VITAMIN D DEFICIENCY: ICD-10-CM

## 2024-04-25 LAB
ANION GAP SERPL CALC-SCNC: 5 MMOL/L (ref 0–18)
ATRIAL RATE: 70 BPM
BASOPHILS # BLD AUTO: 0.02 X10(3) UL (ref 0–0.2)
BASOPHILS NFR BLD AUTO: 0.5 %
BUN BLD-MCNC: 12 MG/DL (ref 9–23)
BUN/CREAT SERPL: 16.2 (ref 10–20)
CALCIUM BLD-MCNC: 9.5 MG/DL (ref 8.7–10.4)
CHLORIDE SERPL-SCNC: 108 MMOL/L (ref 98–112)
CO2 SERPL-SCNC: 28 MMOL/L (ref 21–32)
CREAT BLD-MCNC: 0.74 MG/DL
DEPRECATED RDW RBC AUTO: 45 FL (ref 35.1–46.3)
EGFRCR SERPLBLD CKD-EPI 2021: 97 ML/MIN/1.73M2 (ref 60–?)
EOSINOPHIL # BLD AUTO: 0.17 X10(3) UL (ref 0–0.7)
EOSINOPHIL NFR BLD AUTO: 4.3 %
ERYTHROCYTE [DISTWIDTH] IN BLOOD BY AUTOMATED COUNT: 12.5 % (ref 11–15)
FASTING STATUS PATIENT QL REPORTED: YES
GLUCOSE BLD-MCNC: 100 MG/DL (ref 70–99)
HCT VFR BLD AUTO: 38.2 %
HGB BLD-MCNC: 12.5 G/DL
IMM GRANULOCYTES # BLD AUTO: 0.01 X10(3) UL (ref 0–1)
IMM GRANULOCYTES NFR BLD: 0.3 %
LYMPHOCYTES # BLD AUTO: 1.22 X10(3) UL (ref 1–4)
LYMPHOCYTES NFR BLD AUTO: 31.1 %
MCH RBC QN AUTO: 31.8 PG (ref 26–34)
MCHC RBC AUTO-ENTMCNC: 32.7 G/DL (ref 31–37)
MCV RBC AUTO: 97.2 FL
MONOCYTES # BLD AUTO: 0.35 X10(3) UL (ref 0.1–1)
MONOCYTES NFR BLD AUTO: 8.9 %
NEUTROPHILS # BLD AUTO: 2.15 X10 (3) UL (ref 1.5–7.7)
NEUTROPHILS # BLD AUTO: 2.15 X10(3) UL (ref 1.5–7.7)
NEUTROPHILS NFR BLD AUTO: 54.9 %
OSMOLALITY SERPL CALC.SUM OF ELEC: 292 MOSM/KG (ref 275–295)
P AXIS: 17 DEGREES
P-R INTERVAL: 158 MS
PLATELET # BLD AUTO: 246 10(3)UL (ref 150–450)
POTASSIUM SERPL-SCNC: 3.8 MMOL/L (ref 3.5–5.1)
Q-T INTERVAL: 416 MS
QRS DURATION: 90 MS
QTC CALCULATION (BEZET): 449 MS
R AXIS: 13 DEGREES
RBC # BLD AUTO: 3.93 X10(6)UL
SODIUM SERPL-SCNC: 141 MMOL/L (ref 136–145)
T AXIS: 2 DEGREES
VENTRICULAR RATE: 70 BPM
VIT D+METAB SERPL-MCNC: 34.7 NG/ML (ref 30–100)
WBC # BLD AUTO: 3.9 X10(3) UL (ref 4–11)

## 2024-04-25 PROCEDURE — 82306 VITAMIN D 25 HYDROXY: CPT

## 2024-04-25 PROCEDURE — 80048 BASIC METABOLIC PNL TOTAL CA: CPT

## 2024-04-25 PROCEDURE — 36415 COLL VENOUS BLD VENIPUNCTURE: CPT

## 2024-04-25 PROCEDURE — 93005 ELECTROCARDIOGRAM TRACING: CPT

## 2024-04-25 PROCEDURE — 85025 COMPLETE CBC W/AUTO DIFF WBC: CPT

## 2024-04-25 PROCEDURE — 93010 ELECTROCARDIOGRAM REPORT: CPT | Performed by: INTERNAL MEDICINE

## 2024-04-25 PROCEDURE — 83520 IMMUNOASSAY QUANT NOS NONAB: CPT

## 2024-04-29 LAB — LEPTIN: 30.4 NG/ML

## 2024-05-21 ENCOUNTER — NURSE ONLY (OUTPATIENT)
Dept: ALLERGY | Facility: CLINIC | Age: 54
End: 2024-05-21

## 2024-05-21 DIAGNOSIS — J30.89 ENVIRONMENTAL AND SEASONAL ALLERGIES: Primary | ICD-10-CM

## 2024-05-21 PROCEDURE — 95117 IMMUNOTHERAPY INJECTIONS: CPT | Performed by: ALLERGY & IMMUNOLOGY

## 2024-05-21 PROCEDURE — 95165 ANTIGEN THERAPY SERVICES: CPT | Performed by: ALLERGY & IMMUNOLOGY
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
## 2024-05-28 DIAGNOSIS — M54.2 BILATERAL POSTERIOR NECK PAIN: ICD-10-CM

## 2024-05-28 DIAGNOSIS — S46.819A STRAIN OF TRAPEZIUS MUSCLE, UNSPECIFIED LATERALITY, INITIAL ENCOUNTER: ICD-10-CM

## 2024-05-28 DIAGNOSIS — M26.609 TMJ DYSFUNCTION: ICD-10-CM

## 2024-05-28 DIAGNOSIS — R68.84 JAW PAIN: ICD-10-CM

## 2024-05-31 RX ORDER — DIAZEPAM 5 MG/1
5 TABLET ORAL NIGHTLY PRN
Qty: 15 TABLET | Refills: 0 | Status: SHIPPED | OUTPATIENT
Start: 2024-05-31

## 2024-05-31 RX ORDER — CYCLOBENZAPRINE HCL 10 MG
TABLET ORAL
Qty: 30 TABLET | Refills: 0 | Status: SHIPPED | OUTPATIENT
Start: 2024-05-31

## 2024-05-31 NOTE — TELEPHONE ENCOUNTER
Please review. Protocol Failed; No Protocol      Recent fills: 1/25/2024  Last Rx written: 1/25/2024  Last office visit: 2/27/2024      Future Appointments  Date Type Provider Dept   06/13/24 Appointment Joseph Verdin DO [facility] Med   Showing future appointments within next 150 days with a meds authorizing provider and meeting all other requirements      Requested Prescriptions   Pending Prescriptions Disp Refills    CYCLOBENZAPRINE 10 MG Oral Tab [Pharmacy Med Name: CYCLOBENZAPRINE 10MG TABLETS] 30 tablet 0     Sig: TAKE 1 TABLET BY MOUTH NIGHTLY AS NEEDED FOR MUSCLE SPASMS. CAN MAKE TIRED       There is no refill protocol information for this order       DIAZEPAM 5 MG Oral Tab [Pharmacy Med Name: DIAZEPAM 5MG TABLETS] 15 tablet 0     Sig: TAKE 1 TABLET BY MOUTH NIGHTLY AS NEEDED       Controlled Substance Medication Failed - 5/28/2024  9:40 PM        Failed - This medication is a controlled substance - forward to provider to refill               Future Appointments         Provider Department Appt Notes    In 1 week [facility] DEXA RM1; [facility] MARCO ANTONIO RM1 [facility] [place]     In 1 week Joseph Verdin DO [facility] [place] lump on right wrist and test results    In 2 weeks EC [facility]ER[facility]     In 1 month EC ALLERGY [facility]     In 2 months Jose Lemons MD [facility] Annual and AIT          Recent Outpatient Visits              1 week ago Environmental and seasonal allergies    [facility]    Nurse Only    1 month ago Environmental and seasonal allergies [J30.89]    [facility]    Nurse Only    1 month ago Encounter for gynecological examination without abnormal finding    [facility] -  OB/GYN Karen Yun MD    Office Visit    2 months ago Environmental and seasonal allergies    [facility]    Nurse Only    3 months ago Environmental and seasonal allergies    [facility]    Nurse Only

## 2024-06-13 ENCOUNTER — OFFICE VISIT (OUTPATIENT)
Dept: FAMILY MEDICINE CLINIC | Facility: CLINIC | Age: 54
End: 2024-06-13

## 2024-06-13 ENCOUNTER — HOSPITAL ENCOUNTER (OUTPATIENT)
Dept: MAMMOGRAPHY | Age: 54
Discharge: HOME | End: 2024-06-13
Attending: OBSTETRICS & GYNECOLOGY
Payer: COMMERCIAL

## 2024-06-13 ENCOUNTER — HOSPITAL ENCOUNTER (OUTPATIENT)
Dept: GENERAL RADIOLOGY | Age: 54
Discharge: HOME | End: 2024-06-13
Attending: FAMILY MEDICINE
Payer: COMMERCIAL

## 2024-06-13 VITALS
BODY MASS INDEX: 35.34 KG/M2 | TEMPERATURE: 97 F | DIASTOLIC BLOOD PRESSURE: 81 MMHG | SYSTOLIC BLOOD PRESSURE: 129 MMHG | HEIGHT: 64 IN | HEART RATE: 90 BPM | WEIGHT: 207 LBS

## 2024-06-13 DIAGNOSIS — I10 ESSENTIAL HYPERTENSION: ICD-10-CM

## 2024-06-13 DIAGNOSIS — M67.431 GANGLION CYST OF VOLAR ASPECT OF RIGHT WRIST: ICD-10-CM

## 2024-06-13 DIAGNOSIS — E66.01 SEVERE OBESITY (BMI 35.0-35.9 WITH COMORBIDITY) (HCC): ICD-10-CM

## 2024-06-13 DIAGNOSIS — R60.0 BILATERAL LEG EDEMA: Primary | ICD-10-CM

## 2024-06-13 DIAGNOSIS — Z12.31 VISIT FOR SCREENING MAMMOGRAM: ICD-10-CM

## 2024-06-13 PROCEDURE — 99215 OFFICE O/P EST HI 40 MIN: CPT | Performed by: FAMILY MEDICINE

## 2024-06-13 PROCEDURE — 72170 X-RAY EXAM OF PELVIS: CPT | Performed by: FAMILY MEDICINE

## 2024-06-13 PROCEDURE — 77063 BREAST TOMOSYNTHESIS BI: CPT | Performed by: OBSTETRICS & GYNECOLOGY

## 2024-06-13 PROCEDURE — 3079F DIAST BP 80-89 MM HG: CPT | Performed by: FAMILY MEDICINE

## 2024-06-13 PROCEDURE — 3074F SYST BP LT 130 MM HG: CPT | Performed by: FAMILY MEDICINE

## 2024-06-13 PROCEDURE — 3008F BODY MASS INDEX DOCD: CPT | Performed by: FAMILY MEDICINE

## 2024-06-13 PROCEDURE — 77067 SCR MAMMO BI INCL CAD: CPT | Performed by: OBSTETRICS & GYNECOLOGY

## 2024-06-13 RX ORDER — PHENTERMINE HYDROCHLORIDE 15 MG/1
15 CAPSULE ORAL EVERY MORNING
Qty: 90 CAPSULE | Refills: 0 | Status: SHIPPED | OUTPATIENT
Start: 2024-06-13

## 2024-06-13 RX ORDER — OLMESARTAN MEDOXOMIL 20 MG/1
20 TABLET ORAL DAILY
Qty: 90 TABLET | Refills: 1 | Status: SHIPPED | OUTPATIENT
Start: 2024-06-13

## 2024-06-13 NOTE — PATIENT INSTRUCTIONS
Stop the amlodipine as it is causing swelling of your legs.  Will get a place you on Benicar 20 mg instead for blood pressure.  Continue the hydrochlorothiazide.  For weight loss we can start you on phentermine 15 mg in the morning.

## 2024-06-13 NOTE — PROGRESS NOTES
Patient ID: Binta Millan is a 53 year old female.    HPI  Chief Complaint   Patient presents with    Lump     Right wrist - x 2 weeks - denies pain -     Test Results     Weight loss options    Hypertension     F/u     Last seen by me on 2/27/2024.    Pt c/o a lump in her right wrist that she noticed 2 weeks ago. Denies pain. She rests her right wrist on the desk when she uses a mouse at work. I discussed this with her.  She has full range of motion of her wrist.  She states that the lump does not hurt.    I reviewed her lab results from April 2024. She is compliant with her medications. Pt has been compliant with Amlodipine 5 mg but developed intermittent lower leg edema. She was switched off from Zestoretic to just plain hydrochlorothiazide which she tolerates as the Zestoretic was causing her diarrhea.       Pt wants to discuss medical weight loss options. Pt gained 7 lbs since April 2024. She completed a Leptin level and was at 30.4. I discussed oral medications with her and she wants to try taking the medication. Denies CP or SOB. I reviewed her most recent EKG which was normal.     Wt Readings from Last 6 Encounters:   06/13/24 207 lb   04/02/24 200 lb   02/27/24 202 lb   01/18/24 201 lb 12.8 oz   09/28/23 201 lb   08/15/23 200 lb       BMI Readings from Last 6 Encounters:   06/13/24 35.53 kg/m²   04/02/24 34.33 kg/m²   02/27/24 34.67 kg/m²   01/18/24 34.64 kg/m²   09/28/23 34.50 kg/m²   08/15/23 34.33 kg/m²       BP Readings from Last 6 Encounters:   06/13/24 129/81   04/02/24 (!) 147/104   02/27/24 (!) 133/100   01/18/24 127/88   09/28/23 (!) 152/115   08/24/23 139/79         Review of Systems   Respiratory:  Negative for shortness of breath.    Cardiovascular:  Positive for leg swelling. Negative for chest pain.           Medical History:      Past Medical History:    Anxiety state    Essential hypertension    Helicobacter pylori gastritis    Hiatal hernia    High blood pressure    Hypothyroidism     Insomnia    Sleep apnea    no machine       Past Surgical History:   Procedure Laterality Date    Colonoscopy  01/20/2011    Colonoscopy N/A 10/9/2020    Procedure: COLONOSCOPY;  Surgeon: Sumeet Echeverria MD;  Location: [facility] ENDOSCOPY    Egd  01/20/2011    Hand/finger surgery unlisted Right 1990    little finger osteotomy    Hemorrhoidectomy,external  10/22/2020    w/ skin tag ex    Hysterectomy  04/18/2022    TLH/BS -- essure intact          Current Outpatient Medications   Medication Sig Dispense Refill    cyclobenzaprine 10 MG Oral Tab TAKE 1 TABLET BY MOUTH NIGHTLY AS NEEDED FOR MUSCLE SPASMS. CAN MAKE TIRED 30 tablet 0    diazePAM 5 MG Oral Tab Take 1 tablet (5 mg total) by mouth nightly as needed. 15 tablet 0    atorvastatin 10 MG Oral Tab Take 1 tablet (10 mg total) by mouth nightly. Pt needs appointment for repeat labs 90 tablet 3    amLODIPine 5 MG Oral Tab Take 1 tablet (5 mg total) by mouth daily. 90 tablet 1    desonide 0.05 % External Cream Apply sparingly and rub gently into the affected areas of eczema around the eyes 15 g 0    triamcinolone 0.1 % External Cream Apply 1 Application topically 2 (two) times daily. To dry itchy skin 80 g 2    hydroCHLOROthiazide 25 MG Oral Tab Take 1 tablet (25 mg total) by mouth daily. 90 tablet 2    diclofenac 1 % External Gel Apply 2 g topically 2 (two) times daily as needed. To affected area 100 g 1    LEVOTHYROXINE 137 MCG Oral Tab TAKE 1 TABLET BY MOUTH DAILY 90 tablet 3    celecoxib (CELEBREX) 200 MG Oral Cap Take 1 capsule (200 mg total) by mouth daily with dinner. Take with food. This is for pain and inflammation. 30 capsule 0    Tretinoin 0.05 % External Cream Apply a small to face as directed at bedtime. (Patient not taking: Reported on 6/13/2024) 20 g 3    albuterol (PROAIR HFA) 108 (90 Base) MCG/ACT Inhalation Aero Soln Inhale 2 puffs into the lungs every 4 (four) hours as needed for Wheezing. (Patient not taking: Reported on 6/13/2024) 1 each 0      Allergies:  Allergies   Allergen Reactions    Dust Mites SHORTNESS OF BREATH     Itchiness and SOB    Cats Claw, Uncaria Tomentosa ITCHING    Latex ITCHING    Mold ITCHING        Physical Exam:       Physical Exam  Blood pressure 129/81, pulse 90, temperature 96.7 °F (35.9 °C), height 5' 4\" (1.626 m), weight 207 lb, last menstrual period 04/10/2022, not currently breastfeeding.      Physical Exam   Constitutional: Patient is oriented to person, place, and time. Patient appears well-developed and well-nourished. No distress.   Head: Normocephalic.   Eyes: Conjunctivae and EOM are normal.   Neck: Normal range of motion. No thyromegaly present.   Cardiovascular: Normal rate, regular rhythm and normal heart sounds.    Pulmonary/Chest: Effort normal and breath sounds normal. No respiratory distress.   Lymphadenopathy: Patient has no cervical adenopathy.  Neurological: Patient is alert and oriented to person, place, and time.   Skin: Skin is warm.   Psychiatry: Normal mood and affect.  Lower legs: Trace pretibial edema of the legs bilaterally.  She has no calf tenderness.  Good range of motion of the ankle.  Right wrist: Pt has a 7 mm ganglion cyst of the right volar wrist at the ulnar aspect.  This is not tender.  It is mobile.  She has full range of motion of her fingers and wrist.    Vitals reviewed.           Assessment/Plan:      Diagnoses and all orders for this visit:    Bilateral leg edema  See patient instructions  Essential hypertension  -     olmesartan 20 MG Oral Tab; Take 1 tablet (20 mg total) by mouth daily. (For blood pressure and/or kidney protection.)  Let's try olmesartan 20 mg and she can continue the hydrochlorothiazide.  Ganglion cyst of volar aspect of right wrist  She is to get a gel pad so she is not resting her right wrist directly on the desk at work.  She can even wear a Velcro wrist brace at bedtime as it may relieve some of the inflammation and hopefully have the cyst resolve on its own.   I told her it is much too small to aspirate.  Severe obesity (BMI 35.0-35.9 with comorbidity) ([facility])  -     Phentermine HCl 15 MG Oral Cap; Take 1 capsule (15 mg total) by mouth every morning. For weight loss  She is willing to try medication as she is quite frustrated.  I think phentermine would be a good start.  Her EKG has been normal.      Referrals (if applicable)  No orders of the defined types were placed in this encounter.      Follow up if symptoms persist.  Take medicine (if given) as prescribed.  Approach to treatment discussed and patient/family member understands and agrees to plan.     Return in about 6 weeks (around 7/25/2024) for Weight followup, High Blood Pressure followup.    Patient Instructions   Stop the amlodipine as it is causing swelling of your legs.  Will get a place you on Benicar 20 mg instead for blood pressure.  Continue the hydrochlorothiazide.  For weight loss we can start you on phentermine 15 mg in the morning.    Silva Gutierrez    6/13/2024    By signing my name below, ISilva,  attest that this documentation has been prepared under the direction and in the presence of Joseph Verdin DO.   Electronically Signed: Silva Gutierrez, 6/13/2024, 3:26 PM.      I, Joseph Verdin DO,  personally performed the services described in this documentation. All medical record entries made by the scribe were at my direction and in my presence.  I have reviewed the chart and discharge instructions (if applicable) and agree that the record reflects my personal performance and is accurate and complete.  Joseph Verdin DO, 6/13/2024, 4:29 PM

## 2024-06-19 ENCOUNTER — NURSE ONLY (OUTPATIENT)
Dept: ALLERGY | Facility: CLINIC | Age: 54
End: 2024-06-19

## 2024-06-19 DIAGNOSIS — J30.89 ENVIRONMENTAL AND SEASONAL ALLERGIES: Primary | ICD-10-CM

## 2024-06-19 PROCEDURE — 95117 IMMUNOTHERAPY INJECTIONS: CPT | Performed by: ALLERGY & IMMUNOLOGY

## 2024-07-17 ENCOUNTER — NURSE ONLY (OUTPATIENT)
Dept: ALLERGY | Facility: CLINIC | Age: 54
End: 2024-07-17

## 2024-07-17 DIAGNOSIS — J30.89 ENVIRONMENTAL AND SEASONAL ALLERGIES: Primary | ICD-10-CM

## 2024-07-17 PROCEDURE — 95117 IMMUNOTHERAPY INJECTIONS: CPT | Performed by: ALLERGY & IMMUNOLOGY

## 2024-08-13 ENCOUNTER — NURSE ONLY (OUTPATIENT)
Dept: ALLERGY | Facility: CLINIC | Age: 54
End: 2024-08-13

## 2024-08-13 ENCOUNTER — OFFICE VISIT (OUTPATIENT)
Dept: ALLERGY | Facility: CLINIC | Age: 54
End: 2024-08-13

## 2024-08-13 VITALS
WEIGHT: 214 LBS | DIASTOLIC BLOOD PRESSURE: 89 MMHG | RESPIRATION RATE: 20 BRPM | OXYGEN SATURATION: 99 % | HEIGHT: 63 IN | BODY MASS INDEX: 37.92 KG/M2 | HEART RATE: 83 BPM | TEMPERATURE: 99 F | SYSTOLIC BLOOD PRESSURE: 141 MMHG

## 2024-08-13 DIAGNOSIS — Z23 FLU VACCINE NEED: ICD-10-CM

## 2024-08-13 DIAGNOSIS — H10.10 SEASONAL AND PERENNIAL ALLERGIC RHINOCONJUNCTIVITIS: Primary | ICD-10-CM

## 2024-08-13 DIAGNOSIS — J30.89 SEASONAL AND PERENNIAL ALLERGIC RHINOCONJUNCTIVITIS: Primary | ICD-10-CM

## 2024-08-13 DIAGNOSIS — Z23 NEED FOR COVID-19 VACCINE: ICD-10-CM

## 2024-08-13 DIAGNOSIS — J30.2 SEASONAL AND PERENNIAL ALLERGIC RHINOCONJUNCTIVITIS: Primary | ICD-10-CM

## 2024-08-13 DIAGNOSIS — Z91.040 LATEX ALLERGY: ICD-10-CM

## 2024-08-13 DIAGNOSIS — J98.01 BRONCHOSPASM: ICD-10-CM

## 2024-08-13 DIAGNOSIS — J30.89 ENVIRONMENTAL AND SEASONAL ALLERGIES: Primary | ICD-10-CM

## 2024-08-13 PROCEDURE — 3079F DIAST BP 80-89 MM HG: CPT | Performed by: ALLERGY & IMMUNOLOGY

## 2024-08-13 PROCEDURE — 99214 OFFICE O/P EST MOD 30 MIN: CPT | Performed by: ALLERGY & IMMUNOLOGY

## 2024-08-13 PROCEDURE — 95117 IMMUNOTHERAPY INJECTIONS: CPT | Performed by: ALLERGY & IMMUNOLOGY

## 2024-08-13 PROCEDURE — 3008F BODY MASS INDEX DOCD: CPT | Performed by: ALLERGY & IMMUNOLOGY

## 2024-08-13 PROCEDURE — 3077F SYST BP >= 140 MM HG: CPT | Performed by: ALLERGY & IMMUNOLOGY

## 2024-08-13 NOTE — PROGRESS NOTES
Binta Millan is a 54 year old female.    HPI:   No chief complaint on file.    Patient is a 54-year-old female who presents for follow-up with a chief complaint of allergies  Patient last seen by me in August 2022  History of allergic rhinitis and currently on maintenance dose immunotherapy to trees mold dust mite and dog.  Last immunotherapy was July 17, 2024.    Medications listed include triamcinolone albuterol      Patient with history of allergic rhinitis bronchospasm and latex allergy    Immunizations reviewed.  COVID-vaccine x 2 doses last in 2021  Flu vaccine from September 2023    Today patient reports    Ar:  Active or persistent symptoms  Active meds: xyal astelin, flonase optivar   pets ; none  Denies adverse events with immunotherapy in the interim.  Overall immunotherapy is helping decrease symptoms and the need for medications  Much better with ait , 80% better with ait     Still avoiding latex.    Bronchospasm.  No ED visits or prednisone in the interim.  Denies current symptoms more than 2 days/week  No issues  in interim     Anxiety better too with better allergy control        HISTORY:  Past Medical History:    Anxiety state    Essential hypertension    Helicobacter pylori gastritis    Hiatal hernia    High blood pressure    Hypothyroidism    Insomnia    Sleep apnea    no machine      Past Surgical History:   Procedure Laterality Date    Colonoscopy  01/20/2011    Colonoscopy N/A 10/9/2020    Procedure: COLONOSCOPY;  Surgeon: Sumeet Echeverria MD;  Location: [facility] ENDOSCOPY    Egd  01/20/2011    Hand/finger surgery unlisted Right 1990    little finger osteotomy    Hemorrhoidectomy,external  10/22/2020    w/ skin tag ex    Hysterectomy  04/18/2022    TLH/BS -- essure intact      Family History   Problem Relation Age of Onset    Diabetes Father         type 1    Heart Disorder Father     Hypertension Father     Other (gout) Father     Other (arthritis) Father     Cancer Mother     Diabetes Mother      Hypertension Mother     Other (arthritis) Mother     Other (psoriasis) Son     Diabetes Brother       Social History:   Social History     Socioeconomic History    Marital status: Single    Number of children: 2   Occupational History    Occupation: realtor   Tobacco Use    Smoking status: Never     Passive exposure: Never    Smokeless tobacco: Never   Substance and Sexual Activity    Alcohol use: Yes     Alcohol/week: 2.0 standard drinks of alcohol     Types: 2 Glasses of wine per week    Drug use: No    Sexual activity: Not Currently     Partners: Male     Birth control/protection: Hysterectomy   Other Topics Concern    Caffeine Concern Yes     Comment: coffee 2 cups    Pt has a pacemaker No    Pt has a defibrillator No    Reaction to local anesthetic No        Medications (Active prior to today's visit):  Current Outpatient Medications   Medication Sig Dispense Refill    olmesartan 20 MG Oral Tab Take 1 tablet (20 mg total) by mouth daily. (For blood pressure and/or kidney protection.) 90 tablet 1    Phentermine HCl 15 MG Oral Cap Take 1 capsule (15 mg total) by mouth every morning. For weight loss 90 capsule 0    cyclobenzaprine 10 MG Oral Tab TAKE 1 TABLET BY MOUTH NIGHTLY AS NEEDED FOR MUSCLE SPASMS. CAN MAKE TIRED 30 tablet 0    diazePAM 5 MG Oral Tab Take 1 tablet (5 mg total) by mouth nightly as needed. 15 tablet 0    atorvastatin 10 MG Oral Tab Take 1 tablet (10 mg total) by mouth nightly. Pt needs appointment for repeat labs 90 tablet 3    amLODIPine 5 MG Oral Tab Take 1 tablet (5 mg total) by mouth daily. 90 tablet 1    desonide 0.05 % External Cream Apply sparingly and rub gently into the affected areas of eczema around the eyes 15 g 0    triamcinolone 0.1 % External Cream Apply 1 Application topically 2 (two) times daily. To dry itchy skin 80 g 2    Tretinoin 0.05 % External Cream Apply a small to face as directed at bedtime. (Patient not taking: Reported on 6/13/2024) 20 g 3    hydroCHLOROthiazide  25 MG Oral Tab Take 1 tablet (25 mg total) by mouth daily. 90 tablet 2    diclofenac 1 % External Gel Apply 2 g topically 2 (two) times daily as needed. To affected area 100 g 1    LEVOTHYROXINE 137 MCG Oral Tab TAKE 1 TABLET BY MOUTH DAILY 90 tablet 3    celecoxib (CELEBREX) 200 MG Oral Cap Take 1 capsule (200 mg total) by mouth daily with dinner. Take with food. This is for pain and inflammation. 30 capsule 0    albuterol (PROAIR HFA) 108 (90 Base) MCG/ACT Inhalation Aero Soln Inhale 2 puffs into the lungs every 4 (four) hours as needed for Wheezing. (Patient not taking: Reported on 6/13/2024) 1 each 0       Allergies:  Allergies   Allergen Reactions    Amlodipine SWELLING    Dust Mites SHORTNESS OF BREATH     Itchiness and SOB    Cats Claw, Uncaria Tomentosa ITCHING    Latex ITCHING    Mold ITCHING         ROS:   Allergic/Immuno:  See hpi  Cardiovascular:  Negative for irregular heartbeat/palpitations, chest pain, edema  Constitutional:  Negative night sweats,weight loss, irritability and lethargy  ENMT:  Negative for ear drainage, hearing loss and nasal drainage  Eyes:  Negative for eye discharge and vision loss  Gastrointestinal:  Negative for abdominal pain, diarrhea and vomiting  Integumentary:  Negative for pruritus and rash  Respiratory:  Negative for cough, dyspnea and wheezing    PHYSICAL EXAM:   Constitutional: responsive, no acute distress noted  Head/Face: NC/Atraumatic  Eyes/Vision: conjunctiva and lids are normal extraocular motion is intact   Ears/Audiometry: tympanic membranes are normal bilaterally hearing is grossly intact  Nose/Mouth/Throat: nose and throat are clear mucous membranes are moist   Neck/Thyroid: neck is supple without adenopathy  Lymphatic: no abnormal cervical, supraclavicular or axillary adenopathy is noted  Respiratory: normal to inspection lungs are clear to auscultation bilaterally normal respiratory effort   Cardiovascular: regular rate and rhythm no murmurs, gallups, or  rubs  Abdomen: soft non-tender non-distended  Skin/Hair: no unusual rashes present   Extremities: no edema, cyanosis, or clubbing     ASSESSMENT/PLAN:   Assessment   Encounter Diagnoses   Name Primary?    Seasonal and perennial allergic rhinoconjunctivitis Yes    Bronchospasm     Latex allergy     Flu vaccine need     Need for COVID-19 vaccine        #1 allergic rhinitis  Much improved with immunotherapy.  80% better by report.  Using Xyzal as needed.  Immunotherapy in office today followed by 30 minutes of observation without issue or incident    2.  Bronchospasm  No issues in the interim.  Albuterol as needed.  No ED visits or prednisone.  No persistent symptoms.  Much better with immunotherapy    3.  Latex allergy  Continue to avoid latex    4.  Flu vaccine recommended in the fall    5.  COVID vaccines were reviewed.  Recommend booster.  Most recent booster available since September 2023.  Reviewed I do not have the booster in my office    Follow-up in 1 year or sooner if needed         Orders This Visit:  No orders of the defined types were placed in this encounter.      Meds This Visit:  Requested Prescriptions      No prescriptions requested or ordered in this encounter       Imaging & Referrals:  None     8/13/2024  Jose Lemons MD    If medication samples were provided today, they were provided solely for patient education and training related to self administration of these medications.  Teaching, instruction and sample was provided to the patient by myself.  Teaching included  a review of potential adverse side effects as well as potential efficacy.  Patient's questions were answered in regards to medication administration and dosing and potential side effects. Teaching was provided via the teach back method

## 2024-09-10 DIAGNOSIS — E03.9 ACQUIRED HYPOTHYROIDISM: ICD-10-CM

## 2024-09-10 NOTE — TELEPHONE ENCOUNTER
Pt is requesting refill for the following medication           LEVOTHYROXINE 137 MCG Oral Tab, TAKE 1 TABLET BY MOUTH DAILY, Disp: 90 tablet, Rfl: 3

## 2024-09-13 NOTE — TELEPHONE ENCOUNTER
Please review. Protocol Failed; No Protocol    Requested Prescriptions   Pending Prescriptions Disp Refills    levothyroxine 137 MCG Oral Tab 90 tablet 3     Sig: Take 137 mcg by mouth daily.       Thyroid Medication Protocol Failed - 9/10/2024 11:17 AM        Failed - TSH in past 12 months        Passed - Last TSH value is normal     Lab Results   Component Value Date    TSH 2.740 09/07/2023                 Passed - In person appointment or virtual visit in the past 12 mos or appointment in next 3 mos     Recent Outpatient Visits              1 month ago [facility] and seasonal [facility]    [facility]    1 month ago Seasonal and perennial [facility]    [facility]    1 month ago Environmental and seasonal allergies    [facility] [place]    Nurse Only    2 months ago Environmental and seasonal allergies    [facility]    Nurse Only    [facility]                               Recent Outpatient Visits              1 month ago Environmental and seasonal allergies    [facility]    Nurse Only    1 month ago Seasonal and perennial allergic rh[facility]oconjunctivitis    [facility]    Office Visit    1 month ago Environmental and seasonal allergies    [facility]    Nurse Only    2 months ago Environmental and seasonal allergies    [facility]    Nurse Only    3 months ago Bilateral leg edema    [facility], Joseph Gunter,     Office Visit

## 2024-09-14 RX ORDER — LEVOTHYROXINE SODIUM 137 UG/1
137 TABLET ORAL DAILY
Qty: 90 TABLET | Refills: 0 | Status: SHIPPED | OUTPATIENT
Start: 2024-09-14

## 2024-09-23 ENCOUNTER — LAB ENCOUNTER (OUTPATIENT)
Dept: LAB | Facility: HOSPITAL | Age: 54
End: 2024-09-23
Attending: NURSE PRACTITIONER
Payer: COMMERCIAL

## 2024-09-23 ENCOUNTER — TELEPHONE (OUTPATIENT)
Dept: OBGYN CLINIC | Facility: CLINIC | Age: 54
End: 2024-09-23

## 2024-09-23 ENCOUNTER — PATIENT MESSAGE (OUTPATIENT)
Dept: OBGYN CLINIC | Facility: CLINIC | Age: 54
End: 2024-09-23

## 2024-09-23 DIAGNOSIS — E03.9 ACQUIRED HYPOTHYROIDISM: ICD-10-CM

## 2024-09-23 DIAGNOSIS — R39.15 URGENCY OF URINATION: ICD-10-CM

## 2024-09-23 DIAGNOSIS — R30.0 BURNING WITH URINATION: Primary | ICD-10-CM

## 2024-09-23 DIAGNOSIS — R30.0 BURNING WITH URINATION: ICD-10-CM

## 2024-09-23 LAB
BILIRUB UR QL: NEGATIVE
CLARITY UR: CLEAR
GLUCOSE UR-MCNC: NORMAL MG/DL
KETONES UR-MCNC: NEGATIVE MG/DL
LEUKOCYTE ESTERASE UR QL STRIP.AUTO: 250
NITRITE UR QL STRIP.AUTO: NEGATIVE
PH UR: 6.5 [PH] (ref 5–8)
PROT UR-MCNC: NEGATIVE MG/DL
SP GR UR STRIP: 1.01 (ref 1–1.03)
UROBILINOGEN UR STRIP-ACNC: NORMAL

## 2024-09-23 PROCEDURE — 87077 CULTURE AEROBIC IDENTIFY: CPT

## 2024-09-23 PROCEDURE — 81001 URINALYSIS AUTO W/SCOPE: CPT

## 2024-09-23 PROCEDURE — 87086 URINE CULTURE/COLONY COUNT: CPT

## 2024-09-23 PROCEDURE — 87186 SC STD MICRODIL/AGAR DIL: CPT

## 2024-09-23 NOTE — TELEPHONE ENCOUNTER
Pt calling to report burning with urination, urinary urgency, and incomplete bladder emptying that started 3-4 days ago. Order placed for UA and pt will go to the lab and call later for results. Advised to push water intake.

## 2024-09-23 NOTE — TELEPHONE ENCOUNTER
Patient called in and sent a my chart message, patient states have a bad bladder infection going on day 4.  Request to speak with a nurse

## 2024-09-23 NOTE — TELEPHONE ENCOUNTER
From: Binta Millan  To: Karen Yun  Sent: 9/23/2024 3:13 AM CDT  Subject: Bladder infection     Dr. Yun, I believe I have a bladder infection three days ago when I urinated it felt weird and slightly burning and is a very hard to not go to the bathroom without running. I woke up just now and did not make it to the bathroom, which is a very embarrassing. And it all started three days ago.

## 2024-09-23 NOTE — TELEPHONE ENCOUNTER
Patient was told to go to [facility] for a urinalysis. The order is not in place. Please advise.

## 2024-09-27 ENCOUNTER — TELEPHONE (OUTPATIENT)
Dept: OBGYN CLINIC | Facility: CLINIC | Age: 54
End: 2024-09-27

## 2024-09-27 RX ORDER — SULFAMETHOXAZOLE/TRIMETHOPRIM 800-160 MG
1 TABLET ORAL 2 TIMES DAILY
Qty: 6 TABLET | Refills: 0 | Status: SHIPPED | OUTPATIENT
Start: 2024-09-27 | End: 2024-09-30

## 2024-09-27 NOTE — TELEPHONE ENCOUNTER
Karen Yun MD  9/27/2024 12:32 PM CDT Back to Top      Bactrim DS bid x 3d       See results of UA 9/23/24, Patient aware of UTI  Bactrim DS bid x3days eRx'd to pharm. Patient aware.

## 2024-10-10 ENCOUNTER — HOSPITAL ENCOUNTER (OUTPATIENT)
Age: 54
Discharge: HOME | End: 2024-10-10
Payer: COMMERCIAL

## 2024-10-10 ENCOUNTER — APPOINTMENT (OUTPATIENT)
Dept: GENERAL RADIOLOGY | Age: 54
End: 2024-10-10
Attending: NURSE PRACTITIONER
Payer: COMMERCIAL

## 2024-10-10 ENCOUNTER — NURSE TRIAGE (OUTPATIENT)
Dept: FAMILY MEDICINE CLINIC | Facility: CLINIC | Age: 54
End: 2024-10-10

## 2024-10-10 VITALS
TEMPERATURE: 98 F | OXYGEN SATURATION: 100 % | RESPIRATION RATE: 18 BRPM | SYSTOLIC BLOOD PRESSURE: 129 MMHG | DIASTOLIC BLOOD PRESSURE: 85 MMHG | HEART RATE: 81 BPM

## 2024-10-10 DIAGNOSIS — S69.91XA INJURY OF RIGHT WRIST, INITIAL ENCOUNTER: Primary | ICD-10-CM

## 2024-10-10 PROCEDURE — 99213 OFFICE O/P EST LOW 20 MIN: CPT | Performed by: NURSE PRACTITIONER

## 2024-10-10 PROCEDURE — L3908 WHO COCK-UP NONMOLDE PRE OTS: HCPCS | Performed by: NURSE PRACTITIONER

## 2024-10-10 PROCEDURE — 73110 X-RAY EXAM OF WRIST: CPT | Performed by: NURSE PRACTITIONER

## 2024-10-10 NOTE — DISCHARGE INSTRUCTIONS
Wear the Velcro wrist splint for comfort you may take it off at night and sleep with the arm on a pillow ice pack to the wrist 2-3 times per day inside of a pillowcase or cloth take over-the-counter ibuprofen or Tylenol for pain follow-up with your primary care provider in a week avoid heavy lifting pushing and pulling for a week if you continue to have wrist pain follow-up with the orthopedic specialist.  If all of a sudden you pain location you did not initially have fingertips are blue and not pink cannot feel sensation or any new or worsening symptoms go to the nearest emergency department.

## 2024-10-10 NOTE — ED PROVIDER NOTES
Patient Seen in: [facility]      History     Chief Complaint   Patient presents with    Wrist Pain     Stated Complaint: rt wrist pain    Subjective:   HPI      This is a 54-year-old female who is right-hand dominant with a history of hiatal hernia, insomnia, sleep apnea, hypertension, hypothyroidism, anxiety presenting with a fall and a right wrist injury.  Patient states yesterday she fell and she was trying to prevent herself from falling and stretched out her hand and wrist now has pain over her wrist.  Has not taken anything today for pain at basically hurts when she moves it but she is able to move it.  Denies any numbness or tingling in the extremity head injury or trauma or LOC denies any blood thinners.    Objective:     No pertinent past medical history.            No pertinent past surgical history.              No pertinent social history.            Review of Systems    Positive for stated complaint: rt wrist pain  Other systems are as noted in HPI.  Constitutional and vital signs reviewed.      All other systems reviewed and negative except as noted above.    Physical Exam     ED Triage Vitals [10/10/24 1723]   BP (!) 141/97   Pulse 81   Resp 18   Temp 98.2 °F (36.8 °C)   Temp src Temporal   SpO2 100 %   O2 Device None (Room air)       Current Vitals:   Vital Signs  BP: 129/85  Pulse: 81  Resp: 18  Temp: 98.2 °F (36.8 °C)  Temp src: Temporal    Oxygen Therapy  SpO2: 100 %  O2 Device: None (Room air)        Physical Exam  Vitals and nursing note reviewed.   Constitutional:       Appearance: Normal appearance.   HENT:      Head: Atraumatic.      Right Ear: External ear normal.      Left Ear: External ear normal.      Nose: Nose normal.      Mouth/Throat:      Mouth: Mucous membranes are moist.      Pharynx: Oropharynx is clear.   Eyes:      Conjunctiva/sclera: Conjunctivae normal.   Cardiovascular:      Rate and Rhythm: Normal rate.   Pulmonary:      Effort: Pulmonary effort is normal.    Chest:      Chest wall: No tenderness.   Abdominal:      Palpations: Abdomen is soft.      Tenderness: There is no abdominal tenderness. There is no right CVA tenderness or left CVA tenderness.   Musculoskeletal:         General: Normal range of motion.        Arms:       Cervical back: Normal range of motion.      Comments: Cervical thoracic lumbar spine no midline TTP or step-offs  Normal ROM entire left upper extremity and bilateral lower extremities normal ROM no TTP to the entire left upper extremity or bilateral lower extremities radial pulse 2+ on the left side distal pulses bilaterally 2+   Skin:     General: Skin is warm and dry.      Capillary Refill: Capillary refill takes less than 2 seconds.   Neurological:      General: No focal deficit present.      Mental Status: She is alert and oriented to person, place, and time.   Psychiatric:         Mood and Affect: Mood normal.             ED Course   Labs Reviewed - No data to display  XR WRIST COMPLETE (MIN 3 VIEWS), RIGHT (CPT=73110)    Result Date: 10/10/2024  CONCLUSION:   No radiographically visible acute osseous injury of the right wrist.    Dictated by (CST): Lars Howard MD on 10/10/2024 at 5:42 PM     Finalized by (CST): Lars Howard MD on 10/10/2024 at 5:44 PM                MDM       Medical Decision Making  54-year-old female nontoxic-appearing blood pressure slightly elevated asymptomatic with elevated blood pressure reading presenting with a wrist injury after a fall.  DDx wrist sprain versus fracture versus contusion.  No clinical indication for advanced imaging but x-ray of the wrist will be ordered.    X-ray independently viewed by this provider no fracture noted    Discussed x-ray results with the patient discussed the read by the radiologist.  Patient with no snuffbox tenderness so no concern for a scaphoid fracture.  Patient will be placed in a Velcro wrist splint per the patient she would like to take that at home she needs to  shower she has a lot of allergies so she would end up having to wash the Velcro wrist splint so she was provided with Velcro wrist splint.  Discussed RICE therapy ibuprofen and Tylenol for pain.  Discussed outpatient follow-up with PCP and Ortho if symptoms persist or worsen.  All education instructions placed in discharge paperwork.  Patient acknowledges understanding discharge instructions.    Problems Addressed:  Injury of right wrist, initial encounter: acute illness or injury    Amount and/or Complexity of Data Reviewed  Radiology: ordered and independent interpretation performed. Decision-making details documented in ED Course.    Risk  OTC drugs.        Disposition and Plan     Clinical Impression:  1. Injury of right wrist, initial encounter         Disposition:  Discharge  10/10/2024  5:50 pm    Follow-up:  Joseph Verdin DO  [address] [facility]  [place] [address]  [phone]    In 1 week      Alec Alberts MD  [address] [facility] [facility] [address]  [place] [id number] [phone]      Orthopedic spcialist to follow-up with his symptoms persist or worsen          Medications Prescribed:  Current Discharge Medication List              Supplementary Documentation:

## 2024-10-10 NOTE — TELEPHONE ENCOUNTER
Action Requested: Summary for Provider     []  Critical Lab, Recommendations Needed  [] Need Additional Advice  [x]   FYI    []   Need Orders  [] Need Medications Sent to Pharmacy  []  Other     SUMMARY: Hand/wrist injury, caught herself while falling; pain is present while moving wrist--> 5-6/10; unable to lift objects. Advised [facility] --> agreeable. [See below for more details]    Reason for call: Arm Injury/Wrist Injury  Onset: 10/9/24    Reason for Disposition   Can't use injured hand normally (e.g., make a fist, open fully, hold a glass of water)    Protocols used: Hand and Wrist Injury-A-[place]      Patient called states her wrist and hand from right arm hurts--> 0.5/10; with movement pain: 5-6/10. She has limited range of motion of her wrist; she has some difficulty holding items. She caught herself when she slipped, caught her fall. Denies any deformity or paresthesias. Possible mild swelling present. Refer to system/assessment yes/no answers. Same day evaluation was advised --> no visits available in office--> advised [facility] -->agreeable. Home Care Advice discussed, per protocol. Patient instructed any new or worsening symptoms [reviewed] seek immediate medical attention--> Emergency Department. Patient verbalized understanding. No further questions or concerns at this time.

## 2024-10-30 ENCOUNTER — TELEPHONE (OUTPATIENT)
Dept: ALLERGY | Facility: CLINIC | Age: 54
End: 2024-10-30

## 2024-10-30 NOTE — TELEPHONE ENCOUNTER
Call noted.  Last immunotherapy was August 13.  Patient is approximately 10 weeks overdue for immunotherapy.  Please decrease immunotherapy by 5 doses if patient wishes to continue with immunotherapy.  If patient chooses not to continue with immunotherapy then would recommend to continue with medications and avoidance measures.  Typically we recommend a minimum of 3 to 5 years of maintenance dosing for optimal effect

## 2024-10-30 NOTE — TELEPHONE ENCOUNTER
RN left voicemail for patient with Dr. Lemons's advice/recommendations listed below  Left office hours and call back number   May schedule patient for allergy injection if she wishes to continue with shots

## 2024-10-30 NOTE — TELEPHONE ENCOUNTER
Patient calling   Verified patient's name and birthday  Per patient last received injection on 08/13/24  Was on top dose for about 2 years  Patient asking if she should continue with shots - knows she will be decreased and has to come weekly to build back up  RN offered to make an injection appointment but per patient wishes to hear Dr. Lemons's advice/recommendations  Patient offers that her symptoms have been ok these last couple of months but is nervous they will return     RN advised will send message to Dr. Lemons and once we have further advice/recommendation we will give her a call back

## 2024-11-25 ENCOUNTER — NURSE TRIAGE (OUTPATIENT)
Dept: FAMILY MEDICINE CLINIC | Facility: CLINIC | Age: 54
End: 2024-11-25

## 2024-11-25 NOTE — TELEPHONE ENCOUNTER
Action Requested: Summary for Provider     []  Critical Lab, Recommendations Needed  [] Need Additional Advice  []   FYI    []   Need Orders  [] Need Medications Sent to Pharmacy  []  Other     SUMMARY: Disposition per protocol  is to be seen in office within 3 days.      Reason for call: Knee Pain  Onset: 2-3 weeks ago.      Patient calling,verified name and date of birth. Right knee has been swollen for 2-3 weeks after playing laser tag. Sometimes pain with standing up after being seated for a while.  History of previous knee injury.Patient asks ffor prescription antiinflammatory medication. Reviewed care advice including keep appointment as scheduled, RICE, call back if pain or swelling worsens. Patient verbalizes understanding and agrees to plan of care.      Reason for Disposition   Swollen knee joint (no fever or redness)    Protocols used: Knee Pain-A-OH

## 2024-12-02 ENCOUNTER — OFFICE VISIT (OUTPATIENT)
Dept: FAMILY MEDICINE CLINIC | Facility: CLINIC | Age: 54
End: 2024-12-02

## 2024-12-02 ENCOUNTER — HOSPITAL ENCOUNTER (OUTPATIENT)
Dept: GENERAL RADIOLOGY | Age: 54
Discharge: HOME | End: 2024-12-02
Attending: FAMILY MEDICINE
Payer: COMMERCIAL

## 2024-12-02 VITALS
SYSTOLIC BLOOD PRESSURE: 150 MMHG | WEIGHT: 217 LBS | BODY MASS INDEX: 38.45 KG/M2 | HEART RATE: 80 BPM | DIASTOLIC BLOOD PRESSURE: 90 MMHG | HEIGHT: 63 IN

## 2024-12-02 DIAGNOSIS — M25.561 ACUTE PAIN OF RIGHT KNEE: Primary | ICD-10-CM

## 2024-12-02 DIAGNOSIS — M25.461 EFFUSION OF RIGHT KNEE: ICD-10-CM

## 2024-12-02 DIAGNOSIS — M17.11 PRIMARY OSTEOARTHRITIS OF RIGHT KNEE: ICD-10-CM

## 2024-12-02 DIAGNOSIS — M25.561 ACUTE PAIN OF RIGHT KNEE: ICD-10-CM

## 2024-12-02 DIAGNOSIS — I10 ESSENTIAL HYPERTENSION: ICD-10-CM

## 2024-12-02 PROCEDURE — 73562 X-RAY EXAM OF KNEE 3: CPT | Performed by: FAMILY MEDICINE

## 2024-12-02 PROCEDURE — 3008F BODY MASS INDEX DOCD: CPT | Performed by: FAMILY MEDICINE

## 2024-12-02 PROCEDURE — G2211 COMPLEX E/M VISIT ADD ON: HCPCS | Performed by: FAMILY MEDICINE

## 2024-12-02 PROCEDURE — 3077F SYST BP >= 140 MM HG: CPT | Performed by: FAMILY MEDICINE

## 2024-12-02 PROCEDURE — 99214 OFFICE O/P EST MOD 30 MIN: CPT | Performed by: FAMILY MEDICINE

## 2024-12-02 PROCEDURE — 3080F DIAST BP >= 90 MM HG: CPT | Performed by: FAMILY MEDICINE

## 2024-12-02 RX ORDER — PREDNISONE 20 MG/1
TABLET ORAL
Qty: 10 TABLET | Refills: 0 | Status: SHIPPED | OUTPATIENT
Start: 2024-12-02

## 2024-12-02 NOTE — PROGRESS NOTES
Patient ID: Binta Millan is a 54 year old female.    Knee Pain       Chief Complaint   Patient presents with    Knee Pain     Right knee pain and swelling, ongoing for approx 3 weeks     Last seen on 06/13/2024.      Pt c/o right knee swelling and pain for the last three weeks. She was playing laser tag with kids and was running around but did not fall. The pain is primarily associated with the swelling and has caused limping. When she stands from a seated position she has to take a few steps for the knee to feel better as the first few steps are somewhat painful. Pt states she has to be careful with her knee as certain movements result in swelling; she is no longer is able to cross her legs. Hx arthritis in bilateral knees. Pt has knee compression stockings at home but hasn't been wearing them. I discussed with her to start using as she states it did help in the past.  There is no locking of the knee.    I reviewed pt's vitals. She is compliant with blood pressure medications and thinks elevated blood pressure could be due to pain. I discussed with her.       Wt Readings from Last 6 Encounters:   12/02/24 217 lb   08/13/24 214 lb   06/13/24 207 lb   04/02/24 200 lb   02/27/24 202 lb   01/18/24 201 lb 12.8 oz       BMI Readings from Last 6 Encounters:   12/02/24 38.44 kg/m²   08/13/24 37.91 kg/m²   06/13/24 35.53 kg/m²   04/02/24 34.33 kg/m²   02/27/24 34.67 kg/m²   01/18/24 34.64 kg/m²       BP Readings from Last 6 Encounters:   12/02/24 150/90   10/10/24 129/85   08/13/24 141/89   06/13/24 129/81   04/02/24 (!) 147/104   02/27/24 (!) 133/100         Review of Systems   Respiratory:  Negative for shortness of breath.    Cardiovascular:  Negative for chest pain.   Musculoskeletal:  Positive for arthralgias and joint swelling.           Medical History:      Past Medical History:    Anxiety state    Essential hypertension    Helicobacter pylori gastritis    Hiatal hernia    High blood pressure     Hypothyroidism    Insomnia    Sleep apnea    no machine       Past Surgical History:   Procedure Laterality Date    Colonoscopy  01/20/2011    Colonoscopy N/A 10/9/2020    Procedure: COLONOSCOPY;  Surgeon: Sumeet Echeverria MD;  Location: [facility] ENDOSCOPY    Egd  01/20/2011    Hand/finger surgery unlisted Right 1990    little finger osteotomy    Hemorrhoidectomy,external  10/22/2020    w/ skin tag ex    Hysterectomy  04/18/2022    TLH/BS -- essure intact          Current Outpatient Medications   Medication Sig Dispense Refill    predniSONE 20 MG Oral Tab Take 2 by mouth at same time daily for 5 days. (Best taken at BREAKFAST or LUNCH) 10 tablet 0    levothyroxine 137 MCG Oral Tab Take 137 mcg by mouth daily. Pt needs labs-no refills until completed 90 tablet 0    olmesartan 20 MG Oral Tab Take 1 tablet (20 mg total) by mouth daily. (For blood pressure and/or kidney protection.) 90 tablet 1    diazePAM 5 MG Oral Tab Take 1 tablet (5 mg total) by mouth nightly as needed. 15 tablet 0    atorvastatin 10 MG Oral Tab Take 1 tablet (10 mg total) by mouth nightly. Pt needs appointment for repeat labs 90 tablet 3    amLODIPine 5 MG Oral Tab Take 1 tablet (5 mg total) by mouth daily. 90 tablet 1    triamcinolone 0.1 % External Cream Apply 1 Application topically 2 (two) times daily. To dry itchy skin 80 g 2    hydroCHLOROthiazide 25 MG Oral Tab Take 1 tablet (25 mg total) by mouth daily. 90 tablet 2    celecoxib (CELEBREX) 200 MG Oral Cap Take 1 capsule (200 mg total) by mouth daily with dinner. Take with food. This is for pain and inflammation. 30 capsule 0    albuterol (PROAIR HFA) 108 (90 Base) MCG/ACT Inhalation Aero Soln Inhale 2 puffs into the lungs every 4 (four) hours as needed for Wheezing. 1 each 0    Phentermine HCl 15 MG Oral Cap Take 1 capsule (15 mg total) by mouth every morning. For weight loss (Patient not taking: Reported on 12/2/2024) 90 capsule 0    cyclobenzaprine 10 MG Oral Tab TAKE 1 TABLET BY MOUTH NIGHTLY AS  NEEDED FOR MUSCLE SPASMS. CAN MAKE TIRED (Patient not taking: Reported on 12/2/2024) 30 tablet 0    desonide 0.05 % External Cream Apply sparingly and rub gently into the affected areas of eczema around the eyes (Patient not taking: Reported on 12/2/2024) 15 g 0    Tretinoin 0.05 % External Cream Apply a small to face as directed at bedtime. (Patient not taking: Reported on 12/2/2024) 20 g 3    diclofenac 1 % External Gel Apply 2 g topically 2 (two) times daily as needed. To affected area (Patient not taking: Reported on 12/2/2024) 100 g 1     Allergies:Allergies[1]     Physical Exam:       Physical Exam  Blood pressure 150/90, pulse 80, height 5' 3\" (1.6 m), weight 217 lb, last menstrual period 04/10/2022, not currently breastfeeding.    Vitals:    12/02/24 1355 12/02/24 1403   BP: (!) 150/103 150/90   BP Location: Right arm    Patient Position: Sitting    Cuff Size: adult    Pulse: 80    Weight: 217 lb    Height: 5' 3\" (1.6 m)         Physical Exam   Constitutional: Patient is oriented to person, place, and time. Patient appears well-developed and well-nourished. No distress.   Head: Normocephalic.   Neurological: Patient is alert and oriented to person, place, and time.   Skin: Skin is warm.   Psychiatry: Normal mood and affect.                                             KNEE EXAM    KNEE EXAM: RIGHT   Range of Motion EXT LAG = 0   FLEX = 150 (Degrees)       Effusion 1+   Swelling None   Erythema None   Atrophy None       Strength       Quadriceps 5/5      Hamstrings 5/5       Joint line tenderness       Medial Positive      Lateral Positive      Pes anserinus None      Patellar tendon None       Tanna's Medial Negative   Tanna's Lateral Negative        Stability Testing       Anterior Drawer Negative      Posterior Drawer Negative      Lachman Negative      Pivot Shift Negative              Varus Stress       0 Degrees Negative      30 Degrees Negative       Valgus Stress        0 Degrees Negative      30  Degrees Negative       Patellar apprehension Negative   Patellofemoral pain Negative        Medial facet pain        Lateral facet pain    Patellofemoral Crepitation Negative   Hip Motion Normal   Gait There is a mild limp          Vitals reviewed.           Assessment/Plan:      Diagnoses and all orders for this visit:    Acute pain of right knee  -     XR KNEE (3 VIEWS), RIGHT (CPT=73562); Future  -     predniSONE 20 MG Oral Tab; Take 2 by mouth at same time daily for 5 days. (Best taken at BREAKFAST or LUNCH)  Possibly flareup of her arthritis that she has had in the past.  I do not feel any ligamentous injury and negative Tanna's.  She is not having any clicking on the joint lines.  We will do a x-ray and start her on 5 days of prednisone.  No other anti-inflammatories while you are on the prednisone.  She understands a plan.  Wear the knee sleeve.  Effusion of right knee  -     XR KNEE (3 VIEWS), RIGHT (CPT=73562); Future  -     predniSONE 20 MG Oral Tab; Take 2 by mouth at same time daily for 5 days. (Best taken at BREAKFAST or LUNCH)    Primary osteoarthritis of right knee  -     XR KNEE (3 VIEWS), RIGHT (CPT=73562); Future  -     predniSONE 20 MG Oral Tab; Take 2 by mouth at same time daily for 5 days. (Best taken at BREAKFAST or LUNCH)    Essential hypertension  Just in October her blood pressure was wonderful.  We will continue to follow this.      Referrals (if applicable)  No orders of the defined types were placed in this encounter.      Follow up if symptoms persist.  Take medicine (if given) as prescribed.  Approach to treatment discussed and patient/family member understands and agrees to plan.     No follow-ups on file.    There are no Patient Instructions on file for this visit.    Tea Singh    12/2/2024    By signing my name below, I, Tea Singh,  attest that this documentation has been prepared under the direction and in the presence of Joseph Verdin DO.   Electronically  Signed: Tea Singh, 12/2/2024, 1:59 PM.    I, Joseph Verdin DO,  personally performed the services described in this documentation. All medical record entries made by the scribe were at my direction and in my presence.  I have reviewed the chart and discharge instructions (if applicable) and agree that the record reflects my personal performance and is accurate and complete.  Joseph Verdin DO, 12/2/2024, 2:17 PM                  [1]   Allergies  Allergen Reactions    Amlodipine SWELLING    Dust Mites SHORTNESS OF BREATH     Itchiness and SOB    Cats Claw, Uncaria Tomentosa ITCHING    Latex ITCHING    Mold ITCHING

## 2024-12-13 ENCOUNTER — TELEPHONE (OUTPATIENT)
Dept: FAMILY MEDICINE CLINIC | Facility: CLINIC | Age: 54
End: 2024-12-13

## 2024-12-13 NOTE — TELEPHONE ENCOUNTER
Patient calling with condition update,verified name and date of birth.      She reports that right knee swelling improved with  prednisone but some swelling persists.  She asks if  repeating the Prednisone would reduce the swelling completely?  She is concerned about walking on vacation next week.  Dr Verdin, Please advise.   2024 15:30

## 2024-12-14 NOTE — TELEPHONE ENCOUNTER
I do not think another course of prednisone would help.  I do not know when she is leaving on vacation but she may benefit much more from a steroid injection into the knee by me.  If she agrees go ahead and set her up for a focused visit for that this coming week and feel free to use a reservation 24 if needed.

## 2024-12-16 ENCOUNTER — HOSPITAL ENCOUNTER (OUTPATIENT)
Dept: GENERAL RADIOLOGY | Age: 54
Discharge: HOME | End: 2024-12-16
Attending: FAMILY MEDICINE
Payer: COMMERCIAL

## 2024-12-16 ENCOUNTER — OFFICE VISIT (OUTPATIENT)
Dept: FAMILY MEDICINE CLINIC | Facility: CLINIC | Age: 54
End: 2024-12-16

## 2024-12-16 VITALS
DIASTOLIC BLOOD PRESSURE: 89 MMHG | HEIGHT: 63 IN | WEIGHT: 216 LBS | BODY MASS INDEX: 38.27 KG/M2 | SYSTOLIC BLOOD PRESSURE: 138 MMHG | HEART RATE: 81 BPM

## 2024-12-16 DIAGNOSIS — M25.561 ACUTE PAIN OF RIGHT KNEE: Primary | ICD-10-CM

## 2024-12-16 DIAGNOSIS — M79.89 SWELLING OF RIGHT THUMB: ICD-10-CM

## 2024-12-16 DIAGNOSIS — M17.11 PRIMARY OSTEOARTHRITIS OF RIGHT KNEE: ICD-10-CM

## 2024-12-16 DIAGNOSIS — M25.461 EFFUSION OF RIGHT KNEE: ICD-10-CM

## 2024-12-16 PROCEDURE — 73140 X-RAY EXAM OF FINGER(S): CPT | Performed by: FAMILY MEDICINE

## 2024-12-16 RX ORDER — TRIAMCINOLONE ACETONIDE 40 MG/ML
80 INJECTION, SUSPENSION INTRA-ARTICULAR; INTRAMUSCULAR ONCE
Status: SHIPPED | OUTPATIENT
Start: 2024-12-16

## 2024-12-16 NOTE — PROGRESS NOTES
Patient ID: Binta Millan is a 54 year old female.    HPI  Chief Complaint   Patient presents with    Injection     Last seen on 12/02/2024.      Patient is right knee is better although it still hurts.  I reviewed the x-ray report with her.  It does show arthritis that has progressed some.  She states that the swelling is much better.  She would like to try a steroid injection as she is going on a 2-week vacation in the near future and does not want it to hurt when she walks.    She has a second complaint today.  She also c/o IP joint of her right thumb getting swollen and gives her some pain. Pt is right handed. I discussed with her.  She denies any injury.  She states she has full range of motion still.      Wt Readings from Last 6 Encounters:   12/16/24 216 lb   12/02/24 217 lb   08/13/24 214 lb   06/13/24 207 lb   04/02/24 200 lb   02/27/24 202 lb       BMI Readings from Last 6 Encounters:   12/16/24 38.26 kg/m²   12/02/24 38.44 kg/m²   08/13/24 37.91 kg/m²   06/13/24 35.53 kg/m²   04/02/24 34.33 kg/m²   02/27/24 34.67 kg/m²       BP Readings from Last 6 Encounters:   12/16/24 138/89   12/02/24 150/90   10/10/24 129/85   08/13/24 141/89   06/13/24 129/81   04/02/24 (!) 147/104         Review of Systems   Respiratory:  Negative for shortness of breath.    Cardiovascular:  Negative for chest pain.   Musculoskeletal:  Positive for arthralgias.           Medical History:      Past Medical History:    Anxiety state    Essential hypertension    Helicobacter pylori gastritis    Hiatal hernia    High blood pressure    Hypothyroidism    Insomnia    Sleep apnea    no machine       Past Surgical History:   Procedure Laterality Date    Colonoscopy  01/20/2011    Colonoscopy N/A 10/9/2020    Procedure: COLONOSCOPY;  Surgeon: Sumeet Echeverria MD;  Location: [facility] ENDOSCOPY    Egd  01/20/2011    Hand/finger surgery unlisted Right 1990    little finger osteotomy    Hemorrhoidectomy,external  10/22/2020    w/ skin tag ex     Hysterectomy  04/18/2022    TLH/BS -- essure intact          Current Outpatient Medications   Medication Sig Dispense Refill    predniSONE 20 MG Oral Tab Take 2 by mouth at same time daily for 5 days. (Best taken at BREAKFAST or LUNCH) 10 tablet 0    levothyroxine 137 MCG Oral Tab Take 137 mcg by mouth daily. Pt needs labs-no refills until completed 90 tablet 0    diazePAM 5 MG Oral Tab Take 1 tablet (5 mg total) by mouth nightly as needed. 15 tablet 0    atorvastatin 10 MG Oral Tab Take 1 tablet (10 mg total) by mouth nightly. Pt needs appointment for repeat labs 90 tablet 3    amLODIPine 5 MG Oral Tab Take 1 tablet (5 mg total) by mouth daily. 90 tablet 1    hydroCHLOROthiazide 25 MG Oral Tab Take 1 tablet (25 mg total) by mouth daily. 90 tablet 2    albuterol (PROAIR HFA) 108 (90 Base) MCG/ACT Inhalation Aero Soln Inhale 2 puffs into the lungs every 4 (four) hours as needed for Wheezing. 1 each 0     Allergies:Allergies[1]     Physical Exam:       Physical Exam  Blood pressure 138/89, pulse 81, height 5' 3\" (1.6 m), weight 216 lb, last menstrual period 04/10/2022, not currently breastfeeding.    Physical Exam   Constitutional: Patient is oriented to person, place, and time. Patient appears well-developed and well-nourished. No distress.   Neurological: Patient is alert and oriented to person, place, and time.   Skin: Skin is warm.   Psychiatry: Normal mood and affect.  Right Thumb: No swelling or redness.  She is not tender over the MCP joint.  She had some minimal tenderness over the IP joint dorsally.  No ligament laxity and she had full strength and range of motion with flexion and extension against resistance.    Vitals reviewed.                                             KNEE EXAM    KNEE EXAM: RIGHT   Range of Motion EXT LAG = 0   FLEX = 150 (Degrees)       Effusion Mild   Swelling None   Erythema None   Atrophy None       Strength       Quadriceps 5/5      Hamstrings 5/5       Joint line tenderness        Medial Positive      Lateral Positive      Pes anserinus None      Patellar tendon None       Tanna's Medial Negative   Tanna's Lateral Negative        Stability Testing       Anterior Drawer Negative      Posterior Drawer Negative      Lachman Negative      Pivot Shift Negative              Varus Stress       0 Degrees Negative      30 Degrees Negative       Valgus Stress        0 Degrees Negative      30 Degrees Negative       Patellar apprehension Negative   Patellofemoral pain Positive        Medial facet pain Positive       Lateral facet pain Positive   Patellofemoral Crepitation Mild   Hip Motion Normal   Gait Normal       Right Knee Joint Injection  Pre-procedure care:  Consent was obtained, Procedure/risks were explained, Questions were answered, Patient was prepped and draped in the usual sterile fashion, Correct side and site confirmed, and Correct patient identified    Injection Detail:  Procedure:  Site 1:  arthrocentesis major joint. Location: Right knee  lateral joint line. Method:  injection    Site Prepped:  Yes  Technique:  aseptic technique with povidone-iodine and alcohol    Needle gauge:  22 gauge    Placement confirmed by:  Manual palpation    Fluid:  N/A    Milliliter(s) fluid withdrawn:  N/A    Medication:  triamcinolone acetonide  Dose:  80 mg (2 ml) kenalog, along with 3 ml 1% lidocaine w/o epi and 3 ml marcaine     Sterile dressing:  Pressure - Yes  Adhesive - Yes    Patient response:  The patient did tolerate the procedure well.      Plan:  Appropriate post injection instructions given.              Assessment/Plan:      Diagnoses and all orders for this visit:    Acute pain of right knee  -     arthrocentesis major joint  -     triamcinolone acetonide (Kenalog-40) 40 MG/ML injection 80 mg  Tolerated procedure wonderfully.  No complications.  Primary osteoarthritis of right knee  -     arthrocentesis major joint  -     triamcinolone acetonide (Kenalog-40) 40 MG/ML injection 80  mg    Effusion of right knee  -     arthrocentesis major joint  -     triamcinolone acetonide (Kenalog-40) 40 MG/ML injection 80 mg    Swelling of right thumb  -     XR FINGER (MIN 2 VIEW), RIGHT  (CPT=73140); Future  She had a second issue today which was the swelling and sometimes discomfort of the right thumb.  There is no locking or triggering of the thumb.  X-ray ordered.      Referrals (if applicable)  No orders of the defined types were placed in this encounter.      Follow up if symptoms persist.  Take medicine (if given) as prescribed.  Approach to treatment discussed and patient/family member understands and agrees to plan.     No follow-ups on file.    There are no Patient Instructions on file for this visit.    Tea Singh    12/16/2024    By signing my name below, Tea LANGFORD,  attest that this documentation has been prepared under the direction and in the presence of Joseph Verdin DO.   Electronically Signed: Tea Singh, 12/16/2024, 3:37 PM.    I, Joseph Verdin DO,  personally performed the services described in this documentation. All medical record entries made by the scribe were at my direction and in my presence.  I have reviewed the chart and discharge instructions (if applicable) and agree that the record reflects my personal performance and is accurate and complete.  Joseph Verdin DO, 12/16/2024, 4:48 PM                  [1]   Allergies  Allergen Reactions    Amlodipine SWELLING    Dust Mites SHORTNESS OF BREATH     Itchiness and SOB    Cats Claw, Uncaria Tomentosa ITCHING    Latex ITCHING    Mold ITCHING

## 2024-12-16 NOTE — TELEPHONE ENCOUNTER
Spoke to patient, full name and date of birth verified.  Patient confirmed she received the voice message 12/14/24.   Patient already scheduled an appointment with Dr. Verdin for today at 3:30 PM.   Patient thanked us for the follow up call, no further needs at this time.

## 2025-01-08 DIAGNOSIS — I10 ESSENTIAL HYPERTENSION: ICD-10-CM

## 2025-01-08 NOTE — TELEPHONE ENCOUNTER
Refill Request:      Current Outpatient Medications   Medication Sig Dispense Refill    hydroCHLOROthiazide 25 MG Oral Tab Take 1 tablet (25 mg total) by mouth daily. 90 tablet 2     Please advise

## 2025-01-12 RX ORDER — HYDROCHLOROTHIAZIDE 25 MG/1
25 TABLET ORAL DAILY
Qty: 90 TABLET | Refills: 3 | Status: SHIPPED | OUTPATIENT
Start: 2025-01-12

## 2025-01-12 NOTE — TELEPHONE ENCOUNTER
Refill passed per [facility] protocol.    Requested Prescriptions   Pending Prescriptions Disp Refills    hydroCHLOROthiazide 25 MG Oral Tab 90 tablet 2     Sig: Take 1 tablet (25 mg total) by mouth daily.       Hypertension Medications Protocol Passed - 1/12/2025 10:46 AM        Passed - CMP or BMP in past 12 months        Passed - Last BP reading less than 140/90     BP Readings from Last 1 Encounters:   12/16/24 138/89               Passed - In person appointment or virtual visit in the past 12 mos or appointment in next 3 mos     Recent Outpatient Visits              [facility]    [facility]    [facility]    5 months ago Environmental and seasonal allergies    [facility]    Nurse Only    5 months ago Seasonal and perennial allergic rhinoconjunctivitis    [facility] Jose Lemons MD    Office Visit    5 months ago Environmental and seasonal allergies    [facility]    Nurse Only                      Passed - EGFRCR or GFRNAA > 50     GFR Evaluation  EGFRCR: 97 , resulted on 4/25/2024          Passed - Medication is active on med list                 Recent Outpatient Visits              3 weeks ago Acute pain of right knee    [facility] Joseph Gunter, DO    Office Visit    1 month ago [facility]    [facility]    5 months ago Environmental and seasonal allergies    [facility]    Nurse Only    5 months ago Seasonal and perennial allergic rhinoconjunctivitis    [facility]  [facility]    Office Visit    5 months ago Environmental and seasonal allergies    [facility], [facility] [address], [place]    Nurse Only

## 2025-01-13 ENCOUNTER — LAB ENCOUNTER (OUTPATIENT)
Dept: LAB | Age: 55
End: 2025-01-13
Attending: NURSE PRACTITIONER
Payer: COMMERCIAL

## 2025-01-13 DIAGNOSIS — E03.9 ACQUIRED HYPOTHYROIDISM: ICD-10-CM

## 2025-01-13 LAB — TSI SER-ACNC: 1.53 UIU/ML (ref 0.55–4.78)

## 2025-01-13 PROCEDURE — 84443 ASSAY THYROID STIM HORMONE: CPT

## 2025-01-13 PROCEDURE — 36415 COLL VENOUS BLD VENIPUNCTURE: CPT

## 2025-01-15 DIAGNOSIS — E03.9 ACQUIRED HYPOTHYROIDISM: ICD-10-CM

## 2025-01-17 DIAGNOSIS — E03.9 ACQUIRED HYPOTHYROIDISM: Primary | ICD-10-CM

## 2025-01-20 RX ORDER — LEVOTHYROXINE SODIUM 137 UG/1
137 TABLET ORAL DAILY
Qty: 90 TABLET | Refills: 3 | Status: SHIPPED | OUTPATIENT
Start: 2025-01-20

## 2025-01-20 NOTE — TELEPHONE ENCOUNTER
Refill Passed per Protocol.     Requested Prescriptions   Pending Prescriptions Disp Refills    LEVOTHYROXINE 137 MCG Oral Tab [Pharmacy Med Name: LEVOTHYROXINE 0.137MG (137MCG) TAB] 90 tablet 0     Sig: TAKE 1 TABLET BY MOUTH DAILY       Thyroid Medication Protocol Passed - 1/20/2025 10:44 AM        Passed - TSH in past 12 months        Passed - Last TSH value is normal     Lab Results   Component Value Date    TSH 1.529 01/13/2025                 Passed - In person appointment or virtual visit in the past 12 mos or appointment in next 3 mos     Recent Outpatient Visits              1 month ago Acute pain of right knee    [facility] Joseph Gunter, DO    Office Visit    1 month ago [facility]     [facility]    5 months ago Environmental and seasonal allergies    [facility]    Nurse Only    5 months ago Seasonal and perennial allergic rhinoconjunctivitis    [facility]    Office Visit    6 months ago Environmental and seasonal allergies    [facility]    Nurse Only                      Passed - Medication is active on med list                Recent Outpatient Visits              1 month ago Acute pain of right knee    [facility] Joseph Gunter,     Office Visit    1 month ago Acute pain of right knee    [facility] Joseph Gunter, DO    Office Visit    5 months ago Environmental and seasonal allergies    [facility]    Nurse Only    5 months ago Seasonal and perennial allergic rhinoconjunctivitis    [facility] Stack [facility]    Office Visit    6 months ago  Environmental and seasonal allergies    [facility], [address], [place]    Nurse Only

## 2025-01-20 NOTE — TELEPHONE ENCOUNTER
Refill Passed per Protocol.     Requested Prescriptions   Pending Prescriptions Disp Refills    LEVOTHYROXINE 137 MCG Oral Tab [Pharmacy Med Name: LEVOTHYROXINE 0.137MG (137MCG) TAB] 90 tablet 0     Sig: TAKE 1 TABLET BY MOUTH DAILY       Thyroid Medication Protocol Passed - 1/20/2025 10:44 AM        Passed - TSH in past 12 months        Passed - Last TSH value is normal     Lab Results   Component Value Date    TSH 1.529 01/13/2025                 Passed - In person appointment or virtual visit in the past 12 mos or appointment in next 3 mos     Recent Outpatient Visits              1 month ago Acute pain of right knee    [facility] Joseph Gunter, DO    Office Visit    1 month ago [facility] right [facility]     [facility]    5 months ago Environmental and seasonal allergies    [facility]    Nurse Only    5 months ago Seasonal and perennial allergic rhinoconjunctivitis    [facility] Stack [facility]    Office Visit    6 months ago Environmental and seasonal allergies    [facility]    Nurse Only                      Passed - Medication is active on med list                Recent Outpatient Visits              1 month ago Acute pain of right knee    [facility] Joseph Gunter,     Office Visit    1 month ago Acute pain of right knee    [facility] Joseph Gunter, DO    Office Visit    5 months ago Environmental and seasonal allergies    [facility]    Nurse Only    5 months ago Seasonal and perennial allergic rhinoconjunctivitis    [facility] St[facility]    Office Visit    6 months ago  Environmental and seasonal allergies    [facility], [address], [place]    Nurse Only

## 2025-04-14 DIAGNOSIS — M26.609 TMJ DYSFUNCTION: ICD-10-CM

## 2025-04-14 DIAGNOSIS — I10 ESSENTIAL HYPERTENSION: ICD-10-CM

## 2025-04-14 DIAGNOSIS — R68.84 JAW PAIN: ICD-10-CM

## 2025-04-15 ENCOUNTER — TELEPHONE (OUTPATIENT)
Dept: DERMATOLOGY CLINIC | Facility: CLINIC | Age: 55
End: 2025-04-15

## 2025-04-15 NOTE — TELEPHONE ENCOUNTER
Fax from [facility]    placed in pa inbox    PA required for: Tretinoin       [facility]   [phone] id [phone]

## 2025-04-16 NOTE — TELEPHONE ENCOUNTER
Please proceed with pa. Good prescription would be the option if not covered, could re-attempt a pa post a follow-up visit.

## 2025-04-17 NOTE — TELEPHONE ENCOUNTER
Sent IT'SUGAR message to patient to confirm, how Amlodipine is being taken. Last filled on 2/27/24 for a 6 month supply.     Diazepam:  Recent fills each # 15 : 05/31/2024  Last prescription written: 05/31/2024  Last office visit:  12/16/2024    Future Appointments   Date Time Provider Department Center   5/15/2025 10:15 AM Joseph Verdin DO [facility]   6/4/2025  2:20 PM Karen Yun MD [facility]

## 2025-04-21 RX ORDER — TRETINOIN 0.5 MG/G
CREAM TOPICAL
COMMUNITY
Start: 2025-01-15

## 2025-04-21 RX ORDER — AMLODIPINE BESYLATE 5 MG/1
5 TABLET ORAL DAILY
Qty: 90 TABLET | Refills: 1 | Status: SHIPPED | OUTPATIENT
Start: 2025-04-21

## 2025-04-21 RX ORDER — DIAZEPAM 5 MG/1
5 TABLET ORAL NIGHTLY PRN
Qty: 15 TABLET | Refills: 0 | Status: SHIPPED | OUTPATIENT
Start: 2025-04-21

## 2025-04-21 NOTE — TELEPHONE ENCOUNTER
Fax from [facility] - PA approved for Tretinoin from 3/22/25 - 4/21/26.  MyChart sent to pt.

## 2025-04-21 NOTE — TELEPHONE ENCOUNTER
Medication PA Requested:  tretinoin 0.05% cream                                                        CoverMyMeds Used:  Key:  Quantity: 20gm  Day Supply: 30  Si  DX Code:  L70.9                                   CPT code (if applicable):   Case Number/Pending Ref#:

## 2025-04-21 NOTE — TELEPHONE ENCOUNTER
Dear nursing staff,    Please call patient and verify if she is requesting a refill for amlodipine.    Records show that amlodipine is listed as a high allergy due to swelling. This med was discontinued at 6/13/24 office visit and patient was placed on Benicar.      Per 6/13/24 office visit with Dr. Verdin:  She is compliant with her medications. Pt has been compliant with Amlodipine 5 mg but developed intermittent lower leg edema       Stop the amlodipine as it is causing swelling of your legs. Will get a place you on Benicar 20 mg instead for blood pressure.

## 2025-04-21 NOTE — TELEPHONE ENCOUNTER
Medication PA Requested:  tretinoin 0.05% cream                                                        CoverMyMeds Used: yes  Key:[id number]  Quantity: 20gm  Day Supply: 30  Si  DX Code:  L70.9                                   CPT code (if applicable):   Case Number/Pending Ref#:    PA submitted via Cover My Meds with LOV 24  Awaiting determination

## 2025-05-20 ENCOUNTER — TELEPHONE (OUTPATIENT)
Dept: FAMILY MEDICINE CLINIC | Facility: CLINIC | Age: 55
End: 2025-05-20

## 2025-06-04 ENCOUNTER — OFFICE VISIT (OUTPATIENT)
Dept: OBGYN CLINIC | Facility: CLINIC | Age: 55
End: 2025-06-04

## 2025-06-04 VITALS
HEART RATE: 94 BPM | WEIGHT: 220 LBS | SYSTOLIC BLOOD PRESSURE: 124 MMHG | BODY MASS INDEX: 37.56 KG/M2 | HEIGHT: 64 IN | DIASTOLIC BLOOD PRESSURE: 83 MMHG

## 2025-06-04 DIAGNOSIS — Z12.31 VISIT FOR SCREENING MAMMOGRAM: ICD-10-CM

## 2025-06-04 DIAGNOSIS — Z01.419 ENCOUNTER FOR GYNECOLOGICAL EXAMINATION WITHOUT ABNORMAL FINDING: Primary | ICD-10-CM

## 2025-06-04 PROCEDURE — 99396 PREV VISIT EST AGE 40-64: CPT | Performed by: OBSTETRICS & GYNECOLOGY

## 2025-06-04 PROCEDURE — 3074F SYST BP LT 130 MM HG: CPT | Performed by: OBSTETRICS & GYNECOLOGY

## 2025-06-04 PROCEDURE — 3008F BODY MASS INDEX DOCD: CPT | Performed by: OBSTETRICS & GYNECOLOGY

## 2025-06-04 PROCEDURE — 3079F DIAST BP 80-89 MM HG: CPT | Performed by: OBSTETRICS & GYNECOLOGY

## 2025-06-04 NOTE — PROGRESS NOTES
The following individual(s) verbally consented to be recorded using ambient AI listening technology and understand that they can each withdraw their consent to this listening technology at any point by asking the clinician to turn off or pause the recording:    Patient name: Binta Millan  Additional names:

## 2025-06-06 NOTE — PROGRESS NOTES
Binta Millan is a 54 year old female  Patient's last menstrual period was 04/10/2022.   Chief Complaint   Patient presents with    Gyn Exam     ANNUAL EXAM   .  History of Present Illness      OBSTETRICS HISTORY:     OB History    Para Term  AB Living   3 2 2 0 1 2   SAB IAB Ectopic Multiple Live Births   1 0 0 0 2      # Outcome Date GA Lbr Eldon/2nd Weight Sex Type Anes PTL Lv   3 Term     M NORMAL SPONT   EL   2 Term     M NORMAL SPONT   EL   1 SAB                GYNE HISTORY:     Periods none due to hysterectomy      BCM:  Hysterectomy    History   Sexual Activity    Sexual activity: Not Currently    Partners: Male    Birth control/ protection: Hysterectomy        Hx Prior Abnormal Pap: No  Pap Date: 22  Pap Result Notes: NEG PAP / NEG HPV  Follow Up Recommendation: MAMMO BILATERAL  24 NEG          Latest Ref Rng & Units 2022     1:25 PM 10/3/2016     2:32 PM   RECENT PAP RESULTS   INTERPRETATION/RESULT: Negative for intraepithelial lesion or malignancy Negative for intraepithelial lesion or malignancy  Negative for intraepithelial lesion or malignancy    HPV Negative Negative  Negative          MEDICAL HISTORY:     Past Medical History:    Anxiety state    Essential hypertension    Helicobacter pylori gastritis    Hiatal hernia    High blood pressure    Hypothyroidism    Insomnia    Sleep apnea    no machine     Past Surgical History:   Procedure Laterality Date    Colonoscopy  2011    Colonoscopy N/A 10/9/2020    Procedure: COLONOSCOPY;  Surgeon: Sumeet Echeverria MD;  Location: [facility] ENDOSCOPY    Egd  2011    Hand/finger surgery unlisted Right     little finger osteotomy    Hemorrhoidectomy,external  10/22/2020    w/ skin tag ex    Hysterectomy  2022    TLH/BS -- essure intact     OB History    Para Term  AB Living   3 2 2 0 1 2   SAB IAB Ectopic Multiple Live Births   1 0 0 0 2        SOCIAL HISTORY:     Tobacco Use: Low  Risk  (6/4/2025)    Patient History     Smoking Tobacco Use: Never     Smokeless Tobacco Use: Never     Passive Exposure: Never       FAMILY HISTORY:     Family History   Problem Relation Age of Onset    Diabetes Father         type 1    Heart Disorder Father     Hypertension Father     Other (gout) Father     Other (arthritis) Father     Cancer Mother     Diabetes Mother     Hypertension Mother     Other (arthritis) Mother     Other (psoriasis) Son     Diabetes Brother          MEDICATIONS:       Current Outpatient Medications:     AMLODIPINE 5 MG Oral Tab, TAKE 1 TABLET(5 MG) BY MOUTH DAILY, Disp: 90 tablet, Rfl: 1    DIAZEPAM 5 MG Oral Tab, TAKE 1 TABLET(5 MG) BY MOUTH EVERY NIGHT AS NEEDED, Disp: 15 tablet, Rfl: 0    Tretinoin 0.05 % External Cream, APPLY A SMALL TO FACE AS DIRECTED AT BEDTIME, Disp: , Rfl:     levothyroxine 137 MCG Oral Tab, Take 137 mcg by mouth daily., Disp: 90 tablet, Rfl: 3    hydroCHLOROthiazide 25 MG Oral Tab, Take 1 tablet (25 mg total) by mouth daily., Disp: 90 tablet, Rfl: 3    atorvastatin 10 MG Oral Tab, Take 1 tablet (10 mg total) by mouth nightly. Pt needs appointment for repeat labs, Disp: 90 tablet, Rfl: 3    albuterol (PROAIR HFA) 108 (90 Base) MCG/ACT Inhalation Aero Soln, Inhale 2 puffs into the lungs every 4 (four) hours as needed for Wheezing., Disp: 1 each, Rfl: 0    predniSONE 20 MG Oral Tab, Take 2 by mouth at same time daily for 5 days. (Best taken at BREAKFAST or LUNCH) (Patient not taking: Reported on 6/4/2025), Disp: 10 tablet, Rfl: 0    ALLERGIES:         Allergies   Allergen Reactions    Amlodipine SWELLING    Dust Mites SHORTNESS OF BREATH     Itchiness and SOB    Cats Claw, Uncaria Tomentosa ITCHING    Latex ITCHING    Mold ITCHING         REVIEW OF SYSTEMS:     Constitutional:    denies fever / chills  Eyes:     denies blurred or double vision  Cardiovascular:  denies chest pain or palpitations  Respiratory:    denies shortness of breath  Gastrointestinal:   denies severe abdominal pain, frequent diarrhea or constipation, nausea / vomiting  Genitourinary:    denies dysuria, bothersome incontinence  Skin/Breast:   denies any breast pain, lumps, or discharge  Neurological:    denies frequent severe headaches  Psychiatric:   denies depression or anxiety, thoughts of harming self or others  Heme/Lymph:    denies easy bruising or bleeding      PHYSICAL EXAM:   Blood pressure 124/83, pulse 94, height 5' 4\" (1.626 m), weight 220 lb (99.8 kg), last menstrual period 04/10/2022, not currently breastfeeding.  Constitutional:  well developed, well nourished  Head/Face:  normocephalic  Neck/Thyroid: thyroid symmetric, no thyromegaly, no nodules, no adenopathy  Lymphatic: no abnormal supraclavicular or axillary adenopathy is noted  Breast:   normal without palpable masses, tenderness, asymmetry, nipple discharge, nipple retraction or skin changes  Abdomen:   soft, nontender, nondistended, no masses  Skin/Hair:  no unusual rashes or bruises  Extremities:  no edema, no cyanosis  Psychiatric:   oriented to time, place, person and situation. Appropriate mood and affect    Pelvic Exam:  External Genitalia:  normal appearance, hair distribution, and no lesions  Urethral Meatus:   normal in size, location, without lesions and prolapse  Bladder:    no fullness, masses or tenderness  Vagina:    normal appearance without lesions, no abnormal discharge  Cervix:    absent  Uterus:    absent  Adnexa:   normal without masses or tenderness  Perineum:   normal  Anus: no hemorroids     Results      ASSESSMENT & PLAN:     Binta was seen today for gyn exam.    Diagnoses and all orders for this visit:    Encounter for gynecological examination without abnormal finding    Visit for screening mammogram  -     Mammogram Screen 3D Digital Bilateral; Future      Assessment & Plan      SUMMARY:  Pap: No more paps per ASCCP guidelines.  BCM:  Hysterectomy  STD screening: declines  Mammogram: ordered  placed  HM updated  Depression screen:   Depression Screening (PHQ-2/PHQ-9): Over the LAST 2 WEEKS   Little interest or pleasure in doing things: Not at all    Feeling down, depressed, or hopeless: Not at all    PHQ-2 SCORE: 0          FOLLOW-UP     Return in about 1 year (around 6/4/2026) for annual gyne exam.    Note to patient and family:  The 21st Century Cures Act makes medical notes available to patients in the interest of transparency.  However, please be advised that this is a medical document.  It is intended as a peer to peer communication.  It is written in medical language and may contain abbreviations or verbiage that are technical and unfamiliar.  It may appear blunt or direct.  Medical documents are intended to carry relevant information, facts as evident, and the clinical opinion of the practitioner.

## 2025-07-08 ENCOUNTER — HOSPITAL ENCOUNTER (OUTPATIENT)
Age: 55
Discharge: HOME | End: 2025-07-08
Attending: EMERGENCY MEDICINE
Payer: COMMERCIAL

## 2025-07-08 ENCOUNTER — TELEPHONE (OUTPATIENT)
Dept: FAMILY MEDICINE CLINIC | Facility: CLINIC | Age: 55
End: 2025-07-08

## 2025-07-08 VITALS
SYSTOLIC BLOOD PRESSURE: 135 MMHG | TEMPERATURE: 98 F | HEART RATE: 82 BPM | RESPIRATION RATE: 18 BRPM | OXYGEN SATURATION: 97 % | DIASTOLIC BLOOD PRESSURE: 91 MMHG

## 2025-07-08 DIAGNOSIS — R73.9 HYPERGLYCEMIA: ICD-10-CM

## 2025-07-08 DIAGNOSIS — R25.2 MUSCLE CRAMPS: Primary | ICD-10-CM

## 2025-07-08 LAB
#MXD IC: 0.2 X10ˆ3/UL (ref 0.1–1)
BUN BLD-MCNC: 11 MG/DL (ref 7–18)
CHLORIDE BLD-SCNC: 104 MMOL/L (ref 98–112)
CO2 BLD-SCNC: 26 MMOL/L (ref 21–32)
CREAT BLD-MCNC: 0.7 MG/DL (ref 0.55–1.02)
EGFRCR SERPLBLD CKD-EPI 2021: 102 ML/MIN/1.73M2 (ref 60–?)
GLUCOSE BLD-MCNC: 169 MG/DL (ref 70–99)
HCT VFR BLD AUTO: 39.3 % (ref 35–48)
HCT VFR BLD CALC: 39 % (ref 34–50)
HGB BLD-MCNC: 12.3 G/DL (ref 12–16)
ISTAT IONIZED CALCIUM FOR CHEM 8: 1.16 MMOL/L (ref 1.12–1.32)
LYMPHOCYTES # BLD AUTO: 1.2 X10ˆ3/UL (ref 1–4)
LYMPHOCYTES NFR BLD AUTO: 22.8 %
MCH RBC QN AUTO: 31 PG (ref 26–34)
MCHC RBC AUTO-ENTMCNC: 31.3 G/DL (ref 31–37)
MCV RBC AUTO: 99 FL (ref 80–100)
MIXED CELL %: 4.1 %
NEUTROPHILS # BLD AUTO: 3.7 X10ˆ3/UL (ref 1.5–7.7)
NEUTROPHILS NFR BLD AUTO: 73.1 %
PLATELET # BLD AUTO: 255 X10ˆ3/UL (ref 150–450)
POTASSIUM BLD-SCNC: 3.8 MMOL/L (ref 3.6–5.1)
RBC # BLD AUTO: 3.97 X10ˆ6/UL (ref 3.8–5.3)
SODIUM BLD-SCNC: 139 MMOL/L (ref 136–145)
WBC # BLD AUTO: 5.1 X10ˆ3/UL (ref 4–11)

## 2025-07-08 PROCEDURE — 99213 OFFICE O/P EST LOW 20 MIN: CPT

## 2025-07-08 PROCEDURE — 85025 COMPLETE CBC W/AUTO DIFF WBC: CPT | Performed by: EMERGENCY MEDICINE

## 2025-07-08 PROCEDURE — 80047 BASIC METABLC PNL IONIZED CA: CPT

## 2025-07-08 PROCEDURE — 36415 COLL VENOUS BLD VENIPUNCTURE: CPT

## 2025-07-08 NOTE — ED INITIAL ASSESSMENT (HPI)
Has had foot cramps for 1 year , finger cramps for at least 1 month, hand cramps for the past week, no cp, no sob, concerned that the lipitor she's on is giving her muscle cramps

## 2025-07-08 NOTE — ED PROVIDER NOTES
Patient Seen in: [facility]        History  Chief Complaint   Patient presents with    Cramps     Stated Complaint: foot cramps    Subjective:   HPI    The patient is a 55-year-old female with past history of hypertension, hyperlipidemia who presents now with cramping of the hands and feet.  Patient initially developed foot cramps approximately 1 year ago.  Over the last few weeks, the patient has developed some cramping in her hands.  The patient was talking to a friend who stated that this might be related to her cholesterol medication.  Patient denies any chest pain or shortness of breath.  Patient denies any fever.  The patient denies any trauma.      Objective:     Past Medical History:    Anxiety state    Essential hypertension    Helicobacter pylori gastritis    Hiatal hernia    High blood pressure    Hyperlipidemia    Hypothyroidism    Insomnia    Sleep apnea    no machine              Past Surgical History:   Procedure Laterality Date    Colonoscopy  01/20/2011    Colonoscopy N/A 10/9/2020    Procedure: COLONOSCOPY;  Surgeon: Sumeet Echeverria MD;  Location: [facility] ENDOSCOPY    Egd  01/20/2011    Hand/finger surgery unlisted Right 1990    little finger osteotomy    Hemorrhoidectomy,external  10/22/2020    w/ skin tag ex    Hysterectomy  04/18/2022    TLH/BS -- essure intact                Social History     Socioeconomic History    Marital status: Single    Number of children: 2   Occupational History    Occupation: realtor   Tobacco Use    Smoking status: Never     Passive exposure: Never    Smokeless tobacco: Never   Substance and Sexual Activity    Alcohol use: Yes     Alcohol/week: 2.0 standard drinks of alcohol     Types: 2 Glasses of wine per week     Comment: SOCIAL    Drug use: No    Sexual activity: Not Currently     Partners: Male     Birth control/protection: Hysterectomy   Other Topics Concern    Caffeine Concern Yes     Comment: coffee 2 cups    Pt has a pacemaker No    Pt has a  defibrillator No    Reaction to local anesthetic No              Review of Systems    Positive for stated complaint: foot cramps  Other systems are as noted in HPI.  Constitutional and vital signs reviewed.      All other systems reviewed and negative except as noted above.                  Physical Exam    ED Triage Vitals [07/08/25 1829]   BP (!) 135/91   Pulse 82   Resp 18   Temp 98.1 °F (36.7 °C)   Temp src Oral   SpO2 97 %   O2 Device None (Room air)       Current Vitals:   Vital Signs  BP: (!) 135/91  Pulse: 82  Resp: 18  Temp: 98.1 °F (36.7 °C)  Temp src: Oral    Oxygen Therapy  SpO2: 97 %  O2 Device: None (Room air)            Physical Exam    Constitutional: Well-developed well-nourished in no acute distress  Head: Normocephalic, no swelling or tenderness  Eyes: Nonicteric sclera, no conjunctival injection  ENT: TMs are clear and flat bilaterally.  There is no posterior pharyngeal erythema  Chest: Clear to auscultation, no tenderness  Cardiovascular: Regular rate and rhythm without murmur  Abdomen: Soft, nontender and nondistended  Neurologic: Patient is awake, alert and oriented ×3.  The patient's motor strength is 5 out of 5 and symmetric in the upper and lower extremities bilaterally  Extremities: No focal swelling or tenderness  Skin: No pallor, no redness or warmth to the touch          ED Course  Labs Reviewed   POCT ISTAT CHEM8 CARTRIDGE - Abnormal; Notable for the following components:       Result Value    ISTAT Glucose 169 (*)     All other components within normal limits   POCT CBC          Patient's lab results were discussed with her.  Patient's electrolytes were normal other than glucose of 169.  Possibility of patient's muscular cramping being related to her statin were reviewed with the patient.  Patient's elevated glucose was also reviewed.  The patient states she will call her primary MD tomorrow for follow-up and has annual valuation with primary MD this month.                  RONALD      Metabolic abnormality versus arthritis versus medication reaction        Medical Decision Making      Disposition and Plan     Clinical Impression:  1. Muscle cramps    2. Hyperglycemia         Disposition:  There is no disposition on file for this visit.  There is no disposition time on file for this visit.    Follow-up:  Joseph Verdin DO  [address] [place] [id number]  [phone]      Call tomorrow to discuss possible medication changes and elevated blood sugar          Medications Prescribed:  Current Discharge Medication List                Supplementary Documentation:

## 2025-07-09 ENCOUNTER — TELEPHONE (OUTPATIENT)
Dept: FAMILY MEDICINE CLINIC | Facility: CLINIC | Age: 55
End: 2025-07-09

## 2025-07-09 DIAGNOSIS — E78.2 MIXED HYPERLIPIDEMIA: Primary | ICD-10-CM

## 2025-07-09 RX ORDER — ROSUVASTATIN CALCIUM 5 MG/1
5 TABLET, COATED ORAL NIGHTLY
Qty: 90 TABLET | Refills: 1 | Status: SHIPPED | OUTPATIENT
Start: 2025-07-09

## 2025-07-09 NOTE — TELEPHONE ENCOUNTER
Please let her know that she needs to be on a cholesterol medication to decrease chances of heart attacks and strokes.  Lets stop the atorvastatin and try a drug called Crestor 5 mg which is the lowest dose.  I am not seeing many people have any aches and pains because of this medication.  I will then see her at the end of this month.

## 2025-07-09 NOTE — TELEPHONE ENCOUNTER
Patient called, verified Name and . Asking to switched her cholesterol medicine, on atorvastatin. States she has been having muscle pains that started one year ago. She initially thought it was from wearing flip flops when going up and down the stairs so she ignored it. However, lately the pain is even in her hands. Pain comes and goes.    States she already knew that one of the side effects of atorvastatin is muscle pain as some of her friends had the same issue. She ended up going to the [facility] yesterday because of the muscle cramps. Labs were drawn and was told that it was normal. She was advised to stop taking the atorvastatin and follow up with PCP.     She is unsure if she already brought this up with PCP before. States she is due for physical on . Patient asking if she can get a different cholesterol medication, or other recommendation.    Future Appointments   Date Time Provider Department Center   2025  2:45 PM Joseph Verdin DO [facility]     Dr. Verdin please advise.

## 2025-07-10 NOTE — TELEPHONE ENCOUNTER
You are receiving this message because you were on call 25, please advise what advice was given to the patient. Please sign the encounter after you document if there is no further action needed.     Message # [phone]         2025 05:29p   [PIOTR]  To:  From:  JANNETTE Yepez MD:  Phone#:  ----------------------------------------------------------------------  NEVILLE JAMES [phone]  [address] PT OF DR BARRERA HAVING SEVER MUSCLE PAIN SINCE GOING ON NEW MEDICATION.  Paged at number :  PAGE:  [phone] at :  17:29          (Message Delivered)   D E L I V E R I E S :  2025 05:29p           PIOTR Stevens

## 2025-07-16 ENCOUNTER — HOSPITAL ENCOUNTER (OUTPATIENT)
Dept: MAMMOGRAPHY | Age: 55
Discharge: HOME | End: 2025-07-16
Attending: OBSTETRICS & GYNECOLOGY
Payer: COMMERCIAL

## 2025-07-16 DIAGNOSIS — Z12.31 VISIT FOR SCREENING MAMMOGRAM: ICD-10-CM

## 2025-07-16 PROCEDURE — 77063 BREAST TOMOSYNTHESIS BI: CPT | Performed by: OBSTETRICS & GYNECOLOGY

## 2025-07-16 PROCEDURE — 77067 SCR MAMMO BI INCL CAD: CPT | Performed by: OBSTETRICS & GYNECOLOGY

## 2025-07-17 ENCOUNTER — NURSE TRIAGE (OUTPATIENT)
Dept: FAMILY MEDICINE CLINIC | Facility: CLINIC | Age: 55
End: 2025-07-17

## 2025-07-17 ENCOUNTER — PATIENT MESSAGE (OUTPATIENT)
Dept: FAMILY MEDICINE CLINIC | Facility: CLINIC | Age: 55
End: 2025-07-17

## 2025-07-17 NOTE — TELEPHONE ENCOUNTER
Spoke with patient,  verified.   Gave her Dr Nelson's message.    Patient started taking Celebrex 200 mg from an old script and it is helping.  She is asking Dr Nelson if she can take an extra Celebrex 200 mg?  Dr Nelson, please advise?        Future Appointments   Date Time Provider Department Center   2025 11:15 AM Joseph Verdin DO [facility]   2025  2:45 PM Joseph Verdin DO [facility]  [facility]

## 2025-07-17 NOTE — TELEPHONE ENCOUNTER
Action Requested: Summary for Provider     []  Critical Lab, Recommendations Needed  [x] Need Additional Advice  []   FYI    []   Need Orders  [] Need Medications Sent to Pharmacy  []  Other     SUMMARY: Patient stated that for a few weeks has been having her right wrist swell up and has pain. Left inner ankle started swelling first and has pain, with tops of both feet swelling equally as well. Initially thought it might be her cholesterol medication or possible sprained her ankle, but when started having pain and swelling in the wrist feels like something else is going on. Has been taking aleve for 2.5 weeks and early this morning took celebrex 200mg that she had left over from before, since she could not sleep because of the pain. The anti-inflammatories do help. Went to urgent care on 7/8/2025 for her muscle pain, but did not have anything with the wrist then. No other symptoms. Wanted to see Dr Verdin only or see what doctor recommends. Please advise.  Appointment made for 7/21/2025 at 11:15am with Dr Verdin in [place]. Also has her physical on 7/31/2025. Wasn't sure if it could wait to her physical on 7/31/2025.    In the meantime, patient wanted to know if she could take another celebrex now and how often she could take it?    Dr Verdin not in the office today.   Reason for call: Wrist Pain (Right wrist pain/swelling, left ankle pain/swelling)  Onset: Data Unavailable    Binta Millan to P Em Rn Triage (supporting Joseph Verdin DO)   KJ      7/17/25  2:19 AM  Dr. Verdin,  my hands hurt (since a couple of weeks ago) and my left pinky and right wrist are swollen and my left ankle is swollen.  I thought I twisted my ankle 4-5 weeks ago, but am questioning if I did or it was swollen and I am hurting it. When I was taking Aleve for my ankle all the swelling and pain went away but when I stopped taking Aleve all the swelling came back but now in both hands.  The pain/inflammation are so bad that it is hard  to use my hands and now moving my right wrist. Something is really wrong. I assumed this was from the cholesterol medicine but now am on a different medicine and is not going away. Please advise.  Reason for Disposition   MODERATE pain (e.g., interferes with normal activities) and present > 3 days   Swollen joint with no fever or redness    Protocols used: Hand and Wrist Pain-A-OH, Ankle Pain-A-OH

## 2025-07-18 RX ORDER — TRETINOIN 0.5 MG/G
CREAM TOPICAL
Qty: 20 G | Refills: 0 | Status: SHIPPED | OUTPATIENT
Start: 2025-07-18

## 2025-07-18 NOTE — TELEPHONE ENCOUNTER
Refill Request for medication(s):     Last Office Visit: 01/19/2024    Last Refill: 01/15/25    Pharmacy, Dosage verified: yes    Condition Update (if applicable):     Rx pended and sent to provider for approval, please advise. Thank You!

## 2025-07-21 ENCOUNTER — OFFICE VISIT (OUTPATIENT)
Dept: FAMILY MEDICINE CLINIC | Facility: CLINIC | Age: 55
End: 2025-07-21

## 2025-07-21 ENCOUNTER — LAB ENCOUNTER (OUTPATIENT)
Dept: LAB | Age: 55
End: 2025-07-21
Attending: FAMILY MEDICINE
Payer: COMMERCIAL

## 2025-07-21 VITALS
TEMPERATURE: 98 F | WEIGHT: 220 LBS | BODY MASS INDEX: 37.56 KG/M2 | DIASTOLIC BLOOD PRESSURE: 100 MMHG | SYSTOLIC BLOOD PRESSURE: 152 MMHG | HEART RATE: 84 BPM | HEIGHT: 64 IN

## 2025-07-21 DIAGNOSIS — I10 ESSENTIAL HYPERTENSION: ICD-10-CM

## 2025-07-21 DIAGNOSIS — M25.472 LEFT ANKLE SWELLING: Primary | ICD-10-CM

## 2025-07-21 DIAGNOSIS — R76.8 POSITIVE ANA (ANTINUCLEAR ANTIBODY): ICD-10-CM

## 2025-07-21 DIAGNOSIS — M25.431 SWELLING OF RIGHT WRIST: Chronic | ICD-10-CM

## 2025-07-21 DIAGNOSIS — R73.9 HYPERGLYCEMIA: Chronic | ICD-10-CM

## 2025-07-21 DIAGNOSIS — R73.9 HYPERGLYCEMIA: ICD-10-CM

## 2025-07-21 DIAGNOSIS — E03.9 ACQUIRED HYPOTHYROIDISM: ICD-10-CM

## 2025-07-21 DIAGNOSIS — M79.645 PAIN OF FINGER OF LEFT HAND: ICD-10-CM

## 2025-07-21 DIAGNOSIS — M25.531 RIGHT WRIST PAIN: ICD-10-CM

## 2025-07-21 DIAGNOSIS — M79.645 PAIN OF FINGER OF LEFT HAND: Chronic | ICD-10-CM

## 2025-07-21 DIAGNOSIS — R76.8 POSITIVE ANA (ANTINUCLEAR ANTIBODY): Chronic | ICD-10-CM

## 2025-07-21 DIAGNOSIS — M25.472 LEFT ANKLE SWELLING: ICD-10-CM

## 2025-07-21 DIAGNOSIS — M25.431 SWELLING OF RIGHT WRIST: ICD-10-CM

## 2025-07-21 LAB
CRP SERPL-MCNC: <0.5 MG/DL (ref ?–0.5)
ERYTHROCYTE [SEDIMENTATION RATE] IN BLOOD: 39 MM/HR (ref 0–30)
EST. AVERAGE GLUCOSE BLD GHB EST-MCNC: 128 MG/DL (ref 68–126)
HBA1C MFR BLD: 6.1 % (ref ?–5.7)
RHEUMATOID FACT SERPL-ACNC: 615.8 IU/ML (ref ?–14)

## 2025-07-21 PROCEDURE — 99215 OFFICE O/P EST HI 40 MIN: CPT | Performed by: FAMILY MEDICINE

## 2025-07-21 PROCEDURE — 86038 ANTINUCLEAR ANTIBODIES: CPT

## 2025-07-21 PROCEDURE — 86140 C-REACTIVE PROTEIN: CPT

## 2025-07-21 PROCEDURE — 85652 RBC SED RATE AUTOMATED: CPT

## 2025-07-21 PROCEDURE — 83036 HEMOGLOBIN GLYCOSYLATED A1C: CPT

## 2025-07-21 PROCEDURE — 86431 RHEUMATOID FACTOR QUANT: CPT

## 2025-07-21 PROCEDURE — G2211 COMPLEX E/M VISIT ADD ON: HCPCS | Performed by: FAMILY MEDICINE

## 2025-07-21 PROCEDURE — 3077F SYST BP >= 140 MM HG: CPT | Performed by: FAMILY MEDICINE

## 2025-07-21 PROCEDURE — 86225 DNA ANTIBODY NATIVE: CPT

## 2025-07-21 PROCEDURE — 3080F DIAST BP >= 90 MM HG: CPT | Performed by: FAMILY MEDICINE

## 2025-07-21 PROCEDURE — 36415 COLL VENOUS BLD VENIPUNCTURE: CPT

## 2025-07-21 PROCEDURE — 3008F BODY MASS INDEX DOCD: CPT | Performed by: FAMILY MEDICINE

## 2025-07-21 NOTE — PROGRESS NOTES
Patient ID: Binta Millan is a 55 year old female.         The following individual(s) verbally consented to be recorded using ambient AI listening technology and understand that they can each withdraw their consent to this listening technology at any point by asking the clinician to turn off or pause the recording:    Patient name: Binta Millan  Additional names:           HPI  Chief Complaint   Patient presents with    Wrist Pain     Rt. Wrist pain/ swelling x 1 week     Ankle Pain     Lt.  inner ankle pain/ swelling x 6 weeks       History of Present Illness  Binta Millan is a 55 year old female with history of positive rheumatoid arthritis factor who presents with joint pain and swelling.    She has been experiencing new onset pain and swelling in her left pinky finger for about a month, with persistent swelling in the PIP joint and stiffness. There is also intermittent swelling in the right thumb.    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 She initially thought she had twisted it again when she fell into stairs but now questions if the swelling was pre-existing. She has been wearing more supportive shoes due to difficulty walking and has taken Aleve for two and a half weeks, which helped until she stopped taking it, after which the aches and pains returned. Swelling and pain in the left inner ankle are more pronounced than in the right.  Once she gets the inner part of her ankle she was still able to bear weight.  She never did go to the hospital for an x-ray.    She has experienced intermittent pain at the top of both feet for about a year, initially attributing it to muscle strain from wearing sandals while helping someone recover from surgery. The pain is described as coming and going, sometimes being very painful.    New swelling and pain in her hands started about two weeks ago, making it  difficult to clean or hold things. The right wrist began swelling about a week ago, and it is hard to move. She has not taken anti-inflammatories recently, as the pain and swelling would return after stopping them. Aleve seemed to help more than Celebrex.  She states that the swelling in the hands is much better and currently the right wrist swelling has resolved.    She recalls a previous episode of severe pain in her hands and toes after receiving a COVID shot, which lasted two and a half months but eventually resolved. She had an x-ray and other tests done at that time, but the pain subsided.    Wt Readings from Last 6 Encounters:   07/21/25 220 lb (99.8 kg)   06/04/25 220 lb (99.8 kg)   12/16/24 216 lb (98 kg)   12/02/24 217 lb (98.4 kg)   08/13/24 214 lb (97.1 kg)   06/13/24 207 lb (93.9 kg)       BMI Readings from Last 6 Encounters:   07/21/25 37.76 kg/m²   06/04/25 37.76 kg/m²   12/16/24 38.26 kg/m²   12/02/24 38.44 kg/m²   08/13/24 37.91 kg/m²   06/13/24 35.53 kg/m²       BP Readings from Last 6 Encounters:   07/21/25 (!) 155/97   07/08/25 (!) 135/91   06/04/25 124/83   12/16/24 138/89   12/02/24 150/90   10/10/24 129/85       Results  LABS    Rheumatoid factor: 419 (03/01/2022)    CARIN: Positive, 1:160 (03/04/2022)    Review of Systems  No exertional cardiac chest pain or shortness of breath unless stated in HPI which would take precedence.  See HPI for further review of systems.        Medical History:      Past Medical History:    Anxiety state    Essential hypertension    Helicobacter pylori gastritis    Hiatal hernia    High blood pressure    Hyperlipidemia    Hypothyroidism    Insomnia    Sleep apnea    no machine       Past Surgical History:   Procedure Laterality Date    Colonoscopy  01/20/2011    Colonoscopy N/A 10/9/2020    Procedure: COLONOSCOPY;  Surgeon: Sumeet Echeverria MD;  Location: [facility] ENDOSCOPY    Egd  01/20/2011    Hand/finger surgery unlisted Right 1990    little finger osteotomy     Hemorrhoidectomy,external  10/22/2020    w/ skin tag ex    Hysterectomy  04/18/2022    TLH/BS -- essure intact          Current Outpatient Medications   Medication Sig Dispense Refill    Tretinoin 0.05 % External Cream APPLY A SMALL TO FACE AS DIRECTED AT BEDTIME 20 g 0    rosuvastatin 5 MG Oral Tab Take 1 tablet (5 mg total) by mouth nightly. for elevated cholesterol. 90 tablet 1    AMLODIPINE 5 MG Oral Tab TAKE 1 TABLET(5 MG) BY MOUTH DAILY 90 tablet 1    DIAZEPAM 5 MG Oral Tab TAKE 1 TABLET(5 MG) BY MOUTH EVERY NIGHT AS NEEDED 15 tablet 0    levothyroxine 137 MCG Oral Tab Take 137 mcg by mouth daily. 90 tablet 3    hydroCHLOROthiazide 25 MG Oral Tab Take 1 tablet (25 mg total) by mouth daily. 90 tablet 3    albuterol (PROAIR HFA) 108 (90 Base) MCG/ACT Inhalation Aero Soln Inhale 2 puffs into the lungs every 4 (four) hours as needed for Wheezing. 1 each 0     Allergies:  Allergies   Allergen Reactions    Amlodipine SWELLING    Dust Mites SHORTNESS OF BREATH     Itchiness and SOB    Cats Claw, Uncaria Tomentosa ITCHING    Latex ITCHING    Mold ITCHING        Physical Exam:       Physical Exam  Blood pressure (!) 155/97, pulse 84, temperature 97.6 °F (36.4 °C), temperature source Temporal, height 5' 4\" (1.626 m), weight 220 lb (99.8 kg), last menstrual period 04/10/2022, not currently breastfeeding.  Vitals:    07/21/25 1117 07/21/25 1135   BP: (!) 155/97 (!) 152/100   Pulse: 84    Temp: 97.6 °F (36.4 °C)    TempSrc: Temporal    Weight: 220 lb (99.8 kg)    Height: 5' 4\" (1.626 m)      Right wrist: No tenderness and full range of motion.  There is no swelling at this time.  She pointed to the ulnar aspect of the dorsum of the wrist as the site of pain but at this time no abnormality.  Full range of motion of her fingers and thumb on the right side.  No hand swelling.  Neurovascularly intact.    Left hand: No swelling of the hand or wrist except the left fifth PIP joint which is minimally swollen.  Not red or hot.  It  has no ligament laxity and still has good flexion and extension.  No wrist tenderness or pain.    Both feet with 2+ pulses.  There is some mild pitting edema around the left medial malleolus but no tenderness over the medial or lateral malleolus or tenderness over the ligaments themselves.  Negative anterior drawer test.  Minimally tender over the dorsum of the feet but nothing localized bilaterally.    Right ankle without any tenderness or ligament laxity.  Negative anterior drawer test.  No tenderness over the medial or lateral malleolus.  Gait is quite normal.         Physical Exam  VITALS: BP- 152/100  EXTREMITIES: Good pulses in bilateral feet. Edema around left medial malleolus. No edema in right foot.  MUSCULOSKELETAL: Negative anterior drawer test in right ankle. Good range of motion in right wrist.      Assessment/Plan:        Diagnoses and all orders for this visit:    Left ankle swelling  -     Connective Tissue Disease (CARIN) Screen [E]; Future  -     Rheumatoid Arthritis Factor; Future  -     C-Reactive Protein [E]; Future  -     Sed Rate, [employer] (Automated); Future  -     RHEUMATOLOGY - INTERNAL  She needs to see rheumatology.  She has not seen a rheumatologist in quite some time.  Can continue taking the Aleve as this seems to help.  I reviewed the past x-rays that she has had.  Swelling of right wrist  -     Connective Tissue Disease (CARIN) Screen [E]; Future  -     Rheumatoid Arthritis Factor; Future  -     C-Reactive Protein [E]; Future  -     Sed Rate, Westergren (Automated); Future  -     RHEUMATOLOGY - INTERNAL  No swelling at this time.  Pain of finger of left hand  -     Connective Tissue Disease (CARIN) Screen [E]; Future  -     Rheumatoid Arthritis Factor; Future  -     C-Reactive Protein [E]; Future  -     Sed Rate, Westergren (Automated); Future  -     RHEUMATOLOGY - INTERNAL    Positive CARIN (antinuclear antibody)  -     Connective Tissue Disease (CARIN) Screen [E]; Future  -     Rheumatoid  Arthritis Factor; Future  -     C-Reactive Protein [E]; Future  -     Sed Rate, Westergren (Automated); Future  -     RHEUMATOLOGY - INTERNAL    Right wrist pain  -     Connective Tissue Disease (CARIN) Screen [E]; Future  -     Rheumatoid Arthritis Factor; Future  -     C-Reactive Protein [E]; Future  -     Sed Rate, Westergren (Automated); Future  -     RHEUMATOLOGY - INTERNAL    Essential hypertension  She is a bit anxious today over this news and very concerned about the swelling and aches.  Continue medication for now.  Acquired hypothyroidism  Compliant with thyroid medication  Hyperglycemia  -     Hemoglobin A1C; Future  Recheck hemoglobin A1c      Referrals (if applicable)  Orders Placed This Encounter   Procedures    RHEUMATOLOGY - INTERNAL     Can see any of the providers.     Referral Priority:   Routine     Referral Type:   OFFICE VISIT     Referred to Provider:   Irene Latham DO     Requested Specialty:   RHEUMATOLOGY     Number of Visits Requested:   3         Follow up if symptoms persist.  Take medicine (if given) as prescribed.  Approach to treatment discussed and patient/family member understands and agrees to plan.     No follow-ups on file.      Assessment & Plan  Rheumatoid Arthritis  Karla presents with swelling and pain in multiple joints, including the left pinky finger, right wrist, and both feet, with symptoms worsening over the past few weeks. Positive antinuclear antibody and rheumatoid arthritis factor suggest a rheumatologic condition. Rheumatoid arthritis is suspected given the joint involvement and previous positive tests. Aleve provides more relief than Celebrex, although she has not taken anti-inflammatories recently. Early intervention is crucial to prevent progression and potential joint damage, affecting her quality of life.  - Order antinuclear antibody and rheumatoid arthritis factor tests  - Refer to rheumatologist for further evaluation and management  - Advise on early  intervention to prevent disease progression    Hypertension  Blood pressure measured at 152/100, attributed to stress and rushing to the appointment.    Joseph Verdin,   7/21/2025

## 2025-07-22 LAB
DSDNA IGG SERPL IA-ACNC: 1.8 IU/ML (ref ?–10)
ENA AB SER QL IA: 0.4 UG/L (ref ?–0.7)
ENA AB SER QL IA: NEGATIVE

## 2025-07-23 ENCOUNTER — TELEPHONE (OUTPATIENT)
Dept: FAMILY MEDICINE CLINIC | Facility: CLINIC | Age: 55
End: 2025-07-23

## 2025-07-23 DIAGNOSIS — R76.8 RHEUMATOID FACTOR POSITIVE: Primary | ICD-10-CM

## 2025-07-23 NOTE — TELEPHONE ENCOUNTER
Patient called, verified Name and . States she was seen in the office and was recommended to see a specialist but does not have the name. Chart reviewed. Rheumatology referral contact information provided. Patient verbalized understanding and had no further questions at this time.

## 2025-07-25 RX ORDER — PREDNISONE 20 MG/1
TABLET ORAL
Qty: 10 TABLET | Refills: 0 | Status: SHIPPED | OUTPATIENT
Start: 2025-07-25

## 2025-07-25 NOTE — TELEPHONE ENCOUNTER
Patient calling, she is unable to secure visit with Rhumatology until 9/2/25  Symptoms in hands are worsening, swelling.  Taking Aleve one tab every 12 hours, asking if there is something else that she can be taking to help with this.     Advised I would send message    She is going to assure she is on wait list with Rheumatology, will try checking [employer] scheduling to see if something sooner opens and may call plan to see who else is in network if she can get sooner with someone else.     Anything we can do in the meanwhile to help with hand pain and swelling she is having?   Has had RA symptoms for 1.5 years or more but recent flare with hands and looking for some more immediate relief until she can be seen by Rheumatology.       Please advise.

## 2025-07-25 NOTE — TELEPHONE ENCOUNTER
Hold off on any anti-inflammatories and I will send in a burst of prednisone to see if that helps with her discomfort.  2 tablets at the same time daily for 5 days with food.

## 2025-07-25 NOTE — TELEPHONE ENCOUNTER
Receipt confirmed by pharmacy (7/25/2025 12:30 PM CDT)     I left detailed message on verbal release verified and identified voicemail # [phone] and made aware of Dr. Verdin's recommendations, as well as Rx sent; also making aware of [employer] message being sent with interpretation and recommendations.

## 2025-07-31 ENCOUNTER — OFFICE VISIT (OUTPATIENT)
Dept: FAMILY MEDICINE CLINIC | Facility: CLINIC | Age: 55
End: 2025-07-31

## 2025-07-31 VITALS
HEIGHT: 64 IN | BODY MASS INDEX: 38.86 KG/M2 | DIASTOLIC BLOOD PRESSURE: 86 MMHG | WEIGHT: 227.63 LBS | HEART RATE: 91 BPM | TEMPERATURE: 99 F | SYSTOLIC BLOOD PRESSURE: 136 MMHG

## 2025-07-31 DIAGNOSIS — R73.9 HYPERGLYCEMIA: ICD-10-CM

## 2025-07-31 DIAGNOSIS — E66.01 SEVERE OBESITY (BMI 35.0-35.9 WITH COMORBIDITY) (HCC): ICD-10-CM

## 2025-07-31 DIAGNOSIS — R76.8 RHEUMATOID FACTOR POSITIVE: ICD-10-CM

## 2025-07-31 DIAGNOSIS — E03.9 ACQUIRED HYPOTHYROIDISM: ICD-10-CM

## 2025-07-31 DIAGNOSIS — E55.9 VITAMIN D DEFICIENCY: ICD-10-CM

## 2025-07-31 DIAGNOSIS — E78.2 MIXED HYPERLIPIDEMIA: ICD-10-CM

## 2025-07-31 DIAGNOSIS — F41.9 ANXIETY: ICD-10-CM

## 2025-07-31 DIAGNOSIS — Z00.00 ADULT GENERAL MEDICAL EXAM: Primary | ICD-10-CM

## 2025-07-31 DIAGNOSIS — I10 ESSENTIAL HYPERTENSION: ICD-10-CM

## 2025-08-07 DIAGNOSIS — M26.609 TMJ DYSFUNCTION: ICD-10-CM

## 2025-08-07 DIAGNOSIS — R68.84 JAW PAIN: ICD-10-CM

## 2025-08-12 RX ORDER — DIAZEPAM 5 MG/1
5 TABLET ORAL NIGHTLY PRN
Qty: 15 TABLET | Refills: 0 | Status: SHIPPED | OUTPATIENT
Start: 2025-08-12

## 2025-08-18 ENCOUNTER — OFFICE VISIT (OUTPATIENT)
Age: 55
End: 2025-08-18

## 2025-08-18 VITALS
DIASTOLIC BLOOD PRESSURE: 100 MMHG | BODY MASS INDEX: 37.9 KG/M2 | SYSTOLIC BLOOD PRESSURE: 143 MMHG | HEIGHT: 64 IN | WEIGHT: 222 LBS | HEART RATE: 84 BPM

## 2025-08-18 DIAGNOSIS — M25.50 POLYARTHRALGIA: ICD-10-CM

## 2025-08-18 DIAGNOSIS — M05.9 SEROPOSITIVE RHEUMATOID ARTHRITIS (HCC): Primary | ICD-10-CM

## 2025-08-18 PROCEDURE — 3077F SYST BP >= 140 MM HG: CPT | Performed by: INTERNAL MEDICINE

## 2025-08-18 PROCEDURE — 3080F DIAST BP >= 90 MM HG: CPT | Performed by: INTERNAL MEDICINE

## 2025-08-18 PROCEDURE — 3008F BODY MASS INDEX DOCD: CPT | Performed by: INTERNAL MEDICINE

## 2025-08-18 PROCEDURE — 99204 OFFICE O/P NEW MOD 45 MIN: CPT | Performed by: INTERNAL MEDICINE

## 2025-08-18 RX ORDER — HYDROXYCHLOROQUINE SULFATE 200 MG/1
400 TABLET, FILM COATED ORAL DAILY
Qty: 180 TABLET | Refills: 0 | Status: SHIPPED | OUTPATIENT
Start: 2025-08-18

## 2025-08-18 RX ORDER — PREDNISONE 10 MG/1
TABLET ORAL
Qty: 90 TABLET | Refills: 0 | Status: SHIPPED | OUTPATIENT
Start: 2025-08-18

## 2025-08-21 ENCOUNTER — LAB ENCOUNTER (OUTPATIENT)
Dept: LAB | Age: 55
End: 2025-08-21
Attending: FAMILY MEDICINE

## 2025-08-21 ENCOUNTER — PATIENT MESSAGE (OUTPATIENT)
Age: 55
End: 2025-08-21

## 2025-08-21 DIAGNOSIS — E55.9 VITAMIN D DEFICIENCY: ICD-10-CM

## 2025-08-21 DIAGNOSIS — Z00.00 ADULT GENERAL MEDICAL EXAM: ICD-10-CM

## 2025-08-21 DIAGNOSIS — R79.89 LFT ELEVATION: Primary | ICD-10-CM

## 2025-08-21 DIAGNOSIS — R76.8 RHEUMATOID FACTOR POSITIVE: ICD-10-CM

## 2025-08-21 DIAGNOSIS — M05.9 SEROPOSITIVE RHEUMATOID ARTHRITIS (HCC): ICD-10-CM

## 2025-08-21 LAB
ALBUMIN SERPL-MCNC: 4.8 G/DL (ref 3.2–4.8)
ALBUMIN/GLOB SERPL: 1.7 (ref 1–2)
ALP LIVER SERPL-CCNC: 143 U/L (ref 41–108)
ALT SERPL-CCNC: 100 U/L (ref 10–49)
ANION GAP SERPL CALC-SCNC: 9 MMOL/L (ref 0–18)
AST SERPL-CCNC: 64 U/L (ref ?–34)
BASOPHILS # BLD AUTO: 0.02 X10(3) UL (ref 0–0.2)
BASOPHILS NFR BLD AUTO: 0.6 %
BILIRUB SERPL-MCNC: 0.9 MG/DL (ref 0.3–1.2)
BUN BLD-MCNC: 15 MG/DL (ref 9–23)
BUN/CREAT SERPL: 17 (ref 10–20)
CALCIUM BLD-MCNC: 9.5 MG/DL (ref 8.7–10.4)
CHLORIDE SERPL-SCNC: 103 MMOL/L (ref 98–112)
CHOLEST SERPL-MCNC: 195 MG/DL (ref ?–200)
CO2 SERPL-SCNC: 26 MMOL/L (ref 21–32)
CREAT BLD-MCNC: 0.88 MG/DL (ref 0.55–1.02)
CRP SERPL-MCNC: <0.5 MG/DL (ref ?–0.5)
DEPRECATED RDW RBC AUTO: 46.4 FL (ref 35.1–46.3)
EGFRCR SERPLBLD CKD-EPI 2021: 78 ML/MIN/1.73M2 (ref 60–?)
EOSINOPHIL # BLD AUTO: 0.16 X10(3) UL (ref 0–0.7)
EOSINOPHIL NFR BLD AUTO: 4.4 %
ERYTHROCYTE [DISTWIDTH] IN BLOOD BY AUTOMATED COUNT: 13.3 % (ref 11–15)
ERYTHROCYTE [SEDIMENTATION RATE] IN BLOOD: 30 MM/HR (ref 0–30)
FASTING PATIENT LIPID ANSWER: YES
FASTING STATUS PATIENT QL REPORTED: YES
GLOBULIN PLAS-MCNC: 2.9 G/DL (ref 2–3.5)
GLUCOSE BLD-MCNC: 128 MG/DL (ref 70–99)
HBV CORE AB SERPL QL IA: NONREACTIVE
HBV SURFACE AB SER QL: NONREACTIVE
HBV SURFACE AB SERPL IA-ACNC: <3.1 MIU/ML
HBV SURFACE AG SER-ACNC: <0.1
HBV SURFACE AG SERPL QL IA: NONREACTIVE
HCT VFR BLD AUTO: 37.1 % (ref 35–48)
HCV AB SERPL QL IA: NONREACTIVE
HDLC SERPL-MCNC: 72 MG/DL (ref 40–59)
HGB BLD-MCNC: 13.1 G/DL (ref 12–16)
IMM GRANULOCYTES # BLD AUTO: 0.01 X10(3) UL (ref 0–1)
IMM GRANULOCYTES NFR BLD: 0.3 %
LDLC SERPL CALC-MCNC: 104 MG/DL (ref ?–100)
LYMPHOCYTES # BLD AUTO: 0.96 X10(3) UL (ref 1–4)
LYMPHOCYTES NFR BLD AUTO: 26.6 %
MCH RBC QN AUTO: 34.6 PG (ref 26–34)
MCHC RBC AUTO-ENTMCNC: 35.3 G/DL (ref 31–37)
MCV RBC AUTO: 97.9 FL (ref 80–100)
MONOCYTES # BLD AUTO: 0.34 X10(3) UL (ref 0.1–1)
MONOCYTES NFR BLD AUTO: 9.4 %
NEUTROPHILS # BLD AUTO: 2.12 X10 (3) UL (ref 1.5–7.7)
NEUTROPHILS # BLD AUTO: 2.12 X10(3) UL (ref 1.5–7.7)
NEUTROPHILS NFR BLD AUTO: 58.7 %
NONHDLC SERPL-MCNC: 123 MG/DL (ref ?–130)
OSMOLALITY SERPL CALC.SUM OF ELEC: 288 MOSM/KG (ref 275–295)
PLATELET # BLD AUTO: 221 10(3)UL (ref 150–450)
POTASSIUM SERPL-SCNC: 3.8 MMOL/L (ref 3.5–5.1)
PROT SERPL-MCNC: 7.7 G/DL (ref 5.7–8.2)
RBC # BLD AUTO: 3.79 X10(6)UL (ref 3.8–5.3)
SODIUM SERPL-SCNC: 138 MMOL/L (ref 136–145)
TRIGL SERPL-MCNC: 111 MG/DL (ref 30–149)
TSI SER-ACNC: 1.61 UIU/ML (ref 0.55–4.78)
VIT D+METAB SERPL-MCNC: 27.8 NG/ML (ref 30–100)
VLDLC SERPL CALC-MCNC: 19 MG/DL (ref 0–30)
WBC # BLD AUTO: 3.6 X10(3) UL (ref 4–11)

## 2025-08-21 PROCEDURE — 86480 TB TEST CELL IMMUN MEASURE: CPT

## 2025-08-21 PROCEDURE — 86803 HEPATITIS C AB TEST: CPT | Performed by: INTERNAL MEDICINE

## 2025-08-21 PROCEDURE — 86706 HEP B SURFACE ANTIBODY: CPT | Performed by: INTERNAL MEDICINE

## 2025-08-21 PROCEDURE — 86140 C-REACTIVE PROTEIN: CPT

## 2025-08-21 PROCEDURE — 85025 COMPLETE CBC W/AUTO DIFF WBC: CPT

## 2025-08-21 PROCEDURE — 80053 COMPREHEN METABOLIC PANEL: CPT

## 2025-08-21 PROCEDURE — 86200 CCP ANTIBODY: CPT

## 2025-08-21 PROCEDURE — 85652 RBC SED RATE AUTOMATED: CPT

## 2025-08-21 PROCEDURE — 82306 VITAMIN D 25 HYDROXY: CPT

## 2025-08-21 PROCEDURE — 80061 LIPID PANEL: CPT

## 2025-08-21 PROCEDURE — 87340 HEPATITIS B SURFACE AG IA: CPT | Performed by: INTERNAL MEDICINE

## 2025-08-21 PROCEDURE — 86704 HEP B CORE ANTIBODY TOTAL: CPT | Performed by: INTERNAL MEDICINE

## 2025-08-21 PROCEDURE — 84443 ASSAY THYROID STIM HORMONE: CPT

## 2025-08-21 PROCEDURE — 36415 COLL VENOUS BLD VENIPUNCTURE: CPT

## 2025-08-22 LAB — CCP IGG SERPL-ACNC: 22.5 U/ML (ref 0–6.9)

## 2025-08-25 LAB
M TB IFN-G CD4+ T-CELLS BLD-ACNC: 0.04 IU/ML
M TB TUBERC IFN-G BLD QL: NEGATIVE
M TB TUBERC IGNF/MITOGEN IGNF CONTROL: 2.24 IU/ML
QFT TB1 AG MINUS NIL: 0 IU/ML
QFT TB2 AG MINUS NIL: -0.02 IU/ML

## (undated) DIAGNOSIS — R10.2 PELVIC PAIN: ICD-10-CM

## (undated) DIAGNOSIS — I10 ESSENTIAL HYPERTENSION: Primary | ICD-10-CM

## (undated) DIAGNOSIS — N92.1 MENORRHAGIA WITH IRREGULAR CYCLE: Primary | ICD-10-CM

## (undated) DEVICE — Device

## (undated) DEVICE — DISPOSABLE SUCTION/IRRIGATOR TUBE SET: Brand: AHTO

## (undated) DEVICE — COVER SGL STRL LGHT HNDL BLU

## (undated) DEVICE — TROCAR: Brand: KII SLEEVE

## (undated) DEVICE — 12 ML SYRINGE LUER-LOCK TIP: Brand: MONOJECT

## (undated) DEVICE — UNDYED BRAIDED (POLYGLACTIN 910), SYNTHETIC ABSORBABLE SUTURE: Brand: COATED VICRYL

## (undated) DEVICE — Device: Brand: CUSTOM PROCEDURE KIT

## (undated) DEVICE — SOL  .9 3000ML

## (undated) DEVICE — LAP LIGASURE 5MM BLUNT

## (undated) DEVICE — ADH DRMBND PRINEO SKN CLOS TOP

## (undated) DEVICE — TUBING CYSTO TUR DUAL

## (undated) DEVICE — 60 ML SYRINGE LUER-LOCK TIP: Brand: MONOJECT

## (undated) DEVICE — SYSTEM SURGYPAD VELCRO 36IN

## (undated) DEVICE — LINE MNTR ADLT SET O2 INTMD

## (undated) DEVICE — TROCAR: Brand: KII FIOS FIRST ENTRY

## (undated) DEVICE — SOL H2O 3000ML IRRIG

## (undated) DEVICE — SUTURE VICRYL 0 UR-6

## (undated) DEVICE — LAPAROSCOPY: Brand: MEDLINE INDUSTRIES, INC.

## (undated) DEVICE — TUBING MEGADYNE LAPAROSCOPIC

## (undated) DEVICE — [HIGH FLOW INSUFFLATOR,  DO NOT USE IF PACKAGE IS DAMAGED,  KEEP DRY,  KEEP AWAY FROM SUNLIGHT,  PROTECT FROM HEAT AND RADIOACTIVE SOURCES.]: Brand: PNEUMOSURE

## (undated) DEVICE — MEGADYNE E-Z CLEAN WIRE 33CM

## (undated) DEVICE — STERILE SURGICAL LUBRICANT, METAL TUBE: Brand: SURGILUBE

## (undated) DEVICE — RUMI TIP GREEN 6.7MM X 10CM

## (undated) DEVICE — RUMI KOH-EFFICIENT 3.5CM

## (undated) DEVICE — SOL  .9 1000ML BTL

## (undated) DEVICE — 3M™ STERI-STRIP™ COMPOUND BENZOIN TINCTURE 40 BAGS/CARTON 4 CARTONS/CASE C1544: Brand: 3M™ STERI-STRIP™

## (undated) DEVICE — DEVICE ESURG 10FT 3/32IN FRC

## (undated) DEVICE — SUTURE VLOC 180 0 12" 0316

## (undated) DEVICE — INSUFFLATION NEEDLE TO ESTABLISH PNEUMOPERITONEUM.: Brand: INSUFFLATION NEEDLE

## (undated) DEVICE — MEDI-VAC NON-CONDUCTIVE SUCTION TUBING 6MM X 1.8M (6FT.) L: Brand: CARDINAL HEALTH

## (undated) DEVICE — SUTURE VICRYL 0 CT-1

## (undated) DEVICE — FORCEP RADIAL JAW 4

## (undated) DEVICE — 35 ML SYRINGE REGULAR TIP: Brand: MONOJECT

## (undated) DEVICE — NEOCLOSE GUIDE WITH DARTS

## (undated) DEVICE — DRAPE SHEET LAVH 124X112X30

## (undated) DEVICE — Device: Brand: DEFENDO AIR/WATER/SUCTION AND BIOPSY VALVE

## (undated) DEVICE — DRAPE SHEET LG

## (undated) DEVICE — GAMMEX® PI HYBRID SIZE 6.5, STERILE POWDER-FREE SURGICAL GLOVE, POLYISOPRENE AND NEOPRENE BLEND: Brand: GAMMEX

## (undated) DEVICE — 3M™ STERI-DRAPE™ INSTRUMENT POUCH 1018L: Brand: STERI-DRAPE™

## (undated) NOTE — LETTER
Patient Name: Michelle Escobar  YOB: 1970          MRN :  [id number]  Date:  1/14/2021  Referring Physician:  No ref.  provider found    Mallory Chung has attended 9 visits in Physical Therapy  Reporting period: 11/10/2020 to 12/14/20

## (undated) NOTE — MR AVS SNAPSHOT
[address], [address]  [address] [facility]  [phone]               Thank you for choosing us for your health care visit with Valentino Bali, DO.   We are glad to serve you and happy to provide you with this summary appointment. Failure to obtain required authorization numbers can create reimbursement difficulties for you. Indications/Symptoms:  Apnea    Comorbidities: (Check all that apply):   Other (Specify in Comments) Comment - hypothyroidism    Follow-Up Care: TAKE ONE TABLET BY MOUTH AT BEDTIME FOR SLEEP   Commonly known as:  AMBIEN           * Notice: This list has 2 medication(s) that are the same as other medications prescribed for you.  Read the directions carefully, and ask your doctor or other care provid Water is best for hydration Fast food. Eat at home when possible     Tips for increasing your physical activity – Adults who are physically active are less likely to develop some chronic diseases than adults who are inactive.      HOW TO GET STARTED: HOW

## (undated) NOTE — LETTER
MAJOR CASE PREOPERATIVE INSTRUCTIONS    3/3/2022      Dear Prema Byrd,    Your are having a Total Laparoscopic Hysterectomy-Bilateral salpingectomy,Cystoscopy on Monday,2022 at 9:00am at [address] reminder that pre-op clearance is need prior to surgery. And valid only for 30 days. If clearance is not obtained by the Friday before surgery, surgery will have to be canceled. Do not eat or drink anything (including water) after midnight the night before surgery. Please have a light diet the day before surgery    Please review and follow the attached Preoperative Antimicrobial Wash/Bath Instructions. You are to call this office if you have any cold or flu symptoms 2 days before your scheduled surgery. Please avoid ALL aspirin and herbal supplements 7 days before surgery. Avoid Ibuprofen, Motrin, Aleve, or Naprosyn for 3 days before surgery. Please avoid ALL Cannabis use 24 hours prior to surgery. You cannot wear hair pins,wigs,artificial nail or metallic nails/ nail polish for surgery. You will be contacted by PreAdmission Testing (PAT) usually within the week before surgery. They will take a short medical history and let you know if any preoperative testing is needed. If you have any questions for preadmission testing please contact them directly by calling [phone]. In accordance with IDPH guidelines we must ask that you self-quarantine as best as possible until the day of your surgical/non-surgical procedure once you have been tested for   COVID( testing is performed with 72 hours of the scheduled procedure). It is important to take precautions against the spread of COVID-19 disease both before   and after your surgical procedure. We ask that you adhere to the followin. Avoid crowds  2. Wear a mask or face covering in public  3. Maintain social distancing  4.  Practice good hygiene    I contacted your insurance and was advised that no prior authorization is needed for surgery. Call our office now to schedule your post-operative appointment for 4 weeks after surgery. Please feel free to contact our office at [phone] if you have any questions regarding these instructions or your procedure.       Sincerely,          Presley Schaffer MD  [address] [place] [address]  [phone]    Document electronically generated by:  Micki Arthur

## (undated) NOTE — LETTER
AUTHORIZATION FOR SURGICAL OPERATION OR OTHER PROCEDURE    1.  I hereby authorize ISHA Mai, and [address] [place] [address] staff assigned to my case to perform the following operation and/or procedure at the [address] [place] [address]:    Sol LANGFORD Time:  ________ A. M.  P.M.        Patient Name:  ______________________________________________________  (please print)      Patient signature:  ___________________________________________________             Relationship to Patient:

## (undated) NOTE — LETTER
11/4/2021              Vinay Marmolejo        [address] [address]        [place] [id number]         To whom it may concern,    Vinay Marmolejo is currently a patient under my medical care.   Due to her medical condition,  please allow her to move

## (undated) NOTE — LETTER
AUTHORIZATION FOR SURGICAL OPERATION OR OTHER PROCEDURE    1. I hereby authorize Dr. Yvonne Lawton, and [facility] staff assigned to my case to perform the following operation and/or procedure at the [employer] [facility]:    ENDOMETRIAL BIOPSY      2. Responsible person                          []  Spouse  In case of minor or                    [] Other  _____________   Incompetent name:  __________________________________________________                               (please print)      _____________

## (undated) NOTE — MR AVS SNAPSHOT
[place] [address] [facility] [phone]               Thank you for choosing us for your health care visit with [facility].   We are glad to serve you and happy to provide you with [facility] Summaries. If you've been to the Emergency Department or your doctor's office, you can view your past visit information in [employer] by going to Visits < Visit Summaries. [employer] questions? Call [phone] for help.   [employer] is NOT to be used for urge

## (undated) NOTE — LETTER
AUTHORIZATION FOR SURGICAL OPERATION OR OTHER PROCEDURE    1. I hereby authorize Dr. Karen Yun, and [facility] staff assigned to my case to perform the following operation and/or procedure at the [facility]:    _______________________________________________________________________________________________    Genital  Wart removal  _______________________________________________________________________________________________    2.  My physician has explained the nature and purpose of the operation or other procedure, possible alternative methods of treatment, the risks involved, and the possibility of complication to me.  I acknowledge that no guarantee has been made as to the result that may be obtained.  3.  I recognize that, during the course of this operation, or other procedure, unforseen conditions may necessitate additional or different procedure than those listed above.  I, therefore, further authorize and request that the above named physician, his/her physician assistants or designees perform such procedures as are, in his/her professional opinion, necessary and desirable.  4.  Any tissue or organs removed in the operation or other procedure may be disposed of by and at the discretion of the [facility] and [facility].  5.  I understand that in the event of a medical emergency, I will be transported by local paramedics to [facility] or other hospital emergency department.  6.  I certify that I have read and fully understand the above consent to operation and/or other procedure.    7.  I acknowledge that my physician has explained sedation/analgesia administration to me including the risks and benefits.  I consent to the administration of sedation/analgesia as may be necessary or desirable in the judgement of my physician.    Witness signature: ___________________________________________________ Date:  ______/______/_____                    Time:   ________ A.M.  P.M.       Patient Name:  ______________________________________________________  (please print)      Patient signature:  ___________________________________________________             Relationship to Patient:           []  Parent    Responsible person                          []  Spouse  In case of minor or                    [] Other  _____________   Incompetent name:  __________________________________________________                               (please print)      _____________      Responsible person  In case of minor or  Incompetent signature:  _______________________________________________    Statement of Physician  My signature below affirms that prior to the time of the procedure, I have explained to the patient and/or his/her guardian, the risks and benefits involved in the proposed treatment and any reasonable alternative to the proposed treatment.  I have also explained the risks and benefits involved in the refusal of the proposed treatment and have answered the patient's questions.                        Date:  ______/______/_______  Provider                      Signature:  __________________________________________________________       Time:  ___________ A.M    P.M.

## (undated) NOTE — LETTER
12/06/21        [facility]  [address] [address]  [address] [facility] [address]      Dear Laura Ayala,    [address] [facility] records indicate that you have outstanding lab work and or testing that was ordered for you and has not yet been completed:  Orders Placed This Encoun

## (undated) NOTE — LETTER
05/27/21        [facility]  [address] [address]  [place] [address]      Dear Isa Skaggs records indicate that you have outstanding lab work and or testing that was ordered for you and has not yet been completed:  Orders Placed This [place]

## (undated) NOTE — LETTER
AUTHORIZATION FOR SURGICAL OPERATION OR OTHER PROCEDURE    1. I hereby authorize Dr. Jeremy Abdalla, and [employer] staff assigned to my case to perform the following operation and/or procedure at the [facility]:      ENDOMETRIAL BIOPSY       2.  My physician has explained the nature and purpose of the operation or other procedure, possible alternative methods of treatment, the risks involved, and the possibility of complication to me. I acknowledge that no guarantee has been made as to the result that may be obtained. 3.  I recognize that, during the course of this operation, or other procedure, unforseen conditions may necessitate additional or different procedure than those listed above. I, therefore, further authorize and request that the above named physician, his/her physician assistants or designees perform such procedures as are, in his/her professional opinion, necessary and desirable. 4.  Any tissue or organs removed in the operation or other procedure may be disposed of by and at the discretion of the [facility] [employer] [email or web address] and [facility]. 5.  I understand that in the event of a medical emergency, I will be transported by local paramedics to [facility] or other hospital emergency department. 6.  I certify that I have read and fully understand the above consent to operation and/or other procedure. 7.  I acknowledge that my physician has explained sedation/analgesia administration to me including the risks and benefits. I consent to the administration of sedation/analgesia as may be necessary or desirable in the judgement of my physician. Witness signature: ___________________________________________________ Date:  ______/______/_____                    Time:  ________ A. M.  P.M.        Patient Name:  ______________________________________________________  (please print)      Patient signature: ___________________________________________________             Relationship to Patient:           []  Parent    Responsible person                          []  Spouse  In case of minor or                    [] Other  _____________   Incompetent name:  __________________________________________________                               (please print)      _____________      Responsible person  In case of minor or  Incompetent signature:  _______________________________________________    Statement of Physician  My signature below affirms that prior to the time of the procedure, I have explained to the patient and/or his/her guardian, the risks and benefits involved in the proposed treatment and any reasonable alternative to the proposed treatment. I have also explained the risks and benefits involved in the refusal of the proposed treatment and have answered the patient's questions.                         Date:  ______/______/_______  Provider                      Signature:  __________________________________________________________       Time:  ___________ A.M    P.M.

## (undated) NOTE — LETTER
11/05/20        [address] [facility] [address]      Dear Brittnee Sunshine,    [address] [facility] records indicate that you have outstanding lab work and or testing that was ordered for you and has not yet been completed:   [address], [employer]

## (undated) NOTE — LETTER
AUTHORIZATION FOR SURGICAL OPERATION OR OTHER PROCEDURE    1. I hereby authorize Dr. Joseph Verdin, and [facility] staff assigned to my case to perform the following operation and/or procedure at the [facility] site:    Right knee steroid injection _______________________________________________________________________________________________      _______________________________________________________________________________________________    2.  My physician has explained the nature and purpose of the operation or other procedure, possible alternative methods of treatment, the risks involved, and the possibility of complication to me.  I acknowledge that no guarantee has been made as to the result that may be obtained.  3.  I recognize that, during the course of this operation, or other procedure, unforseen conditions may necessitate additional or different procedure than those listed above.  I, therefore, further authorize and request that the above named physician, his/her physician assistants or designees perform such procedures as are, in his/her professional opinion, necessary and desirable.  4.  Any tissue or organs removed in the operation or other procedure may be disposed of by and at the discretion of the [facility] and [facility].  5.  I understand that in the event of a medical emergency, I will be transported by local paramedics to [facility] or other hospital emergency department.  6.  I certify that I have read and fully understand the above consent to operation and/or other procedure.    7.  I acknowledge that my physician has explained sedation/analgesia administration to me including the risks and benefits.  I consent to the administration of sedation/analgesia as may be necessary or desirable in the judgement of my physician.    Witness signature: ___________________________________________________ Date:   ______/______/_____                    Time:  ________ A.M.  P.M.       Patient Name:  ______________________________________________________  (please print)      Patient signature:  ___________________________________________________             Relationship to Patient:           []  Parent    Responsible person                          []  Spouse  In case of minor or                    [] Other  _____________   Incompetent name:  __________________________________________________                               (please print)      _____________      Responsible person  In case of minor or  Incompetent signature:  _______________________________________________    Statement of Physician  My signature below affirms that prior to the time of the procedure, I have explained to the patient and/or his/her guardian, the risks and benefits involved in the proposed treatment and any reasonable alternative to the proposed treatment.  I have also explained the risks and benefits involved in the refusal of the proposed treatment and have answered the patient's questions.                        Date:  ______/______/_______  Provider   12/16/2024     3:38 pm                    Signature:  __________________________________________________________       Time:  ___________ A.M    P.M.

## (undated) NOTE — MR AVS SNAPSHOT
[facility]  [address] [place] [address] [id number] [phone]               Thank you for choosing us for your health care visit with Ritesh Welch MD.  We are glad to serve you and happy to provide you with this summary of your visit. view more details from this visit by going to [email or web address]. If you've recently had a stay at the Hospital you can access your discharge instructions in [employer] by going to Visits < Admission Summaries.  If you've been to the Emergency Depar

## (undated) NOTE — LETTER
AUTHORIZATION FOR SURGICAL OPERATION OR OTHER PROCEDURE    1.  I hereby authorize Dr. Jayant Lott, and [facility] staff assigned to my case to perform the following operation and/or procedure at the [facility]:  SKIN TAG REMOVAL  ___________________ Time:  ________ A. M.  P.M.        Patient Name:  ______________________________________________________  (please print)      Patient signature:  ___________________________________________________             Relationship to Patient: